# Patient Record
Sex: FEMALE | Race: WHITE | Employment: FULL TIME | URBAN - METROPOLITAN AREA
[De-identification: names, ages, dates, MRNs, and addresses within clinical notes are randomized per-mention and may not be internally consistent; named-entity substitution may affect disease eponyms.]

---

## 2021-04-09 NOTE — PROGRESS NOTES
SUBJECTIVE:   CC: Melba Galvan is an 30 year old woman who presents for preventive health visit.       Patient has been advised of split billing requirements and indicates understanding: Yes  Healthy Habits:     Getting at least 3 servings of Calcium per day:  Yes    Bi-annual eye exam:  Yes    Dental care twice a year:  NO    Sleep apnea or symptoms of sleep apnea:  None    Diet:  Low fat/cholesterol and Carbohydrate counting    Frequency of exercise:  2-3 days/week    Duration of exercise:  45-60 minutes    Taking medications regularly:  Yes    Medication side effects:  None    PHQ-2 Total Score: 0    Additional concerns today:  Yes  new patient to me & [facility]  She reports first physical since relocated to [facility] 9 month ago.  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        Pt states she has history of UTI's would like to discuss with provider treatments.  Previously treated in [facility] and [place]     Usually ovulation and preperiods burning in urine  But lately going often for urination but no burning, blood in urine     Also reports that 8 months ago-extensive  blood test, all normal - normal tsh, fasting lipid, fasting glucose - all normal. No avaliable records- [place].  Does not remember last pap smear and reports they been normal       Today's PHQ-2 Score:   PHQ-2 ( 1999 Pfizer) 4/9/2021   Q1: Little interest or pleasure in doing things 0   Q2: Feeling down, depressed or hopeless 0   PHQ-2 Score 0   Q1: Little interest or pleasure in doing things Not at all   Q2: Feeling down, depressed or hopeless Not at all   PHQ-2 Score 0       Abuse: Current or Past (Physical, Sexual or Emotional) - No  Do you feel safe in your environment?  Yes        Social History     Tobacco Use     Smoking status: Former Smoker     Packs/day: 0.50     Years: 10.00     Pack years: 5.00     Types: Hookah     Start date: 2010     Quit date: 2020     Years since quittin.6     Smokeless tobacco: Never Used   Substance Use Topics     Alcohol use: Never     If you drink alcohol do you typically have >3 drinks per day or >7 drinks per week? No    No flowsheet data found.    Reviewed orders with patient.  Reviewed health maintenance and updated orders accordingly - Yes  BP Readings from Last 3 Encounters:   21 113/73    Wt Readings from Last 3 Encounters:   21 64.4 kg (142 lb)                    Breast Cancer Screening:    Breast CA Risk Assessment (FHS-7) 2021   Do you have a family history of breast, colon, or ovarian cancer? No / Unknown       click delete button to remove this line now    Pertinent mammograms are reviewed under the imaging tab.    History of abnormal Pap smear: NO - age 30- 65 PAP every 3 years recommended     Reviewed and updated as needed this visit by clinical staff  Tobacco  Allergies  Meds  Problems  Med Hx  Surg Hx  Fam Hx  Soc Hx          Reviewed and updated as needed this visit by Provider  Tobacco  Allergies  Meds  Problems  Med Hx  Surg Hx  Fam Hx             Review of Systems  CONSTITUTIONAL: NEGATIVE for fever, chills, change in weight  INTEGUMENTARU/SKIN: NEGATIVE for worrisome rashes, moles or lesions  EYES: NEGATIVE for vision changes or irritation  ENT: NEGATIVE for ear, mouth and throat problems  RESP: NEGATIVE for significant cough or SOB  BREAST: NEGATIVE for masses, tenderness or discharge  CV: NEGATIVE for chest pain, palpitations or peripheral edema  GI: NEGATIVE for nausea, abdominal pain, heartburn, or change in bowel habits  : NEGATIVE for unusual urinary or vaginal symptoms. Periods are regular.  MUSCULOSKELETAL: NEGATIVE for significant arthralgias or myalgia  NEURO: NEGATIVE for  "weakness, dizziness or paresthesias  PSYCHIATRIC: NEGATIVE for changes in mood or affect     OBJECTIVE:   /73   Pulse 88   Temp 97.3  F (36.3  C) (Tympanic)   Resp 16   Ht 1.702 m (5' 7\")   Wt 64.4 kg (142 lb)   LMP 03/18/2021   SpO2 98%   BMI 22.24 kg/m    Physical Exam  GENERAL: healthy, alert and no distress  EYES: Eyes grossly normal to inspection, PERRL and conjunctivae and sclerae normal  HENT: ear canals and TM's normal, nose and mouth without ulcers or lesions  NECK: no adenopathy, no asymmetry, masses, or scars and thyroid normal to palpation  RESP: lungs clear to auscultation - no rales, rhonchi or wheezes  CV: regular rate and rhythm, normal S1 S2, no S3 or S4, no murmur, click or rub, no peripheral edema and peripheral pulses strong  ABDOMEN: soft, nontender, no hepatosplenomegaly, no masses and bowel sounds normal   (male): normal male genitalia without lesions or urethral discharge, no hernia  MS: no gross musculoskeletal defects noted, no edema  SKIN: no suspicious lesions or rashes  NEURO: Normal strength and tone, mentation intact and speech normal  PSYCH: mentation appears normal, affect normal/bright    Diagnostic Test Results:  Labs reviewed in Epic  No results found for this or any previous visit (from the past 24 hour(s)).    ASSESSMENT/PLAN:   Melba 30 year old new patient to [facility] and to me  was seen today for physical.    Diagnoses and all orders for this visit:    Routine general medical examination at a health care facility  Pap is sent- if NIL- ok to repeat in 3 yrs    Screening for cervical cancer  -     Pap imaged thin layer screen with HPV - recommended age 30 - 65 years (select HPV order below)  -     HPV High Risk Types DNA Cervical        Personal history of urinary tract infection  -     *UA reflex to Microscopic  Encouraged hydration  UA is pending & will inform as avaliable     History of vaginal infection  -     Wet prep  We discussed the positive results  Ok to " "wait and take diflucan- as she gets symptoms before periods and she is expecting them in next 3 day.  No need for long term treatment   Candidiasis of vulva and vagina  -    Single dose treatment sent    fluconazole (DIFLUCAN) 150 MG tablet; Take 1 tablet (150 mg) by mouth daily         Patient has been advised of split billing requirements and indicates understanding: Yes  COUNSELING:  Reviewed preventive health counseling, as reflected in patient instructions       Regular exercise       Healthy diet/nutrition       Vision screening       Hearing screening       Advance Care Planning    Estimated body mass index is 22.24 kg/m  as calculated from the following:    Height as of this encounter: 1.702 m (5' 7\").    Weight as of this encounter: 64.4 kg (142 lb).        She reports that she quit smoking about 7 months ago. Her smoking use included hookah. She started smoking about 10 years ago. She has a 5.00 pack-year smoking history. She has never used smokeless tobacco.      Counseling Resources:  ATP IV Guidelines  Pooled Cohorts Equation Calculator  Breast Cancer Risk Calculator  BRCA-Related Cancer Risk Assessment: FHS-7 Tool  FRAX Risk Assessment  ICSI Preventive Guidelines  Dietary Guidelines for Americans, 2010  [employer]'s MyPlate  ASA Prophylaxis  Lung CA Screening    Sonali Amaya MD  [facility] [place] [facility]  "

## 2021-04-12 ENCOUNTER — OFFICE VISIT (OUTPATIENT)
Dept: FAMILY MEDICINE | Facility: CLINIC | Age: 31
End: 2021-04-12
Payer: COMMERCIAL

## 2021-04-12 ENCOUNTER — MYC MEDICAL ADVICE (OUTPATIENT)
Dept: FAMILY MEDICINE | Facility: CLINIC | Age: 31
End: 2021-04-12

## 2021-04-12 VITALS
DIASTOLIC BLOOD PRESSURE: 73 MMHG | SYSTOLIC BLOOD PRESSURE: 113 MMHG | HEART RATE: 88 BPM | WEIGHT: 142 LBS | TEMPERATURE: 97.3 F | RESPIRATION RATE: 16 BRPM | OXYGEN SATURATION: 98 % | HEIGHT: 67 IN | BODY MASS INDEX: 22.29 KG/M2

## 2021-04-12 DIAGNOSIS — Z87.42 HISTORY OF VAGINAL INFECTION: ICD-10-CM

## 2021-04-12 DIAGNOSIS — Z87.440 PERSONAL HISTORY OF URINARY TRACT INFECTION: ICD-10-CM

## 2021-04-12 DIAGNOSIS — Z00.00 ROUTINE GENERAL MEDICAL EXAMINATION AT A HEALTH CARE FACILITY: Primary | ICD-10-CM

## 2021-04-12 DIAGNOSIS — Z12.4 SCREENING FOR CERVICAL CANCER: ICD-10-CM

## 2021-04-12 DIAGNOSIS — B37.31 CANDIDIASIS OF VULVA AND VAGINA: ICD-10-CM

## 2021-04-12 LAB
ALBUMIN UR-MCNC: NEGATIVE MG/DL
APPEARANCE UR: CLEAR
BILIRUB UR QL STRIP: NEGATIVE
COLOR UR AUTO: YELLOW
GLUCOSE UR STRIP-MCNC: NEGATIVE MG/DL
HGB UR QL STRIP: NEGATIVE
KETONES UR STRIP-MCNC: ABNORMAL MG/DL
LEUKOCYTE ESTERASE UR QL STRIP: NEGATIVE
NITRATE UR QL: NEGATIVE
PH UR STRIP: 6 PH (ref 5–7)
SOURCE: ABNORMAL
SP GR UR STRIP: 1.02 (ref 1–1.03)
UROBILINOGEN UR STRIP-ACNC: 0.2 EU/DL (ref 0.2–1)

## 2021-04-12 PROCEDURE — 81003 URINALYSIS AUTO W/O SCOPE: CPT | Performed by: FAMILY MEDICINE

## 2021-04-12 PROCEDURE — 87624 HPV HI-RISK TYP POOLED RSLT: CPT | Performed by: FAMILY MEDICINE

## 2021-04-12 PROCEDURE — 99385 PREV VISIT NEW AGE 18-39: CPT | Performed by: FAMILY MEDICINE

## 2021-04-12 PROCEDURE — G0145 SCR C/V CYTO,THINLAYER,RESCR: HCPCS | Performed by: FAMILY MEDICINE

## 2021-04-12 PROCEDURE — 87210 SMEAR WET MOUNT SALINE/INK: CPT | Performed by: FAMILY MEDICINE

## 2021-04-12 RX ORDER — FLUCONAZOLE 150 MG/1
150 TABLET ORAL DAILY
Qty: 1 TABLET | Refills: 0 | Status: SHIPPED | OUTPATIENT
Start: 2021-04-12 | End: 2021-04-28

## 2021-04-12 ASSESSMENT — MIFFLIN-ST. JEOR: SCORE: 1386.74

## 2021-04-13 ENCOUNTER — MYC MEDICAL ADVICE (OUTPATIENT)
Dept: LAB | Facility: CLINIC | Age: 31
End: 2021-04-13

## 2021-04-13 LAB
Lab: ABNORMAL
SPECIMEN SOURCE: ABNORMAL
WET PREP SPEC: ABNORMAL

## 2021-04-13 NOTE — RESULT ENCOUNTER NOTE
Dear lab- wet prep was reported verbally positive for BV and the results still in process.  Can you please review and release wet prep- thanks

## 2021-04-13 NOTE — TELEPHONE ENCOUNTER
A.S  Please see mychart message  You still recommend the fluconazole because there was a verbal confirmation of BV or are you waiting for lab results to finalize?    Thank you,  Geena Foote RN

## 2021-04-15 LAB
COPATH REPORT: NORMAL
PAP: NORMAL

## 2021-04-16 LAB
FINAL DIAGNOSIS: NORMAL
HPV HR 12 DNA CVX QL NAA+PROBE: NEGATIVE
HPV16 DNA SPEC QL NAA+PROBE: NEGATIVE
HPV18 DNA SPEC QL NAA+PROBE: NEGATIVE
SPECIMEN DESCRIPTION: NORMAL
SPECIMEN SOURCE CVX/VAG CYTO: NORMAL

## 2021-04-18 ENCOUNTER — MYC MEDICAL ADVICE (OUTPATIENT)
Dept: FAMILY MEDICINE | Facility: CLINIC | Age: 31
End: 2021-04-18

## 2021-04-19 ENCOUNTER — OFFICE VISIT (OUTPATIENT)
Dept: FAMILY MEDICINE | Facility: CLINIC | Age: 31
End: 2021-04-19
Payer: COMMERCIAL

## 2021-04-19 VITALS
WEIGHT: 140 LBS | BODY MASS INDEX: 21.97 KG/M2 | SYSTOLIC BLOOD PRESSURE: 102 MMHG | DIASTOLIC BLOOD PRESSURE: 70 MMHG | TEMPERATURE: 97.7 F | RESPIRATION RATE: 16 BRPM | HEIGHT: 67 IN | OXYGEN SATURATION: 99 % | HEART RATE: 86 BPM

## 2021-04-19 DIAGNOSIS — Z32.00 ENCOUNTER FOR CONFIRMATION OF PREGNANCY TEST RESULT WITH PHYSICAL EXAMINATION: Primary | ICD-10-CM

## 2021-04-19 LAB — HCG UR QL: POSITIVE

## 2021-04-19 PROCEDURE — 81025 URINE PREGNANCY TEST: CPT | Performed by: FAMILY MEDICINE

## 2021-04-19 PROCEDURE — 99214 OFFICE O/P EST MOD 30 MIN: CPT | Performed by: FAMILY MEDICINE

## 2021-04-19 ASSESSMENT — MIFFLIN-ST. JEOR: SCORE: 1377.67

## 2021-04-19 NOTE — PATIENT INSTRUCTIONS
Last menstural period   Expected date of delivery 21  4wk 4 d pregnant    Start over the counter prenatal vitamins , with folic acid- at least 400 mcg once daily  Over the counter omega 3 once daily       For morning sickness over the counter B 6 or over the counter unisom  Ok to take metoclopramide if you really need to    Call to schedule your imagin/17 or later to establish cardiac activity in fetus   [place] or [facility] [phone]

## 2021-04-19 NOTE — PROGRESS NOTES
Assessment & Plan     Encounter for confirmation of pregnancy test result with physical examination   at 4W & 4d pregnant  Urine pregnancy test is positive in clinic as well.Start prenatal vitamins   Omega 3 over the counter once daily  [facility] for first prenatal visit in next 4-6 weeks.  Based on last menstural period- of 3/18, Expected date of delivery is .    - HCG Qual, Urine - [facility],  Range, Prim  ([id number])  Prenatal care:   Instructed on use of triage nurse line and contacting the on-call provider after hours for an urgent need such as fever, vagina bleeding, bladder or vaginal infection, rupture of membranes,  or term labor.   Instructed on best evidence for: weight gain for her weight and height for pregnancy; healthy diet and foods to avoid; exercise and activity during pregnancy and maintenance of a generally healthy lifestyle.   Discussed the harms, benefits, side effects and alternative therapies for current prescribed and OTC medications.  Avoid caffeine intake, handling of toxic substances,  Encouraged  daily prenatal vitamins , child birth classes, choosing doctor for new born, and regular prenatal exams.    Discussed in detail collaborative care with midwives  Need to pick hospital and  provider.  30minutes spent on the date of the encounter doing chart review, history and exam, documentation and further activities per the note      Return in about 4 weeks (around 2021) for Prenatal care.    Sonali Amaya MD  [facility] is a 30 year old who presents for the following health issues   HPI     Patient is here for pregnancy confirmation has taken 1 test at home which was positive .   Last menstrual cycle was on 2021 .   Has metoclopramide and in past taken for nausea for food eversion/ or nausea induced by strong smells    She & her  are very excited.  She did not expect it to be so quick and is happy  "with the outcme      Review of Systems   Constitutional, HEENT, cardiovascular, pulmonary, GI, , musculoskeletal, neuro, skin, endocrine and psych systems are negative, except as otherwise noted.      Objective    /70   Pulse 86   Temp 97.7  F (36.5  C) (Skin)   Resp 16   Ht 1.702 m (5' 7\")   Wt 63.5 kg (140 lb)   LMP 03/18/2021   SpO2 99%   BMI 21.93 kg/m    Body mass index is 21.93 kg/m .  Physical Exam   GENERAL: healthy, alert and no distress  NECK: no adenopathy, no asymmetry, masses, or scars and thyroid normal to palpation  RESP: lungs clear to auscultation - no rales, rhonchi or wheezes  CV: regular rate and rhythm, normal S1 S2, no S3 or S4, no murmur, click or rub, no peripheral edema and peripheral pulses strong  ABDOMEN: soft, nontender, no hepatosplenomegaly, no masses and bowel sounds normal    Results for orders placed or performed in visit on 04/19/21 (from the past 24 hour(s))   HCG Qual, Urine - [facility]  ([id number])   Result Value Ref Range    HCG Qual Urine Positive (A) NEG^Negative               "

## 2021-04-28 ENCOUNTER — PRENATAL OFFICE VISIT (OUTPATIENT)
Dept: FAMILY MEDICINE | Facility: CLINIC | Age: 31
End: 2021-04-28
Payer: COMMERCIAL

## 2021-04-28 ENCOUNTER — NURSE TRIAGE (OUTPATIENT)
Dept: NURSING | Facility: CLINIC | Age: 31
End: 2021-04-28

## 2021-04-28 VITALS
HEART RATE: 88 BPM | RESPIRATION RATE: 16 BRPM | BODY MASS INDEX: 22.37 KG/M2 | TEMPERATURE: 98.6 F | WEIGHT: 142.56 LBS | HEIGHT: 67 IN | SYSTOLIC BLOOD PRESSURE: 106 MMHG | DIASTOLIC BLOOD PRESSURE: 70 MMHG | OXYGEN SATURATION: 96 %

## 2021-04-28 DIAGNOSIS — O26.851 SPOTTING IN FIRST TRIMESTER: Primary | ICD-10-CM

## 2021-04-28 LAB
ABO + RH BLD: NORMAL
ABO + RH BLD: NORMAL
B-HCG SERPL-ACNC: ABNORMAL IU/L (ref 0–5)
ERYTHROCYTE [DISTWIDTH] IN BLOOD BY AUTOMATED COUNT: 13 % (ref 10–15)
HCT VFR BLD AUTO: 38 % (ref 35–47)
HGB BLD-MCNC: 12.8 G/DL (ref 11.7–15.7)
MCH RBC QN AUTO: 29 PG (ref 26.5–33)
MCHC RBC AUTO-ENTMCNC: 33.7 G/DL (ref 31.5–36.5)
MCV RBC AUTO: 86 FL (ref 78–100)
PLATELET # BLD AUTO: 189 10E9/L (ref 150–450)
RBC # BLD AUTO: 4.42 10E12/L (ref 3.8–5.2)
SPECIMEN EXP DATE BLD: NORMAL
WBC # BLD AUTO: 6.6 10E9/L (ref 4–11)

## 2021-04-28 PROCEDURE — 85027 COMPLETE CBC AUTOMATED: CPT | Performed by: FAMILY MEDICINE

## 2021-04-28 PROCEDURE — 86900 BLOOD TYPING SEROLOGIC ABO: CPT | Performed by: FAMILY MEDICINE

## 2021-04-28 PROCEDURE — 99214 OFFICE O/P EST MOD 30 MIN: CPT | Performed by: FAMILY MEDICINE

## 2021-04-28 PROCEDURE — 84702 CHORIONIC GONADOTROPIN TEST: CPT | Performed by: FAMILY MEDICINE

## 2021-04-28 PROCEDURE — 36415 COLL VENOUS BLD VENIPUNCTURE: CPT | Performed by: FAMILY MEDICINE

## 2021-04-28 PROCEDURE — 86901 BLOOD TYPING SEROLOGIC RH(D): CPT | Performed by: FAMILY MEDICINE

## 2021-04-28 ASSESSMENT — MIFFLIN-ST. JEOR: SCORE: 1389.29

## 2021-04-28 NOTE — PROGRESS NOTES
"    Assessment & Plan     Spotting in first trimester  Melbaranda Galvan is 31 yo Primigravida   Based on lmp she is 5w6 today  and has been cramping with some bleed today.   Complete pelvic rest  ULTRASOUND in 1-2 days   - US OB < 14 Weeks Single; Future  - HCG Quantitative Pregnancy, Blood ([id number])  - ABO and Rh  - CBC with platelets    Referral given for OBG/GYN    FMG: [facility] [phone]   [email or web address]  UMP: [facility] [phone]      Return in about 1 week (around 5/5/2021) for concerns,unresolved.    Sonali Amaya MD  [facility]    Stephenie Reilly is a 30 year old who presents for the following health issues     HPI     Patient states she has been having vaginal bleeding \"tiny clots\"  2 hrs ago today when she woke up  She  noted some cramping  Last night. & that is getting better         Review of Systems   Constitutional, HEENT, cardiovascular, pulmonary, GI, , musculoskeletal, neuro, skin, endocrine and psych systems are negative, except as otherwise noted.      Objective    /70   Pulse 88   Temp 98.6  F (37  C) (Tympanic)   Resp 16   Ht 1.702 m (5' 7\")   Wt 64.7 kg (142 lb 9 oz)   LMP 03/18/2021   SpO2 96%   BMI 22.33 kg/m    Body mass index is 22.33 kg/m .  Physical Exam   GENERAL: healthy, alert and no distress  NECK: no adenopathy, no asymmetry, masses, or scars and thyroid normal to palpation  RESP: lungs clear to auscultation - no rales, rhonchi or wheezes  CV: regular rate and rhythm, normal S1 S2, no S3 or S4, no murmur, click or rub, no peripheral edema and peripheral pulses strong  ABDOMEN: soft, nontender, no hepatosplenomegaly, no masses and bowel sounds normal   (female): normal female external genitalia, normal urethral meatus, vaginal mucosa, normal cervix/adnexa/uterus without masses or discharge  MS: no gross musculoskeletal defects noted, no " edema    No results found for this or any previous visit (from the past 24 hour(s)).

## 2021-04-28 NOTE — TELEPHONE ENCOUNTER
Melba is having spotting vaginally this morning.  Tiny clots present.  Melba had a confirmation of pregnancy through urine with MD Amaya on April 19th.   Melba uses a small pad and is the size of 10 cent coin.  Patient is requesting to be transferred back to [facility] to see if she can be seen in clinic today.    COVID 19 Nurse Triage Plan/Patient Instructions    Please be aware that novel coronavirus (COVID-19) may be circulating in the community. If you develop symptoms such as fever, cough, or SOB or if you have concerns about the presence of another infection including coronavirus (COVID-19), please contact your health care provider or visit [email or web address].     Disposition/Instructions    Home care recommended. Follow home care protocol based instructions.    Thank you for taking steps to prevent the spread of this virus.  o Limit your contact with others.  o Wear a simple mask to cover your cough.  o Wash your hands well and often.    Resources    [facility]: About COVID-19: [email or web address]    CDC: What to Do If You're Sick: www.cdc.gov/coronavirus/2019-ncov/about/steps-when-sick.html    CDC: Ending Home Isolation: www.cdc.gov/coronavirus/2019-ncov/hcp/disposition-in-home-patients.html     CDC: Caring for Someone: www.cdc.gov/coronavirus/2019-ncov/if-you-are-sick/care-for-someone.html     [facility]: Interim Guidance for Hospital Discharge to Home: www.health.[facility]/diseases/coronavirus/hcp/hospdischarge.pdf    [facility] clinical trials (COVID-19 research studies): [email or web address].[facility]/umn-clinical-trials     Below are the COVID-19 hotlines at the [place] Department of Health ([facility]). Interpreters are available.   o For health questions: Call 210-105-3050 or 1-352.118.1271 (7 a.m. to 7 p.m.)  o For questions about schools and childcare: Call 840-582-0165 or 1-848.443.8206 (7 a.m. to 7 p.m.)                       Additional Information    Negative: Shock  suspected (e.g., cold/pale/clammy skin, too weak to stand, low BP, rapid pulse)    Negative: Difficult to awaken or acting confused (e.g., disoriented, slurred speech)    Negative: Passed out (i.e., fainted, collapsed and was not responding)    Negative: Sounds like a life-threatening emergency to the triager    Negative: SEVERE abdominal pain (e.g., excruciating)    Negative: SEVERE vaginal bleeding (i.e., soaking 2 pads / hour, large blood clots) and present for 2 or more hours    Negative: SEVERE dizziness (e.g., unable to stand, requires support to walk, feels like passing out)    Negative: MODERATE vaginal bleeding (i.e., soaking 1 pad) and present > 6 hours    Negative: MODERATE vaginal bleeding (i.e., soaking 1 pad / hour, clots) and pregnant > 12 weeks    Negative: Passed tissue (e.g., gray-white)    Negative: Shoulder pain    Negative: Constant abdominal pain lasting > 1 hour    Negative: Fever > 100.4 F (38.0 C)    Negative: Pale skin (pallor) of new onset or worsening    Negative: Patient sounds very sick or weak to the triager    Negative: MODERATE vaginal bleeding (i.e., soaking 1 pad / hour; clots)    Negative: Intermittent lower abdominal pain (e.g., cramping) lasting > 24 hours    Negative: Pain or burning with passing urine (urination)    Negative: Prior history of 'ectopic pregnancy' or previous tubal surgery (e.g., tubal ligation)    Negative: Patient wants to be seen    Negative: MILD vaginal bleeding (i.e., less than 1 pad / hour; less than patient's usual menstrual bleeding; not just spotting)    Negative: SPOTTING lasting > 48 hours or spotting happens more than once in a week    Negative: Has IUD    Negative: Unusual vaginal discharge (e.g., bad smelling, yellow, green, or foamy-white)    Negative: Not feeling pregnant any longer (e.g., breast tenderness or nausea has disappeared)    SPOTTING (single or brief episode)    Protocols used: PREGNANCY - VAGINAL BLEEDING LESS THAN 20 WEEKS  ODALIS-DORINDA-OH

## 2021-04-28 NOTE — PATIENT INSTRUCTIONS
Complete pelvic rest  Call to schedule your imaging: for ultrasound   [place] or [facility] [phone]    Very common to have first trimester spotting  Will keep you posted abut ultrasound     [facility]G: [facility] [phone]   [email or web address]  UMP: [facility] [phone]

## 2021-04-29 ENCOUNTER — TELEPHONE (OUTPATIENT)
Dept: FAMILY MEDICINE | Facility: CLINIC | Age: 31
End: 2021-04-29

## 2021-04-29 ENCOUNTER — MYC MEDICAL ADVICE (OUTPATIENT)
Dept: FAMILY MEDICINE | Facility: CLINIC | Age: 31
End: 2021-04-29

## 2021-04-29 ENCOUNTER — HOSPITAL ENCOUNTER (OUTPATIENT)
Dept: ULTRASOUND IMAGING | Facility: CLINIC | Age: 31
Discharge: HOME | End: 2021-04-29
Attending: FAMILY MEDICINE | Admitting: FAMILY MEDICINE
Payer: COMMERCIAL

## 2021-04-29 DIAGNOSIS — O26.851 SPOTTING IN FIRST TRIMESTER: ICD-10-CM

## 2021-04-29 PROCEDURE — 76801 OB US < 14 WKS SINGLE FETUS: CPT

## 2021-04-29 PROCEDURE — 76817 TRANSVAGINAL US OBSTETRIC: CPT | Mod: 26 | Performed by: RADIOLOGY

## 2021-04-29 PROCEDURE — 76801 OB US < 14 WKS SINGLE FETUS: CPT | Mod: 26 | Performed by: RADIOLOGY

## 2021-04-29 NOTE — TELEPHONE ENCOUNTER
Reason for Call:  Other appointment    Detailed comments: Patient sent over a my chart: I did my pregnancy confirmation appointment, then the doctor told me to schedule the first prenatal appointment on the 17th of May, but I want to see what appointments I need to book.  Can someone help her? Thank you      Phone Number Patient can be reached at: Home number on file [phone] (home)    Best Time: anytime    Can we leave a detailed message on this number? YES    Call taken on 4/29/2021 at 3:05 PM by Gloria Ho

## 2021-04-29 NOTE — RESULT ENCOUNTER NOTE
Ultrasound shows gestational sac without identifiable embryo.  A repeat ultrasound in 2 weeks is recommended  It could be early pregnancy-repeat ultrasound in 2 weeks- its futured.    Tomorrow- lab only appointment - for beta HCG

## 2021-04-30 ENCOUNTER — TELEPHONE (OUTPATIENT)
Dept: FAMILY MEDICINE | Facility: CLINIC | Age: 31
End: 2021-04-30

## 2021-04-30 DIAGNOSIS — O20.9 FIRST TRIMESTER BLEEDING: Primary | ICD-10-CM

## 2021-04-30 NOTE — TELEPHONE ENCOUNTER
Pt arrived to lab for hcg planned by dr. Courtney.  Orders placed for this and serum progesterone for evaluation of possible miscarriage (dr. courtney out of office).     Ultrasound yesterday excluded ectopic pregnancy.      Joel Wegener,MD

## 2021-05-04 ENCOUNTER — MYC MEDICAL ADVICE (OUTPATIENT)
Dept: FAMILY MEDICINE | Facility: CLINIC | Age: 31
End: 2021-05-04

## 2021-05-04 DIAGNOSIS — O20.9 FIRST TRIMESTER BLEEDING: Primary | ICD-10-CM

## 2021-05-04 NOTE — TELEPHONE ENCOUNTER
Dear lab- please We will make sure - she is credited for the test that were lost & not charged .      BRIDGETTEI- Dear Triage,   I spoke with patient and she was very understanding   Reports no vaginal bleeding. No cramping. Some nausea .and doing well.    Recheck of labs is an option- labs futured.  Since she is not having further vaginal bleeding- OK to monitor instead of repeat labs.  Ultrasound is encouarged to be completed next week and that will give further information.    She is establishing OB care at [facility].    She appreciated the call and had no further concerns

## 2021-05-04 NOTE — TELEPHONE ENCOUNTER
Received call from lab.  Serum HCG sample sent by  Friday but not received by lab unfortunately.     I will let dr. courtney know to follow up with Ms. Galvan.     Joel Wegener,MD

## 2021-05-14 ENCOUNTER — HOSPITAL ENCOUNTER (OUTPATIENT)
Dept: ULTRASOUND IMAGING | Facility: CLINIC | Age: 31
Discharge: HOME | End: 2021-05-14
Attending: OBSTETRICS & GYNECOLOGY | Admitting: OBSTETRICS & GYNECOLOGY
Payer: COMMERCIAL

## 2021-05-14 DIAGNOSIS — Z34.91 ENCOUNTER FOR SUPERVISION OF NORMAL PREGNANCY IN FIRST TRIMESTER, UNSPECIFIED GRAVIDITY: ICD-10-CM

## 2021-05-14 PROCEDURE — 76801 OB US < 14 WKS SINGLE FETUS: CPT

## 2021-05-27 ENCOUNTER — PRENATAL OFFICE VISIT (OUTPATIENT)
Dept: NURSING | Facility: CLINIC | Age: 31
End: 2021-05-27
Payer: COMMERCIAL

## 2021-05-27 ENCOUNTER — MYC MEDICAL ADVICE (OUTPATIENT)
Dept: OBGYN | Facility: CLINIC | Age: 31
End: 2021-05-27

## 2021-05-27 VITALS — WEIGHT: 142 LBS | HEIGHT: 67 IN | BODY MASS INDEX: 22.29 KG/M2

## 2021-05-27 DIAGNOSIS — Z34.01 ENCOUNTER FOR SUPERVISION OF NORMAL FIRST PREGNANCY IN FIRST TRIMESTER: Primary | ICD-10-CM

## 2021-05-27 DIAGNOSIS — O36.80X0 PREGNANCY WITH INCONCLUSIVE FETAL VIABILITY: ICD-10-CM

## 2021-05-27 DIAGNOSIS — Z13.79 GENETIC SCREENING: Primary | ICD-10-CM

## 2021-05-27 PROBLEM — Z34.00 SUPERVISION OF NORMAL IUP (INTRAUTERINE PREGNANCY) IN PRIMIGRAVIDA: Status: ACTIVE | Noted: 2021-05-27

## 2021-05-27 PROCEDURE — 99207 PR NO CHARGE NURSE ONLY: CPT

## 2021-05-27 ASSESSMENT — MIFFLIN-ST. JEOR: SCORE: 1386.74

## 2021-05-27 NOTE — PROGRESS NOTES
SUBJECTIVE:     HPI:    This is a 30 year old female patient,  who presents for her first obstetrical visit.    JOHNNIE: 2021, by Last Menstrual Period.  She is 10w0d weeks.  Her cycles are regular.  Her last menstrual period was normal.   Since her LMP, she has experienced  nausea and fatigue.    Additional History: first pregnancy-healthy, pap/annual UTD    Have you travelled during the pregnancy?No  Have your sexual partner(s) travelled during the pregnancy?No    HISTORY:   Planned Pregnancy: Yes  Marital Status:   Occupation: Pharmacist  Living in Household: Spouse    Past History:  Her past medical history History reviewed. No pertinent past medical history..      She has a history of  first pregnancy    Since her last LMP she denies use of alcohol, tobacco and street drugs.    Past medical, surgical, social and family history were reviewed and updated in [facility].    Current Outpatient Medications   Medication     Prenatal w/o A Vit-Fe Fum-FA (PRENATA PO)     No current facility-administered medications for this visit.        ROS:   12 point review of systems negative other than symptoms noted below or in the HPI.  Constitutional: Fatigue  Gastrointestinal: Nausea    Nurse phone visit completed. Prenatal book and folder containing standard educational hand-outs and brochures will be given at the next visit to patient. Information in folder reviewed over the phone and sent via [employer]. Questions answered. Pt unsure about NIPT. Pt advised to call the clinic if she has any questions or concerns related to her pregnancy. Prenatal labs future ordered.   Adilia Crawley RN on 2021 at 3:54 PM        Lab Results   Component Value Date    PAP NIL 2021     PHQ-9 score:    PHQ 2021   PHQ-9 Total Score 2   Q9: Thoughts of better off dead/self-harm past 2 weeks Not at all               BAL-7 SCORE 2021   Total Score 6 (mild anxiety)   Total Score 6             Patient supplied answers from  "flow sheet for:  Prenatal OB Questionnaire.                                OBJECTIVE:     EXAM:  Ht 1.702 m (5' 7\")   Wt 64.4 kg (142 lb)   LMP 03/18/2021   BMI 22.24 kg/m   Body mass index is 22.24 kg/m .      "

## 2021-06-01 ENCOUNTER — PRENATAL OFFICE VISIT (OUTPATIENT)
Dept: MIDWIFE SERVICES | Facility: CLINIC | Age: 31
End: 2021-06-01
Payer: COMMERCIAL

## 2021-06-01 VITALS
WEIGHT: 145.4 LBS | BODY MASS INDEX: 22.82 KG/M2 | DIASTOLIC BLOOD PRESSURE: 50 MMHG | SYSTOLIC BLOOD PRESSURE: 92 MMHG | HEIGHT: 67 IN

## 2021-06-01 DIAGNOSIS — Z34.01 ENCOUNTER FOR SUPERVISION OF NORMAL FIRST PREGNANCY IN FIRST TRIMESTER: ICD-10-CM

## 2021-06-01 DIAGNOSIS — Z13.79 GENETIC SCREENING: ICD-10-CM

## 2021-06-01 LAB
ALBUMIN UR-MCNC: NEGATIVE MG/DL
APPEARANCE UR: CLEAR
BILIRUB UR QL STRIP: NEGATIVE
COLOR UR AUTO: YELLOW
GLUCOSE UR STRIP-MCNC: NEGATIVE MG/DL
HGB UR QL STRIP: NEGATIVE
KETONES UR STRIP-MCNC: NEGATIVE MG/DL
LEUKOCYTE ESTERASE UR QL STRIP: NEGATIVE
NITRATE UR QL: NEGATIVE
PH UR STRIP: 7 PH (ref 5–7)
SOURCE: NORMAL
SP GR UR STRIP: 1.02 (ref 1–1.03)
UROBILINOGEN UR STRIP-ACNC: 0.2 EU/DL (ref 0.2–1)

## 2021-06-01 PROCEDURE — 86762 RUBELLA ANTIBODY: CPT | Performed by: ADVANCED PRACTICE MIDWIFE

## 2021-06-01 PROCEDURE — 99207 PR FIRST OB VISIT: CPT | Performed by: ADVANCED PRACTICE MIDWIFE

## 2021-06-01 PROCEDURE — 86780 TREPONEMA PALLIDUM: CPT | Mod: 90 | Performed by: ADVANCED PRACTICE MIDWIFE

## 2021-06-01 PROCEDURE — 87086 URINE CULTURE/COLONY COUNT: CPT | Performed by: ADVANCED PRACTICE MIDWIFE

## 2021-06-01 PROCEDURE — 36415 COLL VENOUS BLD VENIPUNCTURE: CPT | Performed by: ADVANCED PRACTICE MIDWIFE

## 2021-06-01 PROCEDURE — 99N1100 PR STATISTIC VERIFI PRENATAL TRISOMY 21,18,13: Mod: 90 | Performed by: ADVANCED PRACTICE MIDWIFE

## 2021-06-01 PROCEDURE — 81003 URINALYSIS AUTO W/O SCOPE: CPT | Performed by: ADVANCED PRACTICE MIDWIFE

## 2021-06-01 PROCEDURE — 87389 HIV-1 AG W/HIV-1&-2 AB AG IA: CPT | Performed by: ADVANCED PRACTICE MIDWIFE

## 2021-06-01 PROCEDURE — 99000 SPECIMEN HANDLING OFFICE-LAB: CPT | Performed by: ADVANCED PRACTICE MIDWIFE

## 2021-06-01 PROCEDURE — 87340 HEPATITIS B SURFACE AG IA: CPT | Performed by: ADVANCED PRACTICE MIDWIFE

## 2021-06-01 ASSESSMENT — MIFFLIN-ST. JEOR: SCORE: 1402.16

## 2021-06-01 NOTE — PATIENT INSTRUCTIONS
Thank you for coming to see the Midwives at the   [facility]!      Please take prenatal vitamin with DHA and vitamin D3 2,000-3,000 international unit(s) once daily during the entire pregnancy.      We will notify you about your labs that were drawn today once we get the results back.  If you have MyChart your lab results will be posted there.      Someone from the clinic will call you personally or send you a [employer]otera message with your results.      If you need any refills of medications please call your pharmacy and they will contact us.      If you have a medical emergency please call 911.      If you have any concerns about today's visit, wish to schedule another appointment, or have an urgent medical concern please call our office at [phone]. You can also make appointments through [employer].      After hours you may also call the clinic number above to be connected with [facility]'s after hours triage nurse.  The nurse can page the midwife on call if needed. There is always a midwife on call 24 hours a day.    Prenatal Care Recommendations:    Before 14 weeks: Dating ultrasound, genetic testing       This ultrasound helps us determine your dates accurately. Innatal (genetic screening test) can be drawn anytime after 10 weeks of gestation.    16 weeks: Optional genetic testing single AFP       This testing helps understand your baby's risk for some genetic abnormalities.    18-22 weeks:  Screening anatomy ultrasound       This testing will look for early growth abnormalities, placenta location, and may tell the baby's gender if you wish to find out.    24-27 weeks: One hour diabetes test (GCT) and complete blood count       This test helps identify diabetes of pregnancy or gestational diabetes.  We also look   at the iron in your blood and how well your blood clots.    28 - 36 weeks: Tetanus shot (Tdap)       This shot helps protect you and your baby from whooping cough.    36 weeks and  later: Group B Strep test (GBS)       This test helps predict if you need antibiotics in labor to prevent infection for your baby.    Anytime September to April:  Flu shot       This shot helps protect you and your family from the flu.  This is especially important during pregnancy.        The typical schedule after your first visit today you can expect:     Visit 2 - 12-16 weeks  Visit 3 - 20 weeks  Visit 4 - 24 weeks  Visit 5 - 28 weeks  Visit 6 - 30 weeks  Visit 7 - 32 weeks  Visit 8 - 34 weeks  Visit 9 - 36 weeks  Weekly after 36 weeks until delivery.        Any time during or after your pregnancy you may experience increased depression and/or mood changes.    We are here to support you. Please contact us if you are:    Feeling anxious    Overwhelmed or sad     Trouble sleeping    Crying uncontrollably    Trouble caring for yourself or baby.    Any thoughts of hurting yourself, your baby, or anyone else    If anything comes up between your visits or you have concerns please don't hesitate to contact us.    Secure access to your medical record:  Use [employer] (secure email communication and access to your chart) to send your primary care provider a message or make an appointment. Ask someone on your Team how to sign up for [employer]. To log on to Offerial or for more information in [employer] please visit the website at [email or web address].       Certified Nurse Midwife (CNM) Team    LUCIANA Kent, LUCIANA CROOKS, LUCIANA, [facility]-BC  Yazan Montoya DNP, APRN, LUCIANA Cuenca DNP, APRN, LUCIANA Triplettjokenna NASH, APRN, CNSEBASTIEN CROOKS, LUCIANA (in clinic Mondays only)      Again, thank you for choosing the midwives at [facility].  We are excited to be a part of your pregnancy. Please let us know how we can best partner with you to improve your and your family's health.    Over-the-counter (OTC) medications during pregnancy    Make sure to follow  package directions for dosing and information unless otherwise noted on the list.    Morning sickness/nausea:      Unisom (doxylamine) 12.5 mg (1/2 tab) and Vitamin B6 25-50 mg three times daily. Unisom may cause some drowsiness as it is typically used for sleep. The combination of these medications can be very effective but they can also be taken separately    Dramamine (Dimenhydrinate) 25-50 mg every 4-6 hours as needed    Benadryl (diphenhydramine) 25-50 mg every 4-6 hours     Ginger tablets 1000 mg per day in divided doses    Constipation:      Colace (Docusate sodium)    Metamucil    Citrucel    Milk of magnesia    Fibercon    Miralax (if all other methods have failed)    Diarrhea:    Imodium (loperamide)    Heartburn:    Pepcid (famotidine)    Prilosec (omeprazole)    Antacids-Tums, Maalox (liquid or tablets), Rolaids  Pepto Bismol and [place] are NOT RECOMMENDED for use during pregnancy because they contain aspirin    Hemorrhoids:      Tucks pads/ointment    Anusol/Anusol-HC    Preparation H    Gas Pain  Simethicone (Gas-X, Mylanta Gas, Mylicon)      Cough, cold, and congestion:      Robitussin (dextromethorphan) and Robitussin DM (dextromethorphan and guaifenesin)    Cough drops/zinc lozenges    Mucinex    Sudafed (after 16 weeks gestation)    Vicks Vapo rub  Cough medicine with alcohol like Nyquil is not recommended during pregnancy    Headache:      Acetaminophen (Tylenol) 650 mg every 4-6 hours or 1000 mg every 6 hours, do not exceed 4000 mg in 24 hours   Ibuprofen (Advil/Motrin) and Naproxen (Aleve) are not recommended during the 1st or 3rd trimester of pregnancy    Allergy:      Benadryl (diphenhydramine)    Zyrtec (cetirizine)    Claritin (loratadine)    Rash/Itching:      Benadryl lotion    Cortaid cream (Hydrocortisone cream)    Benadryl (diphenhydramine)    Vaginal yeast infection:      Monistat 3 or 7 day    Gyne-Lotrimin    Acne:      Benzoyl Peroxide    Salicylic acid     If you have  questions or concerns about any medications that are available OTC or you are unsure if something is safe call:    [facility]  [phone]

## 2021-06-01 NOTE — PROGRESS NOTES
HPI:     This is a 30 year old female patient,  who presents for her first obstetrical visit with her  [place]. 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 They are not driving yet so getting lots of exercise walking for groceries etc. Had US 2 weeks ago, going to [place] for 3 weeks on Friday. Overall feeling well, some issues with appetite, not a lot of taste but trying to eat balanced diet.    Questions about botox, laser hair removal and varicosities.     JOHNNIE: 2021, by Last Menstrual Period.  She is 10w0d weeks.  Her cycles are regular.  Her last menstrual period was normal.   Since her LMP, she has experienced  nausea and fatigue.     Additional History: first pregnancy-healthy, pap/annual UTD    Have you travelled during the pregnancy?No  Have your sexual partner(s) travelled during the pregnancy?No     HISTORY:   Planned Pregnancy: Yes  Marital Status:   Occupation: Pharmacist  Living in Household: Spouse     Past History:  Her past medical history   Past Medical History   History reviewed. No pertinent past medical history.   .       She has a history of  first pregnancy     Since her last LMP she denies use of alcohol, tobacco and street drugs.     Past medical, surgical, social and family history were reviewed and updated in [facility].         Current Outpatient Medications   Medication     Prenatal w/o A Vit-Fe Fum-FA (PRENATA PO)      No current facility-administered medications for this visit.          ROS:   12 point review of systems negative other than symptoms noted below or in the HPI.  Constitutional: Fatigue  Gastrointestinal: Nausea     Nurse phone visit completed. Prenatal book and folder containing standard educational hand-outs and brochures will be given at the next visit to patient. Information in folder reviewed over the phone and sent via [employer]. Questions answered. Pt unsure about NIPT. Pt advised to call the clinic if she has any questions or concerns related  "to her pregnancy. Prenatal labs future ordered.   Adilia Crawley RN on 2021 at 3:54 PM        OBJECTIVE:     EXAM:  BP 92/50   Ht 1.702 m (5' 7\")   Wt 66 kg (145 lb 6.4 oz)   LMP 2021   BMI 22.77 kg/m   Body mass index is 22.77 kg/m .    GENERAL: healthy, alert and no distress  NECK: no adenopathy, no asymmetry, masses, or scars and thyroid normal to palpation  RESP: lungs clear to auscultation - no rales, rhonchi or wheezes  CV: regular rate and rhythm, ABDOMEN: soft, nontender, no hepatosplenomegaly, no masses and bowel sounds normal  MS: no gross musculoskeletal defects noted, no edema  SKIN: no suspicious lesions or rashes  NEURO: Normal strength and tone, mentation intact and speech normal  PSYCH: mentation appears normal, affect normal/bright   per doppler    ASSESSMENT/PLAN:       ICD-10-CM    1. Encounter for supervision of normal first pregnancy in first trimester  Z34.01 *UA reflex to Microscopic     Urine Culture Aerobic Bacterial     Treponema Abs w Reflex to RPR and Titer     Rubella Antibody IgG Quantitative     HIV Antigen Antibody Combo     Hepatitis B surface antigen     Send outs misc test   2. Genetic screening  Z13.79 Non Invasive Prenatal Test Cell Free DNA     Preparent Select: Laboratory Miscellaneous Order       30 year old , On 2021 patient is 10w5d weeks of pregnancy with JOHNNIE of 2021, by Last Menstrual Period     consult for US for AMA patients: NA  Genetic Testing reviewed and discussed, patient desires innatal and preparent screening. Handout provided, consent reviewed and signed. Discussed testing and implications, if positive knows she will need referral and/or partner testing    *Not a candidate for baby ASA    COUNSELING    Instructed on use of triage nurse line and contacting the on call CNM after hours in an emergency.     Symptoms of N&V and fatigue usually start to resolve around 12-16 weeks/appetite hopefully will also resume around " this time as well    Reviewed CNM philosophy, call schedule for labor and delivery, and FSH for delivery    1st OB handout given outlining appointment spacing and CNM information    Reviewed exercise and nutrition    Recommend to gain 25-35 pounds with her pregnancy.    Discussed OTC medications. OB med list given    Do not recommend botox or laser hair removal while pregnant, may use compression stockings to help prevent varicosities given family history     Encouraged patient to take PNV's/DHA    Travel precautions discussed, no air travel after 36 weeks recommendations discussed    Consider daily probiotics to help prevent yeast, consider monistat 5-7 day as needed with infection, reviewed no implication of yeast for baby only BV increases risk of PTL    Will call patient with lab results when available     F/U US scheduled on Thursday    Will return to the clinic in 4 weeks for her next routine prenatal check.  Will call to be seen sooner if problems arise.      DAKSHA Herman CNM

## 2021-06-02 LAB
HBV SURFACE AG SERPL QL IA: NONREACTIVE
HIV 1+2 AB+HIV1 P24 AG SERPL QL IA: NONREACTIVE
MISCELLANEOUS TEST: NORMAL
T PALLIDUM AB SER QL: NONREACTIVE

## 2021-06-03 LAB
BACTERIA SPEC CULT: NO GROWTH
Lab: NORMAL
RUBV IGG SERPL IA-ACNC: 20 IU/ML
SPECIMEN SOURCE: NORMAL

## 2021-06-07 ENCOUNTER — TELEPHONE (OUTPATIENT)
Dept: OBGYN | Facility: CLINIC | Age: 31
End: 2021-06-07

## 2021-06-07 LAB
LAB SCANNED RESULT: NORMAL
LAB SCANNED RESULT: NORMAL

## 2021-07-16 ENCOUNTER — TELEPHONE (OUTPATIENT)
Dept: OBGYN | Facility: CLINIC | Age: 31
End: 2021-07-16

## 2021-07-16 NOTE — TELEPHONE ENCOUNTER
Gender placed in envelope and placed behind desk for pt pick-up per pt request.    Марина Fermin RN on 7/16/2021 at 10:06 AM

## 2021-07-16 NOTE — TELEPHONE ENCOUNTER
Patient would like her gender reveal sealed in an envelope for her to  when she comes in next Tuesday.  Please and thank you

## 2021-07-20 ENCOUNTER — PRENATAL OFFICE VISIT (OUTPATIENT)
Dept: OBGYN | Facility: CLINIC | Age: 31
End: 2021-07-20
Payer: COMMERCIAL

## 2021-07-20 ENCOUNTER — TELEPHONE (OUTPATIENT)
Dept: OBGYN | Facility: CLINIC | Age: 31
End: 2021-07-20

## 2021-07-20 VITALS — DIASTOLIC BLOOD PRESSURE: 58 MMHG | WEIGHT: 147 LBS | SYSTOLIC BLOOD PRESSURE: 96 MMHG | BODY MASS INDEX: 23.02 KG/M2

## 2021-07-20 DIAGNOSIS — Z36.1 ENCOUNTER FOR ANTENATAL SCREENING FOR HIGH ALPHA-FETOPROTEIN LEVEL: ICD-10-CM

## 2021-07-20 DIAGNOSIS — Z34.02 ENCOUNTER FOR SUPERVISION OF NORMAL FIRST PREGNANCY IN SECOND TRIMESTER: Primary | ICD-10-CM

## 2021-07-20 PROCEDURE — 99207 PR PRENATAL VISIT: CPT | Performed by: OBSTETRICS & GYNECOLOGY

## 2021-07-20 PROCEDURE — 36415 COLL VENOUS BLD VENIPUNCTURE: CPT | Performed by: OBSTETRICS & GYNECOLOGY

## 2021-07-20 PROCEDURE — 82105 ALPHA-FETOPROTEIN SERUM: CPT | Mod: 90 | Performed by: OBSTETRICS & GYNECOLOGY

## 2021-07-20 PROCEDURE — 99000 SPECIMEN HANDLING OFFICE-LAB: CPT | Performed by: OBSTETRICS & GYNECOLOGY

## 2021-07-20 RX ORDER — BREAST PUMP
1 EACH MISCELLANEOUS DAILY
Qty: 1 EACH | Refills: 0 | Status: SHIPPED | OUTPATIENT
Start: 2021-07-20 | End: 2022-07-11

## 2021-07-23 LAB
# FETUSES US: NORMAL
AFP MOM SERPL: 0.75
AFP SERPL-MCNC: 33 NG/ML
AGE - REPORTED: 33.3 YR
CURRENT SMOKER: NO
FAMILY MEMBER DISEASES HX: NO
GA METHOD: NORMAL
GA: NORMAL WK
IDDM PATIENT QL: NO
INTEGRATED SCN PATIENT-IMP: NORMAL
SPECIMEN DRAWN SERPL: NORMAL

## 2021-08-03 ENCOUNTER — ANCILLARY PROCEDURE (OUTPATIENT)
Dept: ULTRASOUND IMAGING | Facility: CLINIC | Age: 31
End: 2021-08-03
Attending: OBSTETRICS & GYNECOLOGY
Payer: COMMERCIAL

## 2021-08-03 ENCOUNTER — PRENATAL OFFICE VISIT (OUTPATIENT)
Dept: OBGYN | Facility: CLINIC | Age: 31
End: 2021-08-03
Attending: OBSTETRICS & GYNECOLOGY
Payer: COMMERCIAL

## 2021-08-03 VITALS — SYSTOLIC BLOOD PRESSURE: 82 MMHG | BODY MASS INDEX: 23.65 KG/M2 | DIASTOLIC BLOOD PRESSURE: 56 MMHG | WEIGHT: 151 LBS

## 2021-08-03 DIAGNOSIS — Z34.02 ENCOUNTER FOR SUPERVISION OF NORMAL FIRST PREGNANCY IN SECOND TRIMESTER: Primary | ICD-10-CM

## 2021-08-03 DIAGNOSIS — Z36.89 ENCOUNTER FOR FETAL ANATOMIC SURVEY: ICD-10-CM

## 2021-08-03 PROCEDURE — 99207 PR PRENATAL VISIT: CPT | Performed by: OBSTETRICS & GYNECOLOGY

## 2021-08-03 PROCEDURE — 76805 OB US >/= 14 WKS SNGL FETUS: CPT | Performed by: OBSTETRICS & GYNECOLOGY

## 2021-08-16 ENCOUNTER — VIRTUAL VISIT (OUTPATIENT)
Dept: FAMILY MEDICINE | Facility: CLINIC | Age: 31
End: 2021-08-16
Payer: COMMERCIAL

## 2021-08-16 DIAGNOSIS — Z3A.22 PREGNANCY WITH 22 COMPLETED WEEKS GESTATION: ICD-10-CM

## 2021-08-16 DIAGNOSIS — R39.15 URINARY URGENCY: Primary | ICD-10-CM

## 2021-08-16 PROBLEM — B37.31 CANDIDIASIS OF VULVA AND VAGINA: Status: RESOLVED | Noted: 2021-04-12 | Resolved: 2021-08-16

## 2021-08-16 LAB
ALBUMIN UR-MCNC: NEGATIVE MG/DL
APPEARANCE UR: CLEAR
BACTERIA #/AREA URNS HPF: NORMAL /HPF
BILIRUB UR QL STRIP: NEGATIVE
COLOR UR AUTO: YELLOW
GLUCOSE UR STRIP-MCNC: NEGATIVE MG/DL
HGB UR QL STRIP: NEGATIVE
KETONES UR STRIP-MCNC: NEGATIVE MG/DL
LEUKOCYTE ESTERASE UR QL STRIP: NEGATIVE
NITRATE UR QL: NEGATIVE
PH UR STRIP: 6 [PH] (ref 5–7)
RBC #/AREA URNS AUTO: NORMAL /HPF
SP GR UR STRIP: 1.02 (ref 1–1.03)
UROBILINOGEN UR STRIP-ACNC: 0.2 E.U./DL
WBC #/AREA URNS AUTO: NORMAL /HPF

## 2021-08-16 PROCEDURE — 81001 URINALYSIS AUTO W/SCOPE: CPT | Performed by: FAMILY MEDICINE

## 2021-08-16 PROCEDURE — 99214 OFFICE O/P EST MOD 30 MIN: CPT | Mod: 95 | Performed by: FAMILY MEDICINE

## 2021-08-16 NOTE — PROGRESS NOTES
Melba is a 30 year old who is being evaluated via a billable video visit.      How would you like to obtain your AVS? MyChart  If the video visit is dropped, the invitation should be resent by: Text to cell phone: [phone]  Will anyone else be joining your video visit? No      Video Start Time: 3: 50    Assessment & Plan     Urinary urgency  - UA with Microscopic reflex to Culture - lab collect; Future    Pregnancy with 22 completed weeks gestation    She is going to come into the clinic to leave a urine sample to further evaluate her symptoms and we will treat accordingly.  She will continue to follow closely with her OB/GYN provider.    Addendum: She came into the clinic later this afternoon and we checked a urinalysis which was completely normal.  She will continue to monitor symptoms for now.  I explained that she may be experiencing increased urgency and frequency due to the pregnancy.  She will continue to follow-up closely with her OB/GYN provider and will seek medical attention if she develops any signs or symptoms of an infection.                   No follow-ups on file.    Nigel Qureshi, [facility]    Subjective   Melba is a 30 year old who presents for the following health issues     HPI     Genitourinary - Female  Onset/Duration: x1 week   Description:   Painful urination (Dysuria): no           Frequency: YES and pelvic pressure?  Blood in urine (Hematuria): no  Delay in urine (Hesitency): no  Intensity: mild  Progression of Symptoms:  same  Accompanying Signs & Symptoms:  Fever/chills: no  Flank pain: no  Nausea and vomiting: no  Vaginal symptoms: none  Abdominal/Pelvic Pain: no, but can feel some pressure in her lower abdomen.  History:   History of frequent UTI s: No, but has a hx of frequent yeast infections  History of kidney stones: no  Sexually Active: YES  Possibility of pregnancy: Yes - pt currently pregnant (22 weeks and 5 days gestation), JOHNNIE  12/23/21  Precipitating or alleviating factors: none  Therapies tried and outcome:  none       She denies vaginal discharge or bleeding.    Review of Systems   Constitutional, HEENT, cardiovascular, pulmonary, gi and gu systems are negative, except as otherwise noted.      Objective           Vitals:  No vitals were obtained today due to virtual visit.    Physical Exam   GENERAL: Healthy, alert and no distress  EYES: Eyes grossly normal to inspection.  No discharge or erythema, or obvious scleral/conjunctival abnormalities.  RESP: No audible wheeze, cough, or visible cyanosis.  No visible retractions or increased work of breathing.    SKIN: Visible skin clear. No significant rash, abnormal pigmentation or lesions.  NEURO: Cranial nerves grossly intact.  Mentation and speech appropriate for age.  PSYCH: Mentation appears normal, affect normal/bright, judgement and insight intact, normal speech and appearance well-groomed.                Video-Visit Details    Type of service:  Video Visit    Video End Time:4:00 PM    Originating Location (pt. Location): Home    Distant Location (provider location):  [facility]     Platform used for Video Visit: [employer]

## 2021-08-31 ENCOUNTER — PRENATAL OFFICE VISIT (OUTPATIENT)
Dept: OBGYN | Facility: CLINIC | Age: 31
End: 2021-08-31
Payer: COMMERCIAL

## 2021-08-31 VITALS — DIASTOLIC BLOOD PRESSURE: 54 MMHG | WEIGHT: 153 LBS | BODY MASS INDEX: 23.96 KG/M2 | SYSTOLIC BLOOD PRESSURE: 94 MMHG

## 2021-08-31 DIAGNOSIS — Z34.02 ENCOUNTER FOR SUPERVISION OF NORMAL FIRST PREGNANCY IN SECOND TRIMESTER: Primary | ICD-10-CM

## 2021-08-31 PROCEDURE — 99207 PR PRENATAL VISIT: CPT | Performed by: OBSTETRICS & GYNECOLOGY

## 2021-09-13 ENCOUNTER — HOSPITAL ENCOUNTER (OUTPATIENT)
Facility: CLINIC | Age: 31
End: 2021-09-13
Admitting: OBSTETRICS & GYNECOLOGY
Payer: COMMERCIAL

## 2021-09-13 ENCOUNTER — APPOINTMENT (OUTPATIENT)
Dept: MRI IMAGING | Facility: CLINIC | Age: 31
End: 2021-09-13
Attending: EMERGENCY MEDICINE
Payer: COMMERCIAL

## 2021-09-13 ENCOUNTER — HOSPITAL ENCOUNTER (EMERGENCY)
Facility: CLINIC | Age: 31
Discharge: HOME | End: 2021-09-13
Attending: EMERGENCY MEDICINE | Admitting: EMERGENCY MEDICINE
Payer: COMMERCIAL

## 2021-09-13 ENCOUNTER — HOSPITAL ENCOUNTER (OUTPATIENT)
Facility: CLINIC | Age: 31
Discharge: HOME | End: 2021-09-13
Attending: OBSTETRICS & GYNECOLOGY | Admitting: OBSTETRICS & GYNECOLOGY
Payer: COMMERCIAL

## 2021-09-13 ENCOUNTER — NURSE TRIAGE (OUTPATIENT)
Dept: FAMILY MEDICINE | Facility: CLINIC | Age: 31
End: 2021-09-13

## 2021-09-13 VITALS
SYSTOLIC BLOOD PRESSURE: 101 MMHG | DIASTOLIC BLOOD PRESSURE: 73 MMHG | TEMPERATURE: 98.4 F | OXYGEN SATURATION: 100 % | BODY MASS INDEX: 24.12 KG/M2 | WEIGHT: 154 LBS | RESPIRATION RATE: 20 BRPM | HEART RATE: 86 BPM

## 2021-09-13 VITALS — TEMPERATURE: 98.4 F | SYSTOLIC BLOOD PRESSURE: 101 MMHG | DIASTOLIC BLOOD PRESSURE: 62 MMHG

## 2021-09-13 DIAGNOSIS — R10.31 ABDOMINAL PAIN, RIGHT LOWER QUADRANT: ICD-10-CM

## 2021-09-13 PROBLEM — Z36.89 ENCOUNTER FOR TRIAGE IN PREGNANT PATIENT: Status: ACTIVE | Noted: 2021-09-13

## 2021-09-13 LAB
ALBUMIN SERPL-MCNC: 2.7 G/DL (ref 3.4–5)
ALBUMIN UR-MCNC: NEGATIVE MG/DL
ALP SERPL-CCNC: 65 U/L (ref 40–150)
ALT SERPL W P-5'-P-CCNC: 24 U/L (ref 0–50)
ANION GAP SERPL CALCULATED.3IONS-SCNC: 4 MMOL/L (ref 3–14)
APPEARANCE UR: CLEAR
AST SERPL W P-5'-P-CCNC: 17 U/L (ref 0–45)
BASOPHILS # BLD AUTO: 0 10E3/UL (ref 0–0.2)
BASOPHILS NFR BLD AUTO: 0 %
BILIRUB SERPL-MCNC: 0.3 MG/DL (ref 0.2–1.3)
BILIRUB UR QL STRIP: NEGATIVE
BUN SERPL-MCNC: 8 MG/DL (ref 7–30)
CALCIUM SERPL-MCNC: 8.5 MG/DL (ref 8.5–10.1)
CHLORIDE BLD-SCNC: 105 MMOL/L (ref 94–109)
CO2 SERPL-SCNC: 28 MMOL/L (ref 20–32)
COLOR UR AUTO: ABNORMAL
CREAT SERPL-MCNC: 0.57 MG/DL (ref 0.52–1.04)
EOSINOPHIL # BLD AUTO: 0.1 10E3/UL (ref 0–0.7)
EOSINOPHIL NFR BLD AUTO: 2 %
ERYTHROCYTE [DISTWIDTH] IN BLOOD BY AUTOMATED COUNT: 13.3 % (ref 10–15)
FFN SPECIMEN INTEGRITY: NORMAL
FIBRONECTIN FETAL VAG QL: NEGATIVE
GFR SERPL CREATININE-BSD FRML MDRD: >90 ML/MIN/1.73M2
GLUCOSE BLD-MCNC: 82 MG/DL (ref 70–99)
GLUCOSE UR STRIP-MCNC: NEGATIVE MG/DL
HCT VFR BLD AUTO: 33.6 % (ref 35–47)
HGB BLD-MCNC: 11.3 G/DL (ref 11.7–15.7)
HGB UR QL STRIP: NEGATIVE
IMM GRANULOCYTES # BLD: 0 10E3/UL
IMM GRANULOCYTES NFR BLD: 0 %
KETONES UR STRIP-MCNC: NEGATIVE MG/DL
LEUKOCYTE ESTERASE UR QL STRIP: NEGATIVE
LYMPHOCYTES # BLD AUTO: 1.6 10E3/UL (ref 0.8–5.3)
LYMPHOCYTES NFR BLD AUTO: 18 %
MCH RBC QN AUTO: 30.3 PG (ref 26.5–33)
MCHC RBC AUTO-ENTMCNC: 33.6 G/DL (ref 31.5–36.5)
MCV RBC AUTO: 90 FL (ref 78–100)
MONOCYTES # BLD AUTO: 0.6 10E3/UL (ref 0–1.3)
MONOCYTES NFR BLD AUTO: 7 %
MUCOUS THREADS #/AREA URNS LPF: PRESENT /LPF
NEUTROPHILS # BLD AUTO: 6.3 10E3/UL (ref 1.6–8.3)
NEUTROPHILS NFR BLD AUTO: 73 %
NITRATE UR QL: NEGATIVE
NRBC # BLD AUTO: 0 10E3/UL
NRBC BLD AUTO-RTO: 0 /100
PH UR STRIP: 6.5 [PH] (ref 5–7)
PLATELET # BLD AUTO: 217 10E3/UL (ref 150–450)
POTASSIUM BLD-SCNC: 3.5 MMOL/L (ref 3.4–5.3)
PROT SERPL-MCNC: 6.5 G/DL (ref 6.8–8.8)
RBC # BLD AUTO: 3.73 10E6/UL (ref 3.8–5.2)
RBC URINE: 2 /HPF
SODIUM SERPL-SCNC: 137 MMOL/L (ref 133–144)
SP GR UR STRIP: 1.02 (ref 1–1.03)
SQUAMOUS EPITHELIAL: 1 /HPF
UROBILINOGEN UR STRIP-MCNC: NORMAL MG/DL
WBC # BLD AUTO: 8.6 10E3/UL (ref 4–11)
WBC URINE: 1 /HPF

## 2021-09-13 PROCEDURE — 85025 COMPLETE CBC W/AUTO DIFF WBC: CPT | Performed by: EMERGENCY MEDICINE

## 2021-09-13 PROCEDURE — G0463 HOSPITAL OUTPT CLINIC VISIT: HCPCS | Mod: 25

## 2021-09-13 PROCEDURE — 74181 MRI ABDOMEN W/O CONTRAST: CPT

## 2021-09-13 PROCEDURE — 36415 COLL VENOUS BLD VENIPUNCTURE: CPT | Performed by: EMERGENCY MEDICINE

## 2021-09-13 PROCEDURE — 99284 EMERGENCY DEPT VISIT MOD MDM: CPT | Mod: 25

## 2021-09-13 PROCEDURE — 81003 URINALYSIS AUTO W/O SCOPE: CPT | Performed by: OBSTETRICS & GYNECOLOGY

## 2021-09-13 PROCEDURE — 80053 COMPREHEN METABOLIC PANEL: CPT | Performed by: EMERGENCY MEDICINE

## 2021-09-13 PROCEDURE — 82731 ASSAY OF FETAL FIBRONECTIN: CPT | Performed by: OBSTETRICS & GYNECOLOGY

## 2021-09-13 RX ORDER — BETAMETHASONE SODIUM PHOSPHATE AND BETAMETHASONE ACETATE 3; 3 MG/ML; MG/ML
12 INJECTION, SUSPENSION INTRA-ARTICULAR; INTRALESIONAL; INTRAMUSCULAR; SOFT TISSUE EVERY 24 HOURS
Status: DISCONTINUED | OUTPATIENT
Start: 2021-09-13 | End: 2021-09-13 | Stop reason: CLARIF

## 2021-09-13 RX ORDER — LIDOCAINE 40 MG/G
CREAM TOPICAL
Status: DISCONTINUED | OUTPATIENT
Start: 2021-09-13 | End: 2021-09-13 | Stop reason: HOSPADM

## 2021-09-13 ASSESSMENT — ENCOUNTER SYMPTOMS
VOMITING: 0
ABDOMINAL PAIN: 1
NAUSEA: 1
FEVER: 0

## 2021-09-13 NOTE — TELEPHONE ENCOUNTER
"Yesterday stabbing pain right side, waist lower right side  Lie down, out of car, bend.     Pregnancy, 26 weeks.     Tracy,     Currently at work, will go in a \"couple hours.\"    S-(situation): Patient calling to look for guidance regarding current symptoms.     B-(background): Patient is about 26 weeks pregnant and having abdominal pain.     A-(assessment): Patient states that she had abdominal pain starting yesterday. Pain described as sharp pain in her right side below her waist line. Patient describes feels better when she sits down; pain increases when bending or standing up straight.     R-(recommendations): Due to pregnancy and abdominal pain, patient advised to go to Emergency Department due to resources.     2nd tier triage performed, in-clinic provider confirmed ED appropriate.     Advised patient to call back if in need of further guidance; patient agreed.     SUMI LópezN, RN  [facility]        Reason for Disposition    Abdominal pain and pregnant > 20 weeks    Sounds like a life-threatening emergency to the triager    MODERATE-SEVERE abdominal pain (e.g., interferes with normal activities, awakens from sleep)    Additional Information    Negative: Passed out (i.e., fainted, collapsed and was not responding)    Negative: Shock suspected (e.g., cold/pale/clammy skin, too weak to stand, low BP, rapid pulse)    Negative: Sounds like a life-threatening emergency to the triager    Negative: Chest pain    Negative: Pain is mainly in upper abdomen (if needed ask: 'is it mainly above the belly button?')    Negative: Abdominal pain and pregnant < 20 weeks    Negative: SEVERE abdominal pain (e.g., excruciating)    Negative: Vomiting red blood or black (coffee ground) material    Negative: Bloody, black, or tarry bowel movements (Exception: chronic-unchanged black-grey bowel movements and is taking iron pills or Pepto-bismol)    Negative: Vomiting red blood or black (coffee " ground) material    Negative: Having contractions or other symptoms of labor (such as vaginal pressure) and > 36 weeks pregnant (i.e., term pregnancy)    Negative: Having contractions or other symptoms of labor (such as vaginal pressure) and < 37 weeks pregnant (i.e., )    Negative: Abdominal pain and pregnant < 20 weeks    Negative: Passed out (i.e., fainted, collapsed and was not responding)    Negative: Shock suspected (e.g., cold/pale/clammy skin, too weak to stand, low BP, rapid pulse)    Negative: Difficult to awaken or acting confused (e.g., disoriented, slurred speech)    Negative: SEVERE abdominal pain (e.g., excruciating), constant, and present > 1 hour    Negative: SEVERE vaginal bleeding (e.g., continuous red blood from vagina, large blood clots)    Protocols used: ABDOMINAL PAIN - FEMALE-A-OH, PREGNANCY - ABDOMINAL PAIN GREATER THAN 20 WEEKS EGA-A-OH

## 2021-09-13 NOTE — DISCHARGE INSTRUCTIONS
Discharge Instruction for Undelivered Patients      You were seen for: Right Sided Abdominal Pain  We Consulted: Dr. Alfaro  You had (Test or Medicine): NST, UA, FFN     Diet:   Drink 8 to 12 glasses of liquids (milk, juice, water) every day.  You may eat meals and snacks.     Activity:  Call your doctor or nurse midwife if your baby is moving less than usual.     Call your provider if you notice:  Swelling in your face or increased swelling in your hands or legs.  Headaches that are not relieved by Tylenol (acetaminophen).  Changes in your vision (blurring: seeing spots or stars.)  Nausea (sick to your stomach) and vomiting (throwing up).   Weight gain of 5 pounds or more per week.  Heartburn that doesn't go away.  Signs of bladder infection: pain when you urinate (use the toilet), need to go more often and more urgently.  The bag of tobar (rupture of membranes) breaks, or you notice leaking in your underwear.  Bright red blood in your underwear.  Abdominal (lower belly) or stomach pain.  For first baby: Contractions (tightening) less than 5 minutes apart for one hour or more.  Second (plus) baby: Contractions (tightening) less than 10 minutes apart and getting stronger.  *If less than 34 weeks: Contractions (tightening) more than 6 times in one hour.  Increase or change in vaginal discharge (note the color and amount)      Follow-up:  As scheduled in the clinic

## 2021-09-13 NOTE — ED TRIAGE NOTES
Pt presents with complaints of R sided abdominal pain. Pt is 25w4d pregnant and had full evaluation at [facility] prior to coming to ED. Pt states pain began last night, went away and now has been persistent since this morning. Pt states pain started central and now moved to R side. Pt reports worsening pain with movement and nausea, no vomiting. No fevers

## 2021-09-13 NOTE — PLAN OF CARE
at 25w4d admitted to [facility] for right sided abdominal pain.  She states that it has been happening for the past couple days and is constant pain at a 5 and then when she moves or it is touched it increases to a 7.  Vitals stable, afebrile.  Placed on monitors with patient consent.  FHTs in 140s.  She has had contractions on monitors, but does not feel them and does not seem to correlate with the pain.  Contractions about every 4-8.  Patient states she feels like it might be her appendix and would like work up for this.  Dr Alfaro called and orders received for FFN and cervical exam.  If closed and negative FFN patient may discharge and go to ER for further evaluation.  Cervix is closed/thick/high.  FFN pending.

## 2021-09-14 NOTE — ED PROVIDER NOTES
History   Chief Complaint:  Abdominal Pain     The history is provided by the patient.      Melba Galvan is a 31 year old otherwise healthy female  who presents with abdominal pain. Patient reports that she has been having right lower quadrant pain with constant nausea since last night. States the pain is exacerbated when changing positions and any movement. Denies vomiting and fevers. Of note, the patient is 25 weeks pregnant and had an evaluation [place] at [facility] before coming to the ED.     Review of Systems   Constitutional: Negative for fever.   Gastrointestinal: Positive for abdominal pain and nausea. Negative for vomiting.   All other systems reviewed and are negative.    Allergies:  No Known Allergies    Medications:  The patient is currently on no regular medications.    Past Medical History:    The patient denies past medical history.     Social History:  Presents with male associate      Physical Exam     Patient Vitals for the past 24 hrs:   BP Temp Temp src Pulse Resp SpO2 Weight   21 2153 101/73 -- -- -- -- 100 % --   21 1550 96/57 98.4  F (36.9  C) Oral 86 20 99 % 69.9 kg (154 lb)       Physical Exam  General: Resting on the gurney, appears uncomfortable  Head:  The scalp, face, and head appear normal  Mouth/Throat: Mucus membranes are moist  CV:  Regular rate    Normal S1 and S2  No pathological murmur   Resp:  Breath sounds clear and equal bilaterally    Non-labored, no retractions or accessory muscle use    No coarseness    No wheezing   GI:  Abdomen is soft, no rigidity    Right sided abdominal tenderness to palpation. No guarding, no rebound. Gravid uterus.   MS:  Normal motor assessment of all extremities.    Good capillary refill noted.      Skin:   No rash or lesions noted.  Neuro:  Speech is normal and fluent. No apparent deficit.  Psych:              Awake. Alert.  Normal affect.      Appropriate interactions.    Emergency Department Course   Imaging:  MR Abdomen w/o  contrast:  1.  No evidence for appendicitis.   2.  Single intrauterine gestation in breech position, as per radiology.     Laboratory:  CBC: WBC 8.6, HGB 11.3 (L),   CMP: Glucose 82, Albumin: 2.7 (L), Protein Total: 6.5 (L), o/w WNL (Creatinine: 0.57)     Emergency Department Course:    Reviewed:  I reviewed nursing notes, vitals, past medical history and care everywhere    Assessments:  2030 I obtained history and examined the patient as noted above.   2137 I rechecked the patient and explained findings.     Disposition:  The patient was discharged to home.     Impression & Plan   Medical Decision Making:  Melba Galvan is a 31 year old female who presents with right lower quadrant abdominal pain.   She is currently pregnant. They look overall well and have a reassuring exam. A broad differential diagnosis was of course considered including worrisome and rare etiologies of abdominal pain.  MRI obtained to evaluate for appendicitis and was negative.  Idisucssed US for torsion/ovarian pathology as well as for evaluation of hepatobiliary disease.  She refuses this as she reports her only concern was appendicitis and she would not haveo therwise sought care.  The workup in the ED is at this point negative. No etiology for the patients pain is found at this point and my suspicion of an intraabdominal catastrophe or other worrisome etiology is very low. I will not therefore admit for serial exams and further workup. Patient is hemodynamically stable in ED. Return for fevers greater than 102, increasing pain, other new symptoms develop. She understands she is at higher risk for certain problems like gallbladder and appendix problems now that she is pregnant but my suspicion is low enough that a detailed workup plus hospitalization for surgical consult is not recommended.  Abdominal pain handout given. Questions were answered.     Diagnosis:    ICD-10-CM    1. Abdominal pain, right lower quadrant  R10.31         Scribe Disclosure:  I, Karen Ho, am serving as a scribe at 8:26 PM on 9/13/2021 to document services personally performed by Kell Rogers MD based on my observations and the provider's statements to me.            Kell Rogers MD  09/14/21 0021

## 2021-09-20 ENCOUNTER — PRENATAL OFFICE VISIT (OUTPATIENT)
Dept: OBGYN | Facility: CLINIC | Age: 31
End: 2021-09-20
Payer: COMMERCIAL

## 2021-09-20 VITALS — WEIGHT: 156 LBS | SYSTOLIC BLOOD PRESSURE: 98 MMHG | DIASTOLIC BLOOD PRESSURE: 54 MMHG | BODY MASS INDEX: 24.43 KG/M2

## 2021-09-20 DIAGNOSIS — O26.899 PREGNANCY WITH ABDOMINAL PAIN OF RIGHT LOWER QUADRANT, ANTEPARTUM: Primary | ICD-10-CM

## 2021-09-20 DIAGNOSIS — R10.31 PREGNANCY WITH ABDOMINAL PAIN OF RIGHT LOWER QUADRANT, ANTEPARTUM: Primary | ICD-10-CM

## 2021-09-20 PROCEDURE — 99213 OFFICE O/P EST LOW 20 MIN: CPT | Performed by: OBSTETRICS & GYNECOLOGY

## 2021-09-20 NOTE — PROGRESS NOTES
Melba Galvan is a 31 year old female who presents to clinic today for the following health issue(s):  Patient presents with:  Prenatal Care: 26w4d  Abdominal Pain      HPI:  Started having abdominal pain on RLQ, was pretty severe. Still there, comes with movements.   No urinary issues. No hx kidney stones.   Walks a lot. Has been moving in last couple days.   Went to ED 9/13/21 and [facility]. Had neg MRI. Neg FFN and nl eval in [facility]. Note and labs and imaging reveiwed.   No lof/vb/ctx.     +FHT  Abd: gravid, soft, ND, NT    Assessement/Plan:    -Abd pain, likely MSK in origin, such as round ligament pain. Discussed maternity support belt, rx given. Also discussed pregnancy spider on [employer]. Rec stretching exercises, is otherwise active.  -F/U next week as scheduled for visit with [facility] and glucola  -PTL precautions      (20 minutes was spent on the date of the encounter doing chart review, review and of pertinent test results, history and/or exam, documentation, patient counseling.)    Erinn Wilcox Masters, DO

## 2021-09-20 NOTE — PROGRESS NOTES
SUBJECTIVE:                                                   Melba Galvan is a 31 year old female who presents to clinic today for the following health issue(s):  Patient presents with:  Prenatal Care: 26w4d        HPI:  ***    Patient's last menstrual period was 2021..     Patient is sexually active, .  Using none for contraception.    reports that she quit smoking about 12 months ago. Her smoking use included hookah. She started smoking about 11 years ago. She has a 5.00 pack-year smoking history. She has never used smokeless tobacco.    STD testing offered?  Declined    Health maintenance updated:  yes    Today's PHQ-2 Score:   PHQ-2 (  [employer]) 2021   Q1: Little interest or pleasure in doing things 0   Q2: Feeling down, depressed or hopeless 0   PHQ-2 Score 0   Q1: Little interest or pleasure in doing things Not at all   Q2: Feeling down, depressed or hopeless Not at all   PHQ-2 Score 0     Today's PHQ-9 Score:   PHQ-9 SCORE 2021   PHQ-9 Total Score MyChart 2 (Minimal depression)   PHQ-9 Total Score 2     Today's BAL-7 Score:   BAL-7 SCORE 2021   Total Score 6 (mild anxiety)   Total Score 6       Problem list and histories reviewed & adjusted, as indicated.  Additional history: as documented.    Patient Active Problem List   Diagnosis     Screening for cervical cancer     Supervision of normal IUP (intrauterine pregnancy) in primigravida     Encounter for triage in pregnant patient     History reviewed. No pertinent surgical history.   Social History     Tobacco Use     Smoking status: Former Smoker     Packs/day: 0.50     Years: 10.00     Pack years: 5.00     Types: Hookah     Start date: 2010     Quit date: 2020     Years since quittin.0     Smokeless tobacco: Never Used   Substance Use Topics     Alcohol use: Never           Current Outpatient Medications   Medication Sig     Prenatal w/o A Vit-Fe Fum-FA (PRENATA PO)      Misc. Devices (BREAST PUMP) MISC 1 each  daily     No current facility-administered medications for this visit.     No Known Allergies    ROS:  12 point review of systems negative other than symptoms noted below or in the HPI.  Gastrointestinal: Abdominal Pain  No urinary frequency or dysuria, bladder or kidney problems      OBJECTIVE:     BP 98/54   Wt 70.8 kg (156 lb)   LMP 03/18/2021   BMI 24.43 kg/m    Body mass index is 24.43 kg/m .    Exam:  {[facility] EXAM CHOICES:[id number]}     In-Clinic Test Results:  No results found for this or any previous visit (from the past 24 hour(s)).    ASSESSMENT/PLAN:                                                      No diagnosis found.    There are no Patient Instructions on file for this visit.    ***    Erinn Wilcox Masters, [facility]

## 2021-09-21 DIAGNOSIS — Z34.03 ENCOUNTER FOR SUPERVISION OF NORMAL FIRST PREGNANCY IN THIRD TRIMESTER: Primary | ICD-10-CM

## 2021-09-21 DIAGNOSIS — Z23 NEED FOR TDAP VACCINATION: ICD-10-CM

## 2021-09-22 ENCOUNTER — MYC MEDICAL ADVICE (OUTPATIENT)
Dept: OBGYN | Facility: CLINIC | Age: 31
End: 2021-09-22

## 2021-09-28 ENCOUNTER — LAB (OUTPATIENT)
Dept: LAB | Facility: CLINIC | Age: 31
End: 2021-09-28
Payer: COMMERCIAL

## 2021-09-28 ENCOUNTER — PRENATAL OFFICE VISIT (OUTPATIENT)
Dept: OBGYN | Facility: CLINIC | Age: 31
End: 2021-09-28
Payer: COMMERCIAL

## 2021-09-28 VITALS — WEIGHT: 163 LBS | BODY MASS INDEX: 25.53 KG/M2 | SYSTOLIC BLOOD PRESSURE: 98 MMHG | DIASTOLIC BLOOD PRESSURE: 68 MMHG

## 2021-09-28 DIAGNOSIS — Z34.03 ENCOUNTER FOR SUPERVISION OF NORMAL FIRST PREGNANCY IN THIRD TRIMESTER: ICD-10-CM

## 2021-09-28 DIAGNOSIS — Z34.03 ENCOUNTER FOR SUPERVISION OF NORMAL FIRST PREGNANCY IN THIRD TRIMESTER: Primary | ICD-10-CM

## 2021-09-28 LAB
ERYTHROCYTE [DISTWIDTH] IN BLOOD BY AUTOMATED COUNT: 13.1 % (ref 10–15)
GLUCOSE 1H P 50 G GLC PO SERPL-MCNC: 96 MG/DL (ref 70–129)
HCT VFR BLD AUTO: 32.7 % (ref 35–47)
HGB BLD-MCNC: 10.9 G/DL (ref 11.7–15.7)
MCH RBC QN AUTO: 30.5 PG (ref 26.5–33)
MCHC RBC AUTO-ENTMCNC: 33.3 G/DL (ref 31.5–36.5)
MCV RBC AUTO: 92 FL (ref 78–100)
PLATELET # BLD AUTO: 212 10E3/UL (ref 150–450)
RBC # BLD AUTO: 3.57 10E6/UL (ref 3.8–5.2)
WBC # BLD AUTO: 8.1 10E3/UL (ref 4–11)

## 2021-09-28 PROCEDURE — 99207 PR PRENATAL VISIT: CPT | Performed by: OBSTETRICS & GYNECOLOGY

## 2021-09-28 PROCEDURE — 85027 COMPLETE CBC AUTOMATED: CPT

## 2021-09-28 PROCEDURE — 36415 COLL VENOUS BLD VENIPUNCTURE: CPT

## 2021-09-28 PROCEDURE — 82952 GTT-ADDED SAMPLES: CPT

## 2021-09-28 NOTE — PROGRESS NOTES
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

## 2021-10-13 ENCOUNTER — PRENATAL OFFICE VISIT (OUTPATIENT)
Dept: OBGYN | Facility: CLINIC | Age: 31
End: 2021-10-13
Payer: COMMERCIAL

## 2021-10-13 VITALS — SYSTOLIC BLOOD PRESSURE: 98 MMHG | DIASTOLIC BLOOD PRESSURE: 60 MMHG | BODY MASS INDEX: 26.31 KG/M2 | WEIGHT: 168 LBS

## 2021-10-13 DIAGNOSIS — Z23 NEED FOR PROPHYLACTIC VACCINATION WITH COMBINED DIPHTHERIA-TETANUS-PERTUSSIS (DTP) VACCINE: ICD-10-CM

## 2021-10-13 DIAGNOSIS — Z34.03 ENCOUNTER FOR SUPERVISION OF NORMAL FIRST PREGNANCY IN THIRD TRIMESTER: Primary | ICD-10-CM

## 2021-10-13 PROCEDURE — 90471 IMMUNIZATION ADMIN: CPT | Performed by: OBSTETRICS & GYNECOLOGY

## 2021-10-13 PROCEDURE — 90715 TDAP VACCINE 7 YRS/> IM: CPT | Performed by: OBSTETRICS & GYNECOLOGY

## 2021-10-13 PROCEDURE — 99207 PR PRENATAL VISIT: CPT | Performed by: OBSTETRICS & GYNECOLOGY

## 2021-10-13 NOTE — NURSING NOTE
Prior to immunization administration, verified patients identity using patient s name and date of birth. Please see Immunization Activity for additional information.     Screening Questionnaire for Adult Immunization    Are you sick today?   No   Do you have allergies to medications, food, a vaccine component or latex?   No   Have you ever had a serious reaction after receiving a vaccination?   No   Do you have a long-term health problem with heart, lung, kidney, or metabolic disease (e.g., diabetes), asthma, a blood disorder, no spleen, complement component deficiency, a cochlear implant, or a spinal fluid leak?  Are you on long-term aspirin therapy?   No   Do you have cancer, leukemia, HIV/AIDS, or any other immune system problem?   No   Do you have a parent, brother, or sister with an immune system problem?   No   In the past 3 months, have you taken medications that affect  your immune system, such as prednisone, other steroids, or anticancer drugs; drugs for the treatment of rheumatoid arthritis, Crohn s disease, or psoriasis; or have you had radiation treatments?   No   Have you had a seizure, or a brain or other nervous system problem?   No   During the past year, have you received a transfusion of blood or blood    products, or been given immune (gamma) globulin or antiviral drug?   No   For women: Are you pregnant or is there a chance you could become       pregnant during the next month?   Yes   Have you received any vaccinations in the past 4 weeks?   No     Immunization questionnaire answers were all negative.        Per orders of Dr. Bowden, injection of tdap given by Eva Cheek CMA. Patient instructed to remain in clinic for 15 minutes afterwards, and to report any adverse reaction to me immediately.       Screening performed by Eva Cheek MA on 10/13/2021 at 3:27 PM.

## 2021-10-17 ENCOUNTER — HEALTH MAINTENANCE LETTER (OUTPATIENT)
Age: 31
End: 2021-10-17

## 2021-10-27 ENCOUNTER — PRENATAL OFFICE VISIT (OUTPATIENT)
Dept: OBGYN | Facility: CLINIC | Age: 31
End: 2021-10-27
Payer: COMMERCIAL

## 2021-10-27 VITALS — BODY MASS INDEX: 25.87 KG/M2 | DIASTOLIC BLOOD PRESSURE: 58 MMHG | SYSTOLIC BLOOD PRESSURE: 98 MMHG | WEIGHT: 165.2 LBS

## 2021-10-27 DIAGNOSIS — Z34.02 ENCOUNTER FOR SUPERVISION OF NORMAL FIRST PREGNANCY IN SECOND TRIMESTER: Primary | ICD-10-CM

## 2021-10-27 PROCEDURE — 99207 PR PRENATAL VISIT: CPT | Performed by: OBSTETRICS & GYNECOLOGY

## 2021-10-27 NOTE — PROGRESS NOTES
Good fm, very active  Lots of gas and contstipation   Taking iron  May have to sptread out to every other day on iron

## 2021-11-09 ENCOUNTER — PRENATAL OFFICE VISIT (OUTPATIENT)
Dept: OBGYN | Facility: CLINIC | Age: 31
End: 2021-11-09
Payer: COMMERCIAL

## 2021-11-09 VITALS — SYSTOLIC BLOOD PRESSURE: 96 MMHG | BODY MASS INDEX: 26.47 KG/M2 | WEIGHT: 169 LBS | DIASTOLIC BLOOD PRESSURE: 60 MMHG

## 2021-11-09 DIAGNOSIS — Z34.02 ENCOUNTER FOR SUPERVISION OF NORMAL FIRST PREGNANCY IN SECOND TRIMESTER: Primary | ICD-10-CM

## 2021-11-09 PROCEDURE — 99207 PR PRENATAL VISIT: CPT | Performed by: OBSTETRICS & GYNECOLOGY

## 2021-11-09 NOTE — PROGRESS NOTES
Gained 4 pounds since last visit  Has acid reflux- discussed liquid antacid and omeprazole  Try to take the iron  Check hgb next visit  Good fm

## 2021-11-23 ENCOUNTER — PRENATAL OFFICE VISIT (OUTPATIENT)
Dept: OBGYN | Facility: CLINIC | Age: 31
End: 2021-11-23
Payer: COMMERCIAL

## 2021-11-23 VITALS — DIASTOLIC BLOOD PRESSURE: 62 MMHG | BODY MASS INDEX: 26.34 KG/M2 | SYSTOLIC BLOOD PRESSURE: 100 MMHG | WEIGHT: 168.2 LBS

## 2021-11-23 DIAGNOSIS — Z34.03 ENCOUNTER FOR SUPERVISION OF NORMAL FIRST PREGNANCY IN THIRD TRIMESTER: Primary | ICD-10-CM

## 2021-11-23 DIAGNOSIS — Z36.85 SCREENING, ANTENATAL, FOR STREPTOCOCCUS B: ICD-10-CM

## 2021-11-23 LAB — HGB BLD-MCNC: 11.1 G/DL (ref 11.7–15.7)

## 2021-11-23 PROCEDURE — 99207 PR PRENATAL VISIT: CPT | Performed by: OBSTETRICS & GYNECOLOGY

## 2021-11-23 PROCEDURE — 85018 HEMOGLOBIN: CPT | Performed by: OBSTETRICS & GYNECOLOGY

## 2021-11-23 PROCEDURE — 36415 COLL VENOUS BLD VENIPUNCTURE: CPT | Performed by: OBSTETRICS & GYNECOLOGY

## 2021-11-23 PROCEDURE — 87653 STREP B DNA AMP PROBE: CPT | Performed by: OBSTETRICS & GYNECOLOGY

## 2021-11-25 LAB
GP B STREP DNA SPEC QL NAA+PROBE: NEGATIVE
PATIENT PENICILLIN, AMOXICILLIN, CEPHALOSPORINS ALLERGY: NO

## 2021-12-02 ENCOUNTER — PRENATAL OFFICE VISIT (OUTPATIENT)
Dept: OBGYN | Facility: CLINIC | Age: 31
End: 2021-12-02
Payer: COMMERCIAL

## 2021-12-02 VITALS — WEIGHT: 174 LBS | BODY MASS INDEX: 27.25 KG/M2 | SYSTOLIC BLOOD PRESSURE: 108 MMHG | DIASTOLIC BLOOD PRESSURE: 64 MMHG

## 2021-12-02 DIAGNOSIS — Z34.03 ENCOUNTER FOR SUPERVISION OF NORMAL FIRST PREGNANCY IN THIRD TRIMESTER: Primary | ICD-10-CM

## 2021-12-02 PROCEDURE — 99207 PR PRENATAL VISIT: CPT | Performed by: OBSTETRICS & GYNECOLOGY

## 2021-12-02 NOTE — PROGRESS NOTES
hgb improving on iron  Normal bp  GBS negative  Cervix closed/90%/-1/post  Discussed labor signs  Return 1 wk

## 2021-12-07 ENCOUNTER — ANESTHESIA (OUTPATIENT)
Dept: OBGYN | Facility: CLINIC | Age: 31
End: 2021-12-07
Payer: COMMERCIAL

## 2021-12-07 ENCOUNTER — ANESTHESIA EVENT (OUTPATIENT)
Dept: OBGYN | Facility: CLINIC | Age: 31
End: 2021-12-07
Payer: COMMERCIAL

## 2021-12-07 ENCOUNTER — HOSPITAL ENCOUNTER (INPATIENT)
Facility: CLINIC | Age: 31
LOS: 2 days | Discharge: HOME HEALTH | End: 2021-12-09
Attending: OBSTETRICS & GYNECOLOGY | Admitting: OBSTETRICS & GYNECOLOGY
Payer: COMMERCIAL

## 2021-12-07 LAB
ABO/RH(D): NORMAL
ANTIBODY SCREEN: NEGATIVE
BASOPHILS # BLD AUTO: 0 10E3/UL (ref 0–0.2)
BASOPHILS NFR BLD AUTO: 0 %
EOSINOPHIL # BLD AUTO: 0.1 10E3/UL (ref 0–0.7)
EOSINOPHIL NFR BLD AUTO: 1 %
ERYTHROCYTE [DISTWIDTH] IN BLOOD BY AUTOMATED COUNT: 13.9 % (ref 10–15)
HCT VFR BLD AUTO: 35.7 % (ref 35–47)
HGB BLD-MCNC: 11.9 G/DL (ref 11.7–15.7)
IMM GRANULOCYTES # BLD: 0.1 10E3/UL
IMM GRANULOCYTES NFR BLD: 1 %
LYMPHOCYTES # BLD AUTO: 1.8 10E3/UL (ref 0.8–5.3)
LYMPHOCYTES NFR BLD AUTO: 18 %
MCH RBC QN AUTO: 29.5 PG (ref 26.5–33)
MCHC RBC AUTO-ENTMCNC: 33.3 G/DL (ref 31.5–36.5)
MCV RBC AUTO: 88 FL (ref 78–100)
MONOCYTES # BLD AUTO: 0.6 10E3/UL (ref 0–1.3)
MONOCYTES NFR BLD AUTO: 6 %
NEUTROPHILS # BLD AUTO: 7.5 10E3/UL (ref 1.6–8.3)
NEUTROPHILS NFR BLD AUTO: 74 %
NRBC # BLD AUTO: 0 10E3/UL
NRBC BLD AUTO-RTO: 0 /100
PLATELET # BLD AUTO: 247 10E3/UL (ref 150–450)
RBC # BLD AUTO: 4.04 10E6/UL (ref 3.8–5.2)
RUPTURE OF FETAL MEMBRANES BY ROM PLUS: POSITIVE
SARS-COV-2 RNA RESP QL NAA+PROBE: NEGATIVE
SPECIMEN EXPIRATION DATE: NORMAL
WBC # BLD AUTO: 10.1 10E3/UL (ref 4–11)

## 2021-12-07 PROCEDURE — C9803 HOPD COVID-19 SPEC COLLECT: HCPCS

## 2021-12-07 PROCEDURE — 3E0R3BZ INTRODUCTION OF ANESTHETIC AGENT INTO SPINAL CANAL, PERCUTANEOUS APPROACH: ICD-10-PCS | Performed by: ANESTHESIOLOGY

## 2021-12-07 PROCEDURE — 36415 COLL VENOUS BLD VENIPUNCTURE: CPT | Performed by: OBSTETRICS & GYNECOLOGY

## 2021-12-07 PROCEDURE — 59025 FETAL NON-STRESS TEST: CPT

## 2021-12-07 PROCEDURE — 250N000011 HC RX IP 250 OP 636: Performed by: ANESTHESIOLOGY

## 2021-12-07 PROCEDURE — 86780 TREPONEMA PALLIDUM: CPT | Performed by: OBSTETRICS & GYNECOLOGY

## 2021-12-07 PROCEDURE — 00HU33Z INSERTION OF INFUSION DEVICE INTO SPINAL CANAL, PERCUTANEOUS APPROACH: ICD-10-PCS | Performed by: ANESTHESIOLOGY

## 2021-12-07 PROCEDURE — 86900 BLOOD TYPING SEROLOGIC ABO: CPT | Performed by: OBSTETRICS & GYNECOLOGY

## 2021-12-07 PROCEDURE — 120N000001 HC R&B MED SURG/OB

## 2021-12-07 PROCEDURE — 84112 EVAL AMNIOTIC FLUID PROTEIN: CPT | Performed by: OBSTETRICS & GYNECOLOGY

## 2021-12-07 PROCEDURE — 258N000003 HC RX IP 258 OP 636: Performed by: OBSTETRICS & GYNECOLOGY

## 2021-12-07 PROCEDURE — 370N000003 HC ANESTHESIA WARD SERVICE

## 2021-12-07 PROCEDURE — 250N000009 HC RX 250: Performed by: ANESTHESIOLOGY

## 2021-12-07 PROCEDURE — 85004 AUTOMATED DIFF WBC COUNT: CPT | Performed by: OBSTETRICS & GYNECOLOGY

## 2021-12-07 PROCEDURE — G0463 HOSPITAL OUTPT CLINIC VISIT: HCPCS | Mod: 25

## 2021-12-07 PROCEDURE — 87635 SARS-COV-2 COVID-19 AMP PRB: CPT | Performed by: OBSTETRICS & GYNECOLOGY

## 2021-12-07 RX ORDER — OXYTOCIN/0.9 % SODIUM CHLORIDE 30/500 ML
340 PLASTIC BAG, INJECTION (ML) INTRAVENOUS CONTINUOUS PRN
Status: DISCONTINUED | OUTPATIENT
Start: 2021-12-07 | End: 2021-12-08

## 2021-12-07 RX ORDER — PROCHLORPERAZINE 25 MG
25 SUPPOSITORY, RECTAL RECTAL EVERY 12 HOURS PRN
Status: DISCONTINUED | OUTPATIENT
Start: 2021-12-07 | End: 2021-12-08

## 2021-12-07 RX ORDER — ACETAMINOPHEN 325 MG/1
650 TABLET ORAL EVERY 4 HOURS PRN
Status: DISCONTINUED | OUTPATIENT
Start: 2021-12-07 | End: 2021-12-08

## 2021-12-07 RX ORDER — METOCLOPRAMIDE 10 MG/1
10 TABLET ORAL EVERY 6 HOURS PRN
Status: DISCONTINUED | OUTPATIENT
Start: 2021-12-07 | End: 2021-12-07 | Stop reason: HOSPADM

## 2021-12-07 RX ORDER — NALOXONE HYDROCHLORIDE 0.4 MG/ML
0.4 INJECTION, SOLUTION INTRAMUSCULAR; INTRAVENOUS; SUBCUTANEOUS
Status: DISCONTINUED | OUTPATIENT
Start: 2021-12-07 | End: 2021-12-08

## 2021-12-07 RX ORDER — OXYTOCIN 10 [USP'U]/ML
10 INJECTION, SOLUTION INTRAMUSCULAR; INTRAVENOUS
Status: DISCONTINUED | OUTPATIENT
Start: 2021-12-07 | End: 2021-12-08

## 2021-12-07 RX ORDER — METOCLOPRAMIDE HYDROCHLORIDE 5 MG/ML
10 INJECTION INTRAMUSCULAR; INTRAVENOUS EVERY 6 HOURS PRN
Status: DISCONTINUED | OUTPATIENT
Start: 2021-12-07 | End: 2021-12-07 | Stop reason: HOSPADM

## 2021-12-07 RX ORDER — KETOROLAC TROMETHAMINE 30 MG/ML
30 INJECTION, SOLUTION INTRAMUSCULAR; INTRAVENOUS
Status: DISCONTINUED | OUTPATIENT
Start: 2021-12-07 | End: 2021-12-08

## 2021-12-07 RX ORDER — ONDANSETRON 2 MG/ML
4 INJECTION INTRAMUSCULAR; INTRAVENOUS EVERY 6 HOURS PRN
Status: DISCONTINUED | OUTPATIENT
Start: 2021-12-07 | End: 2021-12-08

## 2021-12-07 RX ORDER — NALOXONE HYDROCHLORIDE 0.4 MG/ML
0.2 INJECTION, SOLUTION INTRAMUSCULAR; INTRAVENOUS; SUBCUTANEOUS
Status: DISCONTINUED | OUTPATIENT
Start: 2021-12-07 | End: 2021-12-08

## 2021-12-07 RX ORDER — LIDOCAINE 40 MG/G
CREAM TOPICAL
Status: DISCONTINUED | OUTPATIENT
Start: 2021-12-07 | End: 2021-12-08

## 2021-12-07 RX ORDER — DIPHENHYDRAMINE HCL 25 MG
25 CAPSULE ORAL EVERY 6 HOURS PRN
Status: DISCONTINUED | OUTPATIENT
Start: 2021-12-07 | End: 2021-12-08

## 2021-12-07 RX ORDER — OXYTOCIN/0.9 % SODIUM CHLORIDE 30/500 ML
100-340 PLASTIC BAG, INJECTION (ML) INTRAVENOUS CONTINUOUS PRN
Status: DISCONTINUED | OUTPATIENT
Start: 2021-12-07 | End: 2021-12-08

## 2021-12-07 RX ORDER — ONDANSETRON 4 MG/1
4 TABLET, ORALLY DISINTEGRATING ORAL EVERY 6 HOURS PRN
Status: DISCONTINUED | OUTPATIENT
Start: 2021-12-07 | End: 2021-12-08

## 2021-12-07 RX ORDER — ONDANSETRON 2 MG/ML
4 INJECTION INTRAMUSCULAR; INTRAVENOUS EVERY 6 HOURS PRN
Status: DISCONTINUED | OUTPATIENT
Start: 2021-12-07 | End: 2021-12-07 | Stop reason: HOSPADM

## 2021-12-07 RX ORDER — FENTANYL CITRATE 50 UG/ML
INJECTION, SOLUTION INTRAMUSCULAR; INTRAVENOUS
Status: COMPLETED | OUTPATIENT
Start: 2021-12-07 | End: 2021-12-07

## 2021-12-07 RX ORDER — DIPHENHYDRAMINE HYDROCHLORIDE 50 MG/ML
25 INJECTION INTRAMUSCULAR; INTRAVENOUS EVERY 6 HOURS PRN
Status: DISCONTINUED | OUTPATIENT
Start: 2021-12-07 | End: 2021-12-08

## 2021-12-07 RX ORDER — METOCLOPRAMIDE HYDROCHLORIDE 5 MG/ML
10 INJECTION INTRAMUSCULAR; INTRAVENOUS EVERY 6 HOURS PRN
Status: DISCONTINUED | OUTPATIENT
Start: 2021-12-07 | End: 2021-12-08

## 2021-12-07 RX ORDER — SODIUM CHLORIDE, SODIUM LACTATE, POTASSIUM CHLORIDE, CALCIUM CHLORIDE 600; 310; 30; 20 MG/100ML; MG/100ML; MG/100ML; MG/100ML
INJECTION, SOLUTION INTRAVENOUS CONTINUOUS
Status: DISCONTINUED | OUTPATIENT
Start: 2021-12-07 | End: 2021-12-08

## 2021-12-07 RX ORDER — SODIUM CHLORIDE, SODIUM LACTATE, POTASSIUM CHLORIDE, CALCIUM CHLORIDE 600; 310; 30; 20 MG/100ML; MG/100ML; MG/100ML; MG/100ML
INJECTION, SOLUTION INTRAVENOUS CONTINUOUS PRN
Status: DISCONTINUED | OUTPATIENT
Start: 2021-12-07 | End: 2021-12-08

## 2021-12-07 RX ORDER — METOCLOPRAMIDE 10 MG/1
10 TABLET ORAL EVERY 6 HOURS PRN
Status: DISCONTINUED | OUTPATIENT
Start: 2021-12-07 | End: 2021-12-08

## 2021-12-07 RX ORDER — OXYTOCIN/0.9 % SODIUM CHLORIDE 30/500 ML
1-24 PLASTIC BAG, INJECTION (ML) INTRAVENOUS CONTINUOUS
Status: DISCONTINUED | OUTPATIENT
Start: 2021-12-07 | End: 2021-12-08

## 2021-12-07 RX ORDER — ONDANSETRON 4 MG/1
4 TABLET, ORALLY DISINTEGRATING ORAL EVERY 6 HOURS PRN
Status: DISCONTINUED | OUTPATIENT
Start: 2021-12-07 | End: 2021-12-07 | Stop reason: HOSPADM

## 2021-12-07 RX ORDER — TRANEXAMIC ACID 10 MG/ML
1 INJECTION, SOLUTION INTRAVENOUS EVERY 30 MIN PRN
Status: DISCONTINUED | OUTPATIENT
Start: 2021-12-07 | End: 2021-12-08

## 2021-12-07 RX ORDER — FENTANYL CITRATE 50 UG/ML
50-100 INJECTION, SOLUTION INTRAMUSCULAR; INTRAVENOUS
Status: DISCONTINUED | OUTPATIENT
Start: 2021-12-07 | End: 2021-12-08

## 2021-12-07 RX ORDER — LIDOCAINE HYDROCHLORIDE AND EPINEPHRINE 15; 5 MG/ML; UG/ML
INJECTION, SOLUTION EPIDURAL PRN
Status: DISCONTINUED | OUTPATIENT
Start: 2021-12-07 | End: 2021-12-08

## 2021-12-07 RX ORDER — ROPIVACAINE HYDROCHLORIDE 2 MG/ML
INJECTION, SOLUTION EPIDURAL; INFILTRATION; PERINEURAL
Status: COMPLETED | OUTPATIENT
Start: 2021-12-07 | End: 2021-12-07

## 2021-12-07 RX ORDER — IBUPROFEN 600 MG/1
600 TABLET, FILM COATED ORAL
Status: DISCONTINUED | OUTPATIENT
Start: 2021-12-07 | End: 2021-12-08

## 2021-12-07 RX ORDER — EPHEDRINE SULFATE 50 MG/ML
5 INJECTION, SOLUTION INTRAMUSCULAR; INTRAVENOUS; SUBCUTANEOUS
Status: DISCONTINUED | OUTPATIENT
Start: 2021-12-07 | End: 2021-12-08

## 2021-12-07 RX ORDER — MISOPROSTOL 200 UG/1
800 TABLET ORAL
Status: DISCONTINUED | OUTPATIENT
Start: 2021-12-07 | End: 2021-12-08

## 2021-12-07 RX ORDER — CARBOPROST TROMETHAMINE 250 UG/ML
250 INJECTION, SOLUTION INTRAMUSCULAR
Status: DISCONTINUED | OUTPATIENT
Start: 2021-12-07 | End: 2021-12-08

## 2021-12-07 RX ORDER — METHYLERGONOVINE MALEATE 0.2 MG/ML
200 INJECTION INTRAVENOUS
Status: DISCONTINUED | OUTPATIENT
Start: 2021-12-07 | End: 2021-12-08

## 2021-12-07 RX ORDER — ROPIVACAINE HYDROCHLORIDE 2 MG/ML
10 INJECTION, SOLUTION EPIDURAL; INFILTRATION; PERINEURAL ONCE
Status: DISCONTINUED | OUTPATIENT
Start: 2021-12-07 | End: 2021-12-08

## 2021-12-07 RX ORDER — FENTANYL CITRATE 50 UG/ML
100 INJECTION, SOLUTION INTRAMUSCULAR; INTRAVENOUS ONCE
Status: DISCONTINUED | OUTPATIENT
Start: 2021-12-07 | End: 2021-12-08

## 2021-12-07 RX ORDER — PROCHLORPERAZINE 25 MG
25 SUPPOSITORY, RECTAL RECTAL EVERY 12 HOURS PRN
Status: DISCONTINUED | OUTPATIENT
Start: 2021-12-07 | End: 2021-12-07 | Stop reason: HOSPADM

## 2021-12-07 RX ORDER — PROCHLORPERAZINE MALEATE 5 MG
10 TABLET ORAL EVERY 6 HOURS PRN
Status: DISCONTINUED | OUTPATIENT
Start: 2021-12-07 | End: 2021-12-08

## 2021-12-07 RX ORDER — PROCHLORPERAZINE MALEATE 5 MG
10 TABLET ORAL EVERY 6 HOURS PRN
Status: DISCONTINUED | OUTPATIENT
Start: 2021-12-07 | End: 2021-12-07 | Stop reason: HOSPADM

## 2021-12-07 RX ORDER — MISOPROSTOL 200 UG/1
400 TABLET ORAL
Status: DISCONTINUED | OUTPATIENT
Start: 2021-12-07 | End: 2021-12-08

## 2021-12-07 RX ADMIN — LIDOCAINE HYDROCHLORIDE AND EPINEPHRINE 3 ML: 15; 5 INJECTION, SOLUTION EPIDURAL at 21:34

## 2021-12-07 RX ADMIN — FENTANYL CITRATE 100 MCG: 50 INJECTION, SOLUTION INTRAMUSCULAR; INTRAVENOUS at 21:37

## 2021-12-07 RX ADMIN — SODIUM CHLORIDE, POTASSIUM CHLORIDE, SODIUM LACTATE AND CALCIUM CHLORIDE: 600; 310; 30; 20 INJECTION, SOLUTION INTRAVENOUS at 21:44

## 2021-12-07 RX ADMIN — Medication: at 21:40

## 2021-12-07 RX ADMIN — SODIUM CHLORIDE, POTASSIUM CHLORIDE, SODIUM LACTATE AND CALCIUM CHLORIDE 1000 ML: 600; 310; 30; 20 INJECTION, SOLUTION INTRAVENOUS at 20:44

## 2021-12-07 RX ADMIN — ROPIVACAINE HYDROCHLORIDE 8 ML: 2 INJECTION, SOLUTION EPIDURAL; INFILTRATION at 21:37

## 2021-12-07 ASSESSMENT — ACTIVITIES OF DAILY LIVING (ADL)
WEAR_GLASSES_OR_BLIND: YES
DIFFICULTY_EATING/SWALLOWING: NO
DIFFICULTY_COMMUNICATING: NO
DRESSING/BATHING_DIFFICULTY: NO
DOING_ERRANDS_INDEPENDENTLY_DIFFICULTY: NO
CONCENTRATING,_REMEMBERING_OR_MAKING_DECISIONS_DIFFICULTY: NO
TOILETING_ISSUES: NO
FALL_HISTORY_WITHIN_LAST_SIX_MONTHS: NO
WALKING_OR_CLIMBING_STAIRS_DIFFICULTY: NO

## 2021-12-08 LAB — T PALLIDUM AB SER QL: NONREACTIVE

## 2021-12-08 PROCEDURE — 120N000012 HC R&B POSTPARTUM

## 2021-12-08 PROCEDURE — 59400 OBSTETRICAL CARE: CPT | Performed by: OBSTETRICS & GYNECOLOGY

## 2021-12-08 PROCEDURE — 722N000001 HC LABOR CARE VAGINAL DELIVERY SINGLE

## 2021-12-08 PROCEDURE — 250N000013 HC RX MED GY IP 250 OP 250 PS 637: Performed by: OBSTETRICS & GYNECOLOGY

## 2021-12-08 PROCEDURE — 250N000009 HC RX 250: Performed by: OBSTETRICS & GYNECOLOGY

## 2021-12-08 RX ORDER — MISOPROSTOL 200 UG/1
400 TABLET ORAL
Status: DISCONTINUED | OUTPATIENT
Start: 2021-12-08 | End: 2021-12-09 | Stop reason: HOSPADM

## 2021-12-08 RX ORDER — BISACODYL 10 MG
10 SUPPOSITORY, RECTAL RECTAL DAILY PRN
Status: DISCONTINUED | OUTPATIENT
Start: 2021-12-08 | End: 2021-12-09 | Stop reason: HOSPADM

## 2021-12-08 RX ORDER — HYDROCORTISONE 2.5 %
CREAM (GRAM) TOPICAL 3 TIMES DAILY PRN
Status: DISCONTINUED | OUTPATIENT
Start: 2021-12-08 | End: 2021-12-09 | Stop reason: HOSPADM

## 2021-12-08 RX ORDER — IBUPROFEN 400 MG/1
800 TABLET, FILM COATED ORAL EVERY 6 HOURS PRN
Status: DISCONTINUED | OUTPATIENT
Start: 2021-12-08 | End: 2021-12-09 | Stop reason: HOSPADM

## 2021-12-08 RX ORDER — MODIFIED LANOLIN
OINTMENT (GRAM) TOPICAL
Status: DISCONTINUED | OUTPATIENT
Start: 2021-12-08 | End: 2021-12-09 | Stop reason: HOSPADM

## 2021-12-08 RX ORDER — TRANEXAMIC ACID 10 MG/ML
1 INJECTION, SOLUTION INTRAVENOUS EVERY 30 MIN PRN
Status: DISCONTINUED | OUTPATIENT
Start: 2021-12-08 | End: 2021-12-09 | Stop reason: HOSPADM

## 2021-12-08 RX ORDER — DOCUSATE SODIUM 100 MG/1
100 CAPSULE, LIQUID FILLED ORAL DAILY
Status: DISCONTINUED | OUTPATIENT
Start: 2021-12-08 | End: 2021-12-09 | Stop reason: HOSPADM

## 2021-12-08 RX ORDER — MISOPROSTOL 200 UG/1
800 TABLET ORAL
Status: DISCONTINUED | OUTPATIENT
Start: 2021-12-08 | End: 2021-12-09 | Stop reason: HOSPADM

## 2021-12-08 RX ORDER — OXYTOCIN 10 [USP'U]/ML
10 INJECTION, SOLUTION INTRAMUSCULAR; INTRAVENOUS
Status: DISCONTINUED | OUTPATIENT
Start: 2021-12-08 | End: 2021-12-09 | Stop reason: HOSPADM

## 2021-12-08 RX ORDER — CARBOPROST TROMETHAMINE 250 UG/ML
250 INJECTION, SOLUTION INTRAMUSCULAR
Status: DISCONTINUED | OUTPATIENT
Start: 2021-12-08 | End: 2021-12-09 | Stop reason: HOSPADM

## 2021-12-08 RX ORDER — METHYLERGONOVINE MALEATE 0.2 MG/ML
200 INJECTION INTRAVENOUS
Status: DISCONTINUED | OUTPATIENT
Start: 2021-12-08 | End: 2021-12-09 | Stop reason: HOSPADM

## 2021-12-08 RX ORDER — ACETAMINOPHEN 325 MG/1
650 TABLET ORAL EVERY 4 HOURS PRN
Status: DISCONTINUED | OUTPATIENT
Start: 2021-12-08 | End: 2021-12-09 | Stop reason: HOSPADM

## 2021-12-08 RX ORDER — OXYTOCIN/0.9 % SODIUM CHLORIDE 30/500 ML
340 PLASTIC BAG, INJECTION (ML) INTRAVENOUS CONTINUOUS PRN
Status: DISCONTINUED | OUTPATIENT
Start: 2021-12-08 | End: 2021-12-09 | Stop reason: HOSPADM

## 2021-12-08 RX ADMIN — IBUPROFEN 800 MG: 400 TABLET, FILM COATED ORAL at 17:36

## 2021-12-08 RX ADMIN — ACETAMINOPHEN 650 MG: 325 TABLET, FILM COATED ORAL at 17:36

## 2021-12-08 RX ADMIN — IBUPROFEN 800 MG: 400 TABLET, FILM COATED ORAL at 05:32

## 2021-12-08 RX ADMIN — DOCUSATE SODIUM 100 MG: 100 CAPSULE, LIQUID FILLED ORAL at 09:30

## 2021-12-08 RX ADMIN — Medication 340 ML/HR: at 03:56

## 2021-12-08 ASSESSMENT — ACTIVITIES OF DAILY LIVING (ADL)
ADLS_ACUITY_SCORE: 4

## 2021-12-08 NOTE — PROVIDER NOTIFICATION
12/08/21 0205   Provider Notification   Provider Name/Title Dr Benson   Method of Notification Phone   Request Evaluate - Remote   Notification Reason Status Update;Labor Status;SVE   MD phoned in for update; wants RN to check cervix and will come to bedside in 5min

## 2021-12-08 NOTE — H&P
No significant change in general health status based on examination of the patient, review of Nursing Admission Database and prenatal record.  32yo  admitted at 37+5wks with SROM.  Pregnancy uncomplicated.  Cervix 3/90/0 per RN.  Expectant management.  Epidural if desired.    EFW: 6lb 8oz     Reva Benson MD

## 2021-12-08 NOTE — PROVIDER NOTIFICATION
12/08/21 0113   Provider Notification   Provider Name/Title Dr. Benson   Method of Notification Phone   Request Evaluate - Remote   Notification Reason Status Update;Labor Status;SVE   MD phoned in for update; coming to hospital to evaluate another patient; will call to check on patient when she is done; RN updated MD on cervical check

## 2021-12-08 NOTE — PLAN OF CARE
Data: Patient admitted to room 220 at 1830. Patient is a . Prenatal record reviewed.   OB History    Para Term  AB Living   1 0 0 0 0 0   SAB IAB Ectopic Multiple Live Births   0 0 0 0 0      # Outcome Date GA Lbr Jovi/2nd Weight Sex Delivery Anes PTL Lv   1 Current            .  Medical History: History reviewed. No pertinent past medical history..  Gestational age 37w5d. Vital signs per doc flowsheet. Fetal movement present. Patient reports Spontaneous Rupture of Membrane   as reason for admission. Support persons Ahmed present.  Action: Report from Amaya ABREU RN obtained at 1815. Care of patient assumed at 1830. Verbal consent for EFM, external fetal monitors applied. Admission assessment completed. Patient and support persons educated on labor process. Patient instructed to report change in fetal movement, contractions, vaginal leaking of fluid or bleeding, abdominal pain, or any concerns related to the pregnancy to her nurse/physician. Patient oriented to room, call light in reach.   Response: Dr. Benson informed of pt arrival by Amaya ABREU RN. Plan per provider is allow pt to labor naturally and recheck cervix at 2100 and report. Patient verbalized understanding of education and verbalized agreement with plan. Patient coping with labor via support person, explanation / emotional support.

## 2021-12-08 NOTE — PROVIDER NOTIFICATION
12/07/21 2050   Provider Notification   Provider Name/Title Dr. Benson   Method of Notification Electronic Page   Request Evaluate - Remote   Notification Reason Status Update;Labor Status   Pt is requesting an epidural; ctx regular and every 2-4min; will check cervix after epidural

## 2021-12-08 NOTE — L&D DELIVERY NOTE
Delivery Date: 12/08/2021    Melba is a 31-year-old G1, P0 who presented to Labor and Delivery at 37+5 weeks' gestation with spontaneous rupture of membranes.  Melba's pregnancy was overall uncomplicated and followed by Dr. Bowden.  Prenatal laboratories were within normal limits including blood type A positive, rubella status immune, Glucola within normal limits and GBS negative.  Melba received pertussis vaccine during this pregnancy, but did not receive influenza vaccine.  Estimated fetal weight is 6 and a half pounds.    On arrival to Labor and Delivery, Melba was confirmed to be spontaneously ruptured and was 3 cm dilated, 90% effaced and 0 station.  She was betty on occasion and was admitted and expectantly managed.  Contractions became more intense and Melba received an epidural for pain control, after which she was found to be 4 cm dilated, completely effaced and 0 station.  Fetal heart tones remained reactive throughout the labor process with baseline in the 130s with moderate variability and accelerations present.  Melba made excellent progress overnight and became complete at approximately 2:00 a.m.  With maternal pressure, she did begin pushing immediately.  With excellent maternal effort, Melba delivered a female infant in the left occiput anterior position over a small midline episiotomy.   Decision was made to cut episiotomy due to a very tight band at the introitus in attempts to prevent significant anterior tearing.  Remainder of infant was delivered and placed on maternal abdomen.  Of note, there was one body cord and the infant was delivered through this.  Placenta was delivered spontaneously and intact.  Fundus was firm and bleeding well controlled.  Midline episiotomy was repaired in the usual fashion using a 3-0 Vicryl suture with excellent reapproximation.  Both mother and infant were doing very well following delivery and are stable for recovery.    DELIVERY TIMES:   FIRST STAGE OF  LABOR:  5 hours and 13 minutes.    SECOND STAGE OF LABOR:  1 hour and 31 minutes.   THIRD STAGE OF LABOR:  12 minutes.    FINAL DIAGNOSES:     1.  Spontaneous vaginal delivery of 37+6 week female infant with a weight not yet recorded and Apgars of 8 and 9.  2.  Epidural for pain control.  3.  Midline episiotomy.    Reva Benson MD        D: 2021   T: 2021   MT: KELLEN    Name:     CARON NEVAREZ  MRN:      [id number]        Account:       [id number]   :      1990           Delivery Date: 2021     Document: [id number]

## 2021-12-08 NOTE — PLAN OF CARE
Report received from IGOR Lawler; patient resting comfortably in bed at this time; RN discussed plan of care with patient and reviewed prenatal history with patient. Patient plans to go unmedicated at this time. Will plan to recheck cervix at 2100 and update MD at that time.

## 2021-12-08 NOTE — PROVIDER NOTIFICATION
12/07/21 2211   Provider Notification   Provider Name/Title Dr. Guerrero   Method of Notification Electronic Page   Request Evaluate - Remote   Notification Reason Status Update   MD phoned in, RN updated on pt status, ctx pattern, fht; orders to keep going without pitocin; may start pitocin at anytime if needed

## 2021-12-08 NOTE — PLAN OF CARE
Data: [facility]   Reason for maternal/fetal assessment per patient is SROM  Patient is a . Prenatal record reviewed.   Gestational Age 37+5. VSS. Fetal movement present. Patient denies backache, vaginal discharge, pelvic pressure, UTI symptoms, GI problems, bloody show, vaginal bleeding, edema, headache, visual disturbances, epigastric or URQ pain, abdominal pain. Support persons  present.  Pt reports feeling fluid leaking today around 1450, clear fluid.  Action: Verbal consent for EFM. Triage assessment completed. EFM applied for fetal well being with SROM. Uterine assessment infrequent moderate ctxs. Fetal assessment: Presumed adequate fetal oxygenation documented (see flow record). ROMplus collected and sent to lab.  -Dr. Benson informed of positive romplus, fetal tracing, uterine activity and cervical exam. Plan per provider is admit to labor, recheck cervix at 2130 if unchanged start pitocin. Patient verbalized agreement with plan. Patient transferred to room 220 ambulatory, oriented to room and call light. Report given to KRYSTLE Greene RN.

## 2021-12-08 NOTE — L&D DELIVERY NOTE
of viable female at 0344   Baby's weight not yet recorded     Apgars 8, 9     Placenta delivered spontaneously and intact   Body cord; delivered through    Small midline episiotomy cut due to very tight band at introitus and to prevent anterior tearing      Primary OB: Cremer

## 2021-12-08 NOTE — PLAN OF CARE
37.6wk SROM   at 0344 with small episiotomy  Postpartum pitocin started after delivery of placenta   at delivery

## 2021-12-08 NOTE — PLAN OF CARE
After obtaining verbal consent from patient, left sided nasopharyngeal swab for covid 19 test performed. Labeled with patient label, verified by patient. Double bagged and sent in tube to lab.

## 2021-12-08 NOTE — PLAN OF CARE
Data: Melba Galvan transferred to [facility] via wheelchair at 0620. Baby transferred via crib.  Action: Receiving unit notified of transfer: Yes. Patient and family notified of room change. Report given to Coty  at 0625. Belongings sent to receiving unit. Accompanied by Registered Nurse. Oriented patient to surroundings. Call light within reach. ID bands double-checked with receiving RN.  Response: Patient tolerated transfer and is stable.

## 2021-12-08 NOTE — PLAN OF CARE
Pt. admitted from L&D  via wheelchair.  Pt. arrived with baby and was accompanied by  and arrived with personal belongings. Report was taken from Radha Day RN in L&D. Fundus is firm and slightly to the right (which has been baseline)  Light amount of vaginal bleeding.  Pitocin infusing. Pt. oriented to the room and call light system.

## 2021-12-08 NOTE — ANESTHESIA PROCEDURE NOTES
Epidural catheter Procedure Note    Pre-Procedure   Staff -        Anesthesiologist:  Aliyah Barcenas MD       Performed By: anesthesiologist       Location: OB       Pre-Anesthestic Checklist: patient identified, IV checked, risks and benefits discussed, informed consent, monitors and equipment checked, pre-op evaluation and at physician/surgeon's request  Timeout:       Correct Patient: Yes        Correct Procedure: Yes        Correct Site: Yes        Correct Position: Yes   Procedure Documentation  Procedure: epidural catheter       Diagnosis: Labor Pain       Patient Position: sitting       Patient Prep/Sterile Barriers: sterile gloves, mask, patient draped, x3       Skin prep: Betadine      Local skin infiltrated with mL of 1% lidocaine.        Insertion Site: L3-4. (midline approach).       Technique: LORT saline        PINO at 5 cm.       Needle Type: orderTalky needle       Needle Gauge: 17.        Needle Length (Inches): 5        Catheter: 19 G.         Catheter threaded easily.         4 cm epidural space.         Threaded 8 cm at skin.        # of attempts: 1 and  # of redirects:     Assessment/Narrative         Paresthesias: No.      Test dose of 3 mL lidocaine 1.5% w/ 1:200,000 epinephrine at.         Test dose negative, 3 minutes after injection, for signs of intravascular, subdural, or intrathecal injection.       Insertion/Infusion Method: LORT saline       Aspiration negative for Heme or CSF via Epidural Catheter.    Medication(s) Administered   0.2% Ropivacaine (Epidural), 8 mL  Fentanyl PF (Epidural), 100 mcg  Medication Administration Time: 12/7/2021 9:37 PM     Comments:  Catheter secured with adhesive spray, tegaderm, and tape.

## 2021-12-08 NOTE — PROVIDER NOTIFICATION
12/07/21 2207   Provider Notification   Provider Name/Title Dr Benson   Method of Notification Electronic Page   Request Evaluate - Remote   Notification Reason SVE;Status Update;Pain;Labor Status   pt D.S. in room 220 comfy with epidural; cervical exam 4/100/0, carbajal cath in place

## 2021-12-08 NOTE — ANESTHESIA PREPROCEDURE EVALUATION
Anesthesia Pre-Procedure Evaluation    Patient: Melba Galvan   MRN: [id number] : 1990        Preoperative Diagnosis: * No pre-op diagnosis entered *    Procedure : * No procedures listed *          History reviewed. No pertinent past medical history.   Past Surgical History:   Procedure Laterality Date     NO HISTORY OF SURGERY        No Known Allergies   Social History     Tobacco Use     Smoking status: Former Smoker     Packs/day: 0.50     Years: 10.00     Pack years: 5.00     Types: Hookah     Start date: 2010     Quit date: 2020     Years since quittin.2     Smokeless tobacco: Never Used   Substance Use Topics     Alcohol use: Never      Wt Readings from Last 1 Encounters:   21 78.9 kg (174 lb)        Anesthesia Evaluation            ROS/MED HX  ENT/Pulmonary:    (-) asthma   Neurologic:  - neg neurologic ROS     Cardiovascular:    (-) PIH   METS/Exercise Tolerance:     Hematologic:     (+) no anticoagulation therapy, no coagulopathy,     Musculoskeletal:       GI/Hepatic:     (+) GERD,     Renal/Genitourinary:       Endo:       Psychiatric/Substance Use:       Infectious Disease:       Malignancy:       Other:            Physical Exam    Airway        Mallampati: II   TM distance: > 3 FB   Neck ROM: full     Respiratory Devices and Support         Dental  no notable dental history         Cardiovascular   cardiovascular exam normal          Pulmonary   pulmonary exam normal                OUTSIDE LABS:  CBC:   Lab Results   Component Value Date    WBC 10.1 2021    WBC 8.1 2021    HGB 11.9 2021    HGB 11.1 (L) 2021    HCT 35.7 2021    HCT 32.7 (L) 2021     2021     2021     BMP:   Lab Results   Component Value Date     2021    POTASSIUM 3.5 2021    CHLORIDE 105 2021    CO2 28 2021    BUN 8 2021    CR 0.57 2021    GLC 82 2021     COAGS: No results found for: PTT, INR,  FIBR  POC:   Lab Results   Component Value Date    HCG Positive (A) 04/19/2021     HEPATIC:   Lab Results   Component Value Date    ALBUMIN 2.7 (L) 09/13/2021    PROTTOTAL 6.5 (L) 09/13/2021    ALT 24 09/13/2021    AST 17 09/13/2021    ALKPHOS 65 09/13/2021    BILITOTAL 0.3 09/13/2021     OTHER:   Lab Results   Component Value Date    BARB 8.5 09/13/2021       Anesthesia Plan    ASA Status:  2      Anesthesia Type: Epidural.              Consents    Anesthesia Plan(s) and associated risks, benefits, and realistic alternatives discussed. Questions answered and patient/representative(s) expressed understanding.    - Discussed:     - Discussed with:  Patient         Postoperative Care            Comments:    Other Comments: Orders to manage the epidural infusion have been entered, and through coordination with the nurse, we will continute to manage and monitor the patient's labor epidural.  We will continuously be available to adjust as needed thruout the entire L&D process.             Aliyah Barcenas MD

## 2021-12-09 VITALS
HEART RATE: 98 BPM | TEMPERATURE: 98.1 F | OXYGEN SATURATION: 98 % | SYSTOLIC BLOOD PRESSURE: 101 MMHG | DIASTOLIC BLOOD PRESSURE: 66 MMHG | RESPIRATION RATE: 18 BRPM

## 2021-12-09 LAB — HGB BLD-MCNC: 9.6 G/DL (ref 11.7–15.7)

## 2021-12-09 PROCEDURE — 90686 IIV4 VACC NO PRSV 0.5 ML IM: CPT | Performed by: OBSTETRICS & GYNECOLOGY

## 2021-12-09 PROCEDURE — 250N000013 HC RX MED GY IP 250 OP 250 PS 637: Performed by: OBSTETRICS & GYNECOLOGY

## 2021-12-09 PROCEDURE — 85018 HEMOGLOBIN: CPT | Performed by: OBSTETRICS & GYNECOLOGY

## 2021-12-09 PROCEDURE — G0008 ADMIN INFLUENZA VIRUS VAC: HCPCS | Performed by: OBSTETRICS & GYNECOLOGY

## 2021-12-09 PROCEDURE — 36415 COLL VENOUS BLD VENIPUNCTURE: CPT | Performed by: OBSTETRICS & GYNECOLOGY

## 2021-12-09 PROCEDURE — 250N000011 HC RX IP 250 OP 636: Performed by: OBSTETRICS & GYNECOLOGY

## 2021-12-09 RX ORDER — ACETAMINOPHEN 325 MG/1
325 TABLET ORAL EVERY 4 HOURS PRN
Qty: 30 TABLET | Refills: 0 | Status: SHIPPED | OUTPATIENT
Start: 2021-12-09 | End: 2022-01-12

## 2021-12-09 RX ORDER — IBUPROFEN 800 MG/1
800 TABLET, FILM COATED ORAL EVERY 6 HOURS PRN
Qty: 30 TABLET | Refills: 0 | Status: SHIPPED | OUTPATIENT
Start: 2021-12-09 | End: 2022-01-12

## 2021-12-09 RX ADMIN — DOCUSATE SODIUM 100 MG: 100 CAPSULE, LIQUID FILLED ORAL at 08:33

## 2021-12-09 RX ADMIN — ACETAMINOPHEN 650 MG: 325 TABLET, FILM COATED ORAL at 06:30

## 2021-12-09 RX ADMIN — ACETAMINOPHEN 650 MG: 325 TABLET, FILM COATED ORAL at 14:16

## 2021-12-09 RX ADMIN — INFLUENZA A VIRUS A/VICTORIA/2570/2019 IVR-215 (H1N1) ANTIGEN (FORMALDEHYDE INACTIVATED), INFLUENZA A VIRUS A/TASMANIA/503/2020 IVR-221 (H3N2) ANTIGEN (FORMALDEHYDE INACTIVATED), INFLUENZA B VIRUS B/PHUKET/3073/2013 ANTIGEN (FORMALDEHYDE INACTIVATED), AND INFLUENZA B VIRUS B/WASHINGTON/02/2019 ANTIGEN (FORMALDEHYDE INACTIVATED) 0.5 ML: 15; 15; 15; 15 INJECTION, SUSPENSION INTRAMUSCULAR at 11:25

## 2021-12-09 RX ADMIN — ACETAMINOPHEN 650 MG: 325 TABLET, FILM COATED ORAL at 00:13

## 2021-12-09 RX ADMIN — IBUPROFEN 800 MG: 400 TABLET, FILM COATED ORAL at 00:14

## 2021-12-09 RX ADMIN — IBUPROFEN 800 MG: 400 TABLET, FILM COATED ORAL at 14:16

## 2021-12-09 RX ADMIN — IBUPROFEN 800 MG: 400 TABLET, FILM COATED ORAL at 06:31

## 2021-12-09 ASSESSMENT — ACTIVITIES OF DAILY LIVING (ADL)
ADLS_ACUITY_SCORE: 4

## 2021-12-09 NOTE — PROGRESS NOTES
S: Pt doing well. Infant is being . Pain is well controlled.    O: /75   Pulse 95   Temp 98  F (36.7  C) (Oral)   Resp 16   LMP 03/18/2021   SpO2 98%   Breastfeeding Unknown         ABD:  Uterine fundus is firm, non-tender and at the level of the umbilicus                Hemoglobin   Date Value Ref Range Status   12/07/2021 11.9 11.7 - 15.7 g/dL Final   04/28/2021 12.8 11.7 - 15.7 g/dL Final            Lab Results   Component Value Date    RH Pos 04/28/2021       A:  Post-partum day #1 s/p Normal spontaneous vaginal delivery    P: Continue PP Cares      Discharge later this afternoon to home      hgb 9.6   No symptoms    Does not require treatment, not clinically significant   Blood loss anemia from delivery  Admit hgb was 11.9

## 2021-12-09 NOTE — PLAN OF CARE
Vital signs stable. Postpartum assessment WDL. Pain controlled with prn meds. Patient voiding without difficulty. Breastfeeding on cue impendent with feeds. Patient and infant bonding well. Mother asking appropriate questions and interactive with cares. Will continue with current plan of care.

## 2021-12-09 NOTE — LACTATION NOTE
Lactation visit with Melba and baby girl. Melba had been holding infant skin to skin for the last 10 minutes offering to feed her. Infant now beginning to cue, helped Melba position infant in cross cradle. Melba appears very comfortable with how she holding/positioning infant. Infant latches easily and displays nutritive suckling pattern.        Reviewed  breastfeeding basics:   1) Watch for early feeding cues (licking lips, stirring or rooting, sucking movement with mouth, hands to mouth) and always breast feed on DEMAND.  2) Infant should breastfeed a minimum of 8 times in 24 hours. If it has been 3 hours since last breast feeding session, un-swaddle infant and begin skin to skin to entice infant to nurse.  3) Techniques to waking a sleepy baby to nurse: (undress infant, change diaper if necessary, gently stroking bottom of feet and back, snuggling infant skin to skin, expressing colostrum).   Suggested Dr. Caitlin Booth's web site ([facility])  for additional education and videos on breastfeeding and the benefits of early hand expression.    Reviewed [place] outpatient lactation resources. Appreciative of visit.    Renee Kong RN, IBCLC

## 2021-12-09 NOTE — PLAN OF CARE
Vital signs stable. Postpartum assessment WDL. Pain controlled with tylenol and ibuprofen. Patient voiding without difficulty. Breastfeeding on cue with assist. Patient and infant bonding well. Needs teaching and encouragement with infant cares. Will continue with current plan of care.

## 2021-12-09 NOTE — PLAN OF CARE
Patient doing well overall, having complaints of mild perineal discomfort.  Using tucks for comfort.  Taking ibuprofen and tylenol when available.  Fundus firm below umbilicus, lochia scant, no clots.  Independent with breast feeding.  Encouraged to call with questions/concerns.  Will continue to monitor.

## 2021-12-10 ENCOUNTER — MEDICAL CORRESPONDENCE (OUTPATIENT)
Dept: HEALTH INFORMATION MANAGEMENT | Facility: CLINIC | Age: 31
End: 2021-12-10
Payer: COMMERCIAL

## 2022-01-10 NOTE — PROGRESS NOTES
SUBJECTIVE:    Melba Galvan,  is here for a postpartum visit.  She had a  on 21 delivering a healthy baby girl, named Stephie weighing 7 lbs 4 oz at term.      HPI:  Vaginal delivery   Breast feeding going well  Bottom still a little sore from 2nd degree epis    Last PHQ-9 score on record=   PHQ-9 SCORE 2022   PHQ-9 Total Score MyChart -   PHQ-9 Total Score 1     BAL-7 SCORE 2021   Total Score 6 (mild anxiety) -   Total Score 6 0     Alcohol: 0    Pap:   Lab Results   Component Value Date    PAP NIL 2021      Last HGB:  Hemoglobin   Date Value Ref Range Status   2021 9.6 (L) 11.7 - 15.7 g/dL Final   2021 12.8 11.7 - 15.7 g/dL Final     Delivery complications:  No  Breast feeding:  Yes  Bladder problems:  No  Bowel problems/hemorrhoids:  No  Episiotomy/laceration/incision healed? Yes  Vaginal flow:  None  [place]:  No  Contraception: none  Emotional adjustment:  doing well and happy  Back to work:  Works from home, already back to work     12 point review of systems negative other than symptoms noted below or in the HPI.    OBJECTIVE:  Vitals: BP 98/56 (BP Location: Right arm, Patient Position: Sitting, Cuff Size: Adult Small)   Wt 69.1 kg (152 lb 6.4 oz)   LMP 2021   Breastfeeding Yes   BMI 23.87 kg/m    BMI= Body mass index is 23.87 kg/m .  General - pleasant female in no acute distress.  Breast -  deferred  Abdomen - No incision  Pelvic - EG: normal adult female, BUS: within normal limits, Vagina: well rugated, no discharge, Cervix: no lesions or CMT, Uterus: firm, normal sized and nontender, midplane in position. Adnexae: no masses or tenderness.  Rectovaginal - deferred.    ASSESSMENT:    ICD-10-CM    1. Encounter for postpartum visit  Z39.2        PLAN:  May resume normal activities without restrictions.  Pap smear was not done today.    Full counseling was provided, and all questions were answered.   Return to clinic in one year for an  annual visit.   Declines contraception    There are no Patient Instructions on file for this visit.  Nel Bowden MD

## 2022-01-12 ENCOUNTER — PRENATAL OFFICE VISIT (OUTPATIENT)
Dept: OBGYN | Facility: CLINIC | Age: 32
End: 2022-01-12
Payer: COMMERCIAL

## 2022-01-12 VITALS — SYSTOLIC BLOOD PRESSURE: 98 MMHG | BODY MASS INDEX: 23.87 KG/M2 | WEIGHT: 152.4 LBS | DIASTOLIC BLOOD PRESSURE: 56 MMHG

## 2022-01-12 PROCEDURE — 99207 PR POST PARTUM EXAM: CPT | Performed by: OBSTETRICS & GYNECOLOGY

## 2022-01-12 ASSESSMENT — PATIENT HEALTH QUESTIONNAIRE - PHQ9
SUM OF ALL RESPONSES TO PHQ QUESTIONS 1-9: 1
5. POOR APPETITE OR OVEREATING: NOT AT ALL

## 2022-01-12 ASSESSMENT — ANXIETY QUESTIONNAIRES
GAD7 TOTAL SCORE: 0
2. NOT BEING ABLE TO STOP OR CONTROL WORRYING: NOT AT ALL
IF YOU CHECKED OFF ANY PROBLEMS ON THIS QUESTIONNAIRE, HOW DIFFICULT HAVE THESE PROBLEMS MADE IT FOR YOU TO DO YOUR WORK, TAKE CARE OF THINGS AT HOME, OR GET ALONG WITH OTHER PEOPLE: NOT DIFFICULT AT ALL
7. FEELING AFRAID AS IF SOMETHING AWFUL MIGHT HAPPEN: NOT AT ALL
1. FEELING NERVOUS, ANXIOUS, OR ON EDGE: NOT AT ALL
3. WORRYING TOO MUCH ABOUT DIFFERENT THINGS: NOT AT ALL
6. BECOMING EASILY ANNOYED OR IRRITABLE: NOT AT ALL
5. BEING SO RESTLESS THAT IT IS HARD TO SIT STILL: NOT AT ALL

## 2022-01-13 ASSESSMENT — ANXIETY QUESTIONNAIRES: GAD7 TOTAL SCORE: 0

## 2022-07-11 ENCOUNTER — OFFICE VISIT (OUTPATIENT)
Dept: OBGYN | Facility: CLINIC | Age: 32
End: 2022-07-11
Payer: COMMERCIAL

## 2022-07-11 VITALS
DIASTOLIC BLOOD PRESSURE: 58 MMHG | WEIGHT: 154 LBS | BODY MASS INDEX: 24.17 KG/M2 | HEIGHT: 67 IN | SYSTOLIC BLOOD PRESSURE: 92 MMHG

## 2022-07-11 DIAGNOSIS — D62 ANEMIA DUE TO ACUTE BLOOD LOSS: ICD-10-CM

## 2022-07-11 DIAGNOSIS — Z01.419 WOMEN'S ANNUAL ROUTINE GYNECOLOGICAL EXAMINATION: Primary | ICD-10-CM

## 2022-07-11 LAB
ERYTHROCYTE [DISTWIDTH] IN BLOOD BY AUTOMATED COUNT: 13.2 % (ref 10–15)
HCT VFR BLD AUTO: 38.9 % (ref 35–47)
HGB BLD-MCNC: 12.9 G/DL (ref 11.7–15.7)
MCH RBC QN AUTO: 28.3 PG (ref 26.5–33)
MCHC RBC AUTO-ENTMCNC: 33.2 G/DL (ref 31.5–36.5)
MCV RBC AUTO: 85 FL (ref 78–100)
PLATELET # BLD AUTO: 239 10E3/UL (ref 150–450)
RBC # BLD AUTO: 4.56 10E6/UL (ref 3.8–5.2)
WBC # BLD AUTO: 6.2 10E3/UL (ref 4–11)

## 2022-07-11 PROCEDURE — 99395 PREV VISIT EST AGE 18-39: CPT | Performed by: STUDENT IN AN ORGANIZED HEALTH CARE EDUCATION/TRAINING PROGRAM

## 2022-07-11 PROCEDURE — 36415 COLL VENOUS BLD VENIPUNCTURE: CPT | Performed by: STUDENT IN AN ORGANIZED HEALTH CARE EDUCATION/TRAINING PROGRAM

## 2022-07-11 PROCEDURE — 85027 COMPLETE CBC AUTOMATED: CPT | Performed by: STUDENT IN AN ORGANIZED HEALTH CARE EDUCATION/TRAINING PROGRAM

## 2022-07-11 NOTE — PROGRESS NOTES
"Caron is a 31 year old  female who presents for annual exam.     Besides routine health maintenance, {NO OTHER:314021}.    HPI:    ***      GYNECOLOGIC HISTORY:    No LMP recorded.  Regular menses? { :470509}  Menses every *** days.  Length of menses: {NUMBERS 1-16:10} days  Her current contraception method is: none.  She  reports that she quit smoking about 22 months ago. Her smoking use included hookah. She started smoking about 11 years ago. She has a 5.00 pack-year smoking history. She has never used smokeless tobacco.  Patient is sexually active.  STD testing offered?  Declined     Last PHQ-9 score on record =   PHQ-9 SCORE 2022   PHQ-9 Total Score MyChart -   PHQ-9 Total Score 1     Last GAD7 score on record =   BAL-7 SCORE 2022   Total Score -   Total Score 0     Alcohol Score = ***    HEALTH MAINTENANCE:  Cholesterol: (No results found for: CHOL   Last Mammo: N/A, Result: not applicable, Next Mammo: screen age 38  Pap:   Lab Results   Component Value Date    PAP NIL, NEG-HPV 2021   Colonoscopy:  ***, Result: {plc normal:900817::\"Normal\"}, Next Colonoscopy: *** years.  Dexa:  ***    Health maintenance updated:  {yes no:548685}    HISTORY:  OB History    Para Term  AB Living   1 1 1 0 0 1   SAB IAB Ectopic Multiple Live Births   0 0 0 0 1      # Outcome Date GA Lbr Jovi/2nd Weight Sex Delivery Anes PTL Lv   1 Term 21 37w6d 05:13 / 01:31 3.289 kg (7 lb 4 oz) F Vag-Spont EPI N VERONICA      Name: MÓNICA NEVAREZ-CARON      Apgar1: 8  Apgar5: 9       Patient Active Problem List   Diagnosis     Screening for cervical cancer     Supervision of normal IUP (intrauterine pregnancy) in primigravida     Encounter for triage in pregnant patient     Indication for care in labor or delivery     Past Surgical History:   Procedure Laterality Date     NO HISTORY OF SURGERY        Social History     Tobacco Use     Smoking status: Former Smoker     Packs/day: 0.50     Years: 10.00     Pack " "years: 5.00     Types: Hookah     Start date: 2010     Quit date: 2020     Years since quittin.8     Smokeless tobacco: Never Used   Substance Use Topics     Alcohol use: Never           Current Outpatient Medications   Medication Sig     Misc. Devices (BREAST PUMP) MISC 1 each daily     Prenatal w/o A Vit-Fe Fum-FA (PRENATA PO) Take 1 tablet by mouth daily      No current facility-administered medications for this visit.     No Known Allergies    Past medical, surgical, social and family histories were reviewed and updated in EPIC.    ROS:   {[facility] ROSGYN:819118::\"12 point review of systems negative other than symptoms noted below or in the HPI.\"}  {ROS - :318638::\"No urinary frequency or dysuria, bladder or kidney problems\"}    EXAM:  There were no vitals taken for this visit.   BMI: There is no height or weight on file to calculate BMI.    PHYSICAL EXAM:  Constitutional:   Appearance: Well nourished, well developed, alert, in no acute distress  Neck:  Lymph Nodes:  No lymphadenopathy present    Thyroid:  Gland size normal, nontender, no nodules or masses present  on palpation  Chest:  Respiratory Effort:  Breathing unlabored  Cardiovascular:    Heart: Auscultation:  Regular rate, normal rhythm, no murmurs present  Breasts: {[facility] Breast exam:615262}  Gastrointestinal:   Abdominal Examination:  Abdomen nontender to palpation, tone normal without rigidity or guarding, no masses present, umbilicus without lesions   Liver and Spleen:  No hepatomegaly present, liver nontender to palpation    Hernias:  No hernias present  Lymphatic: Lymph Nodes:  No other lymphadenopathy present  Skin:  General Inspection:  No rashes present, no lesions present, no areas of  discoloration  Neurologic:    Mental Status:  Oriented X3.  Normal strength and tone, sensory exam                grossly normal, mentation intact and speech normal.    Psychiatric:   Mentation appears normal and affect normal/bright.         {[facility] Pelvic " "Exam:955096}    COUNSELING:   {FEMALE COUNSELING MESSAGES:768040::\"Reviewed preventive health counseling, as reflected in patient instructions\"}    BMI: There is no height or weight on file to calculate BMI.  {Obesity Action Plan -- Delete if BMA < 25:321870}    ASSESSMENT:  31 year old female with satisfactory annual exam.  No diagnosis found.    PLAN:  ***    Ann-Marie Andrea MD  "

## 2022-07-11 NOTE — PROGRESS NOTES
"[facility]  OB/GYN Clinic Note    CC: Other    HPI: Melba Galvan is a 31 year old  who comes in for annual gyn exam.   Has not had blood work since pregnancy, wanting to check on hgb. Was anemic postpartum.   Having bilateral anterior knee pain, particularly when squating. Unable to go up and down stairs. Chiropractor did exam on knees. Has been seeing PT since giving birth. Doing exercises at home, still not improving.    Currently breast feeding. No periods. Periods before pregnancy were normal.   Contraception- condoms. Denies need for alternative form of contraception at this time.   Paps- normal  STI testing- declines.   Family history of cancer-negative.     History reviewed. No pertinent past medical history.    Past Surgical History:   Procedure Laterality Date     NO HISTORY OF SURGERY         Current Outpatient Medications:      Prenatal w/o A Vit-Fe Fum-FA (PRENATA PO), Take 1 tablet by mouth daily , Disp: , Rfl:      No Known Allergies    Family History   Problem Relation Age of Onset     No Known Problems Mother      No Known Problems Father      Social Determinants of Health     Tobacco Use: Medium Risk     Smoking Tobacco Use: Former Smoker     Smokeless Tobacco Use: Never Used   Alcohol Use: Not on file   Financial Resource Strain: Not on file   Food Insecurity: Not on file   Transportation Needs: Not on file   Physical Activity: Not on file   Stress: Not on file   Social Connections: Not on file   Intimate Partner Violence: Not on file   Depression: Not at risk     PHQ-2 Score: 0   Housing Stability: Not on file     O:  Vitals:    22 1607   BP: 92/58   Weight: 69.9 kg (154 lb)   Height: 1.702 m (5' 7\")       Gen: well appearing in NAD  Breast: no concerns, deferred  : No concerns, deferred      A/P:    ICD-10-CM    1. Women's annual routine gynecological examination  Z01.419    2. Anemia due to acute blood loss  D62 CBC with platelets       Melba Galvan is a " 31 year old  who presents to establish care.  No gyn concerns. Due for pap in . Mammograms age 38.   CBC today  Recommend establishing care with PCP for additional eval of knee pain.   Paperwork completed for postpartum insurance benefit from 22 visit.     Ann-Marie Andrea MD, MHS  22

## 2022-07-12 ENCOUNTER — OFFICE VISIT (OUTPATIENT)
Dept: ORTHOPEDICS | Facility: CLINIC | Age: 32
End: 2022-07-12
Payer: COMMERCIAL

## 2022-07-12 VITALS
DIASTOLIC BLOOD PRESSURE: 62 MMHG | BODY MASS INDEX: 23.98 KG/M2 | WEIGHT: 152.8 LBS | HEIGHT: 67 IN | SYSTOLIC BLOOD PRESSURE: 102 MMHG

## 2022-07-12 DIAGNOSIS — M22.2X1 PATELLOFEMORAL PAIN SYNDROME OF BOTH KNEES: Primary | ICD-10-CM

## 2022-07-12 DIAGNOSIS — M22.2X2 PATELLOFEMORAL PAIN SYNDROME OF BOTH KNEES: Primary | ICD-10-CM

## 2022-07-12 PROCEDURE — 99203 OFFICE O/P NEW LOW 30 MIN: CPT | Performed by: FAMILY MEDICINE

## 2022-07-12 NOTE — PATIENT INSTRUCTIONS
1. Patellofemoral pain syndrome of both knees      -Patient has bilateral knee pain due to inflammation of the patellofemoral joint  -Patient will start formal physical therapy and home exercise program  -Patient will start Voltaren gel to be applied to both knees 2-3 times a day as needed for pain.  Patient will also ice the knees as needed.  Patient may take an occasional oral Tylenol medication for breakthrough pain.  Patient should avoid oral anti-inflammatory medications since she is currently breast-feeding.  -Patient may return to the gym and perform low impact exercises.  Patient may continue with upper body workouts as tolerated  -Patient may purchase over-the-counter patellar stabilizing knee braces  -Patient will follow up if pain does not improve  -Call direct clinic number [[phone]] at any time with questions or concerns.    Albert Yeo MD [facility]  [facility] [place]

## 2022-07-12 NOTE — LETTER
7/12/2022         RE: Melba Galvan  [address] [place] [address]        Dear Colleague,    Thank you for referring your patient, Melba Galvan, to the [facility] [place]. Please see a copy of my visit note below.    ASSESSMENT & PLAN  Patient Instructions     1. Patellofemoral pain syndrome of both knees      -Patient has bilateral knee pain due to inflammation of the patellofemoral joint  -Patient will start formal physical therapy and home exercise program  -Patient will start Voltaren gel to be applied to both knees 2-3 times a day as needed for pain.  Patient will also ice the knees as needed.  Patient may take an occasional oral Tylenol medication for breakthrough pain.  Patient should avoid oral anti-inflammatory medications since she is currently breast-feeding.  -Patient may return to the gym and perform low impact exercises.  Patient may continue with upper body workouts as tolerated  -Patient may purchase over-the-counter patellar stabilizing knee braces  -Patient will follow up if pain does not improve  -Call direct clinic number [[phone]] at any time with questions or concerns.    Albert Yeo MD [facility]  [facility]          -----    SUBJECTIVE  Melba Galvan is a/an 31 year old female who is seen as a self referral for evaluation of bilateral knee pain. The patient is seen by themselves.  Patient states she had her daughter 7 months ago, notes when her daughter was first born for the first 4 months her daughter would only sleep when patient held her in a deep squat. Notes since then, she has anterior knee pain    Onset: 3 month(s) ago. Reports insidious onset without acute precipitating event.  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  Social History     Socioeconomic History     Marital status:    Tobacco Use     Smoking status: Former Smoker     Packs/day: 0.50     Years: 10.00     Pack years: 5.00     Types: Hookah     Start date: 2010     Quit date: 2020     Years since quittin.8     Smokeless tobacco: Never Used   Vaping Use     Vaping Use: Never used   Substance and Sexual Activity     Alcohol use: Never     Drug use: Never     Sexual activity: Yes     Partners: Male     Birth control/protection: Condom   Other Topics Concern     Parent/sibling w/ CABG, MI or angioplasty before 65F 55M? 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 Came here for pharmacist exam- fell in love with nature and staying         Patient's past medical, surgical, social, and family histories were reviewed today and no changes are noted.    REVIEW OF SYSTEMS:  10 point ROS is negative other than symptoms noted above in HPI, Past Medical History or as stated below  Constitutional: NEGATIVE for fever, chills, change in weight  Skin: NEGATIVE for worrisome rashes, moles or lesions  GI/: NEGATIVE for bowel or bladder changes  Neuro: NEGATIVE for weakness, dizziness or paresthesias    OBJECTIVE:  /62   Ht 1.702 m (5' 7\")   Wt 69.3 kg (152 lb 12.8 oz)   BMI 23.93 kg/m     General: healthy, alert and in no distress  HEENT: no scleral icterus or conjunctival erythema  Skin: no suspicious lesions or rash. No jaundice.  CV: no pedal edema  Resp: " normal respiratory effort without conversational dyspnea   Psych: normal mood and affect  Gait: normal steady gait with appropriate coordination and balance  Neuro: Normal light sensory exam of lower extremity  MSK:  BILATERAL KNEE  Inspection:    normal alignment  Palpation:    Tender about the lateral patellar facet, lateral joint line and medial joint line. Remainder of bony and ligamentous landmarks are nontender.    No effusion is present    Patellofemoral crepitus is Absent  Range of Motion:     00 extension to 1350 flexion  Strength:    Quadriceps grossly intact    Extensor mechanism intact  Special Tests:    Positive: Patellar grind    Negative: MCL/valgus stress (0 & 30 deg), LCL/varus stress (0 & 30 deg), Lachman's, anterior drawer, posterior drawer, Chai's    Independent visualization of the below image:  No results found for this or any previous visit (from the past 24 hour(s)).        Albert Yeo MD [facility]        Again, thank you for allowing me to participate in the care of your patient.        Sincerely,        Albert Yeo, MD

## 2022-07-12 NOTE — PROGRESS NOTES
ASSESSMENT & PLAN  Patient Instructions     1. Patellofemoral pain syndrome of both knees      -Patient has bilateral knee pain due to inflammation of the patellofemoral joint  -Patient will start formal physical therapy and home exercise program  -Patient will start Voltaren gel to be applied to both knees 2-3 times a day as needed for pain.  Patient will also ice the knees as needed.  Patient may take an occasional oral Tylenol medication for breakthrough pain.  Patient should avoid oral anti-inflammatory medications since she is currently breast-feeding.  -Patient may return to the gym and perform low impact exercises.  Patient may continue with upper body workouts as tolerated  -Patient may purchase over-the-counter patellar stabilizing knee braces  -Patient will follow up if pain does not improve  -Call direct clinic number [[phone]] at any time with questions or concerns.    Albert Yeo MD [facility] and [facility] Medicine  [facility]          -----    SUBJECTIVE  Melba Galvan is a/an 31 year old female who is seen as a self referral for evaluation of bilateral knee pain. The patient is seen by themselves.  Patient states she had her daughter 7 months ago, notes when her daughter was first born for the first 4 months her daughter would only sleep when patient held her in a deep squat. Notes since then, she has anterior knee pain    Onset: 3 month(s) ago. Reports insidious onset without acute precipitating event.  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  Social History     Socioeconomic History     Marital status:    Tobacco Use     Smoking status: Former Smoker     Packs/day: 0.50     Years: 10.00     Pack years: 5.00     Types: Hookah     Start date: 2010     Quit date: 2020     Years since quittin.8     Smokeless tobacco: Never Used   Vaping Use     Vaping Use: Never used   Substance and Sexual Activity     Alcohol use: Never     Drug use: Never     Sexual activity: Yes     Partners: Male     Birth control/protection: Condom   Other Topics Concern     Parent/sibling w/ CABG, MI or angioplasty before 65F 55M? 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 Came here for pharmacist exam- fell in love with nature and staying         Patient's past medical, surgical, social, and family histories were reviewed today and no changes are noted.    REVIEW OF SYSTEMS:  10 point ROS is negative other than symptoms noted above in HPI, Past Medical History or as stated below  Constitutional: NEGATIVE for fever, chills, change in weight  Skin: NEGATIVE for worrisome rashes, moles or lesions  GI/: NEGATIVE for bowel or bladder changes  Neuro: NEGATIVE for weakness, dizziness or paresthesias    OBJECTIVE:  /62   Ht 1.702 m (5' 7\")   Wt 69.3 kg (152 lb 12.8 oz)   BMI 23.93 kg/m     General: healthy, alert and in no distress  HEENT: no scleral icterus or conjunctival erythema  Skin: no suspicious lesions or rash. No jaundice.  CV: no pedal edema  Resp: normal respiratory effort without conversational dyspnea   Psych: normal mood and affect  Gait: normal steady gait with appropriate coordination and balance  Neuro: Normal light sensory exam of lower extremity  MSK:  BILATERAL KNEE  Inspection:    normal " alignment  Palpation:    Tender about the lateral patellar facet, lateral joint line and medial joint line. Remainder of bony and ligamentous landmarks are nontender.    No effusion is present    Patellofemoral crepitus is Absent  Range of Motion:     00 extension to 1350 flexion  Strength:    Quadriceps grossly intact    Extensor mechanism intact  Special Tests:    Positive: Patellar grind    Negative: MCL/valgus stress (0 & 30 deg), LCL/varus stress (0 & 30 deg), Lachman's, anterior drawer, posterior drawer, Chai's    Independent visualization of the below image:  No results found for this or any previous visit (from the past 24 hour(s)).        Albert Yeo MD [facility]

## 2022-07-14 ENCOUNTER — THERAPY VISIT (OUTPATIENT)
Dept: PHYSICAL THERAPY | Facility: CLINIC | Age: 32
End: 2022-07-14
Attending: FAMILY MEDICINE
Payer: COMMERCIAL

## 2022-07-14 DIAGNOSIS — M22.2X2 PATELLOFEMORAL PAIN SYNDROME OF BOTH KNEES: ICD-10-CM

## 2022-07-14 DIAGNOSIS — M22.2X1 PATELLOFEMORAL PAIN SYNDROME OF BOTH KNEES: ICD-10-CM

## 2022-07-14 PROCEDURE — 97110 THERAPEUTIC EXERCISES: CPT | Mod: GP | Performed by: PHYSICAL THERAPIST

## 2022-07-14 PROCEDURE — 97161 PT EVAL LOW COMPLEX 20 MIN: CPT | Mod: GP | Performed by: PHYSICAL THERAPIST

## 2022-07-14 ASSESSMENT — ACTIVITIES OF DAILY LIVING (ADL)
STIFFNESS: I DO NOT HAVE THE SYMPTOM
KNEEL ON THE FRONT OF YOUR KNEE: ACTIVITY IS VERY DIFFICULT
RAW_SCORE: 37.69
GO UP STAIRS: ACTIVITY IS VERY DIFFICULT
GO DOWN STAIRS: ACTIVITY IS VERY DIFFICULT
SWELLING: I DO NOT HAVE THE SYMPTOM
HOW_WOULD_YOU_RATE_THE_OVERALL_FUNCTION_OF_YOUR_KNEE_DURING_YOUR_USUAL_DAILY_ACTIVITIES?: ABNORMAL
STAND: ACTIVITY IS MINIMALLY DIFFICULT
WALK: ACTIVITY IS MINIMALLY DIFFICULT
GIVING WAY, BUCKLING OR SHIFTING OF KNEE: NOT ANSWERED
WEAKNESS: THE SYMPTOM AFFECTS MY ACTIVITY MODERATELY
SIT WITH YOUR KNEE BENT: ACTIVITY IS FAIRLY DIFFICULT
LIMPING: I DO NOT HAVE THE SYMPTOM
SQUAT: ACTIVITY IS VERY DIFFICULT
KNEE_ACTIVITY_OF_DAILY_LIVING_SUM: 35
AS_A_RESULT_OF_YOUR_KNEE_INJURY,_HOW_WOULD_YOU_RATE_YOUR_CURRENT_LEVEL_OF_DAILY_ACTIVITY?: ABNORMAL
RISE FROM A CHAIR: ACTIVITY IS FAIRLY DIFFICULT
PAIN: THE SYMPTOM AFFECTS MY ACTIVITY MODERATELY
KNEE_ACTIVITY_OF_DAILY_LIVING_SCORE: 53.84
HOW_WOULD_YOU_RATE_THE_CURRENT_FUNCTION_OF_YOUR_KNEE_DURING_YOUR_USUAL_DAILY_ACTIVITIES_ON_A_SCALE_FROM_0_TO_100_WITH_100_BEING_YOUR_LEVEL_OF_KNEE_FUNCTION_PRIOR_TO_YOUR_INJURY_AND_0_BEING_THE_INABILITY_TO_PERFORM_ANY_OF_YOUR_USUAL_DAILY_ACTIVITIES?: 60

## 2022-07-14 NOTE — PROGRESS NOTES
Physical Therapy Initial Evaluation  Subjective:  The history is provided by the patient. No  was used.   Therapist Generated HPI Evaluation  Problem details: Pt referred to therapy with diagnosis of patellofemoral syndrome bilateral knees. Pain started after using a standing squat position to rock  to sleep. Did this for 4 months and pain as a result of this position. Infant now 7 months old so not having to do this anymore, but still sore.  Bilateral knee pain, both right and left.  Constant achiness bilateral knees that worsens with bending, squatting, stairs. Better with walking. Pain with kneeling on knees. .         Type of problem:  Bilateral knees.    This is a chronic condition.  Condition occurred with:  Insidious onset.  Where condition occurred: at home.  Patient reports pain:  Sub patellar.  Pain is described as aching, burning and sharp and is constant.  Pain is the same all the time.  Since onset symptoms are gradually worsening.  Associated symptoms:  Loss of strength and loss of motion/stiffness. Symptoms are exacerbated by bending/squatting, kneeling, certain positions, descending stairs and ascending stairs  and relieved by rest.      Restrictions due to condition include:  Working in normal job without restrictions.  Barriers include:  None as reported by patient.    Patient Health History  Melba Galvan being seen for knee pain.     Problem began: 2022 (md referral date; pain present x 4-5 months).   Problem occurred: rocking  to sleep for 4 months   Pain is reported as 7/10 on pain scale.  General health as reported by patient is good.     Red flags:  None as reported by patient.  Medical allergies: none.   Surgeries include:  None.    Current medications:  None.    Current occupation is .   Primary job tasks include:  Computer work and prolonged sitting.                                    Objective:  System                                            Hip Evaluation    Hip Strength:    Flexion:   Left: 5/5   Pain:  Right: 5-/5   Pain:                    Extension:  Left: 5/5  Pain:Right: 5-/5    Pain:    Abduction:  Left: 5-/5     Pain:Right: 4+/5    Pain:  Adduction:  Left: 5/5    Pain:Right: 5/5   Pain:  Internal Rotation:  Left: 5/5    Pain:Right: 5/5   Pain:  External Rotation:  Left: 5-/5   Pain:  Right: 5-/5   Pain:                     Knee Evaluation:  ROM:  AROM: normal  PROM: normal    AROM    Hyperextension:  Left:  0    Right: 0  Extension:  Left: 0    Right:  0  Flexion: Left: 135    Right: 135        Strength:     Extension:  Left: 5/5    Pain:+/-      Right: 5/5    Pain:+/-  Flexion:  Left: 5/5    Pain:-      Right: 5/5    Pain:-    Quad Set Left:  Fair    Pain: +/-   Quad Set Right:  Fair    Pain: +/-  Ligament Testing:  Normal                Special Tests:   Left knee positive for the following special tests:  Patellar Compression and Patellar Tracking-Abduction Medial  Right knee positive for the following tests:  Patellar Compression and Patellar Tracking-Abduction Medial  Palpation:    Left knee tenderness present at:  Patellar Tendon and Patellar Inferior  Right knee tenderness present at:  Patellar Tendon and Patellar Inferior      Functional Testing:          Quad:    Single Leg Squat:  Left:      Right:        Bilateral Leg Squat:   Mild loss of control and femoral IR      Proprioception:   Stork Balance Test:  Left:  30 sec  Right:  30 sec  % of Uninvolved:           General     ROS    Assessment/Plan:    Patient is a 31 year old female with both sides knee complaints.    Patient has the following significant findings with corresponding treatment plan.                Diagnosis 1:  Patellofemoral pain syndrome bilateral knees  Pain -  hot/cold therapy, manual therapy and home program  Decreased ROM/flexibility - manual therapy, therapeutic exercise and home program  Decreased joint mobility - manual therapy, therapeutic exercise  and home program  Decreased strength - therapeutic exercise, therapeutic activities and home program  Impaired balance - neuro re-education, therapeutic activities and home program    Therapy Evaluation Codes:   1) Clinical presentation characteristics are:   Stable/Uncomplicated.  2) Decision-Making    Low complexity using standardized patient assessment instrument and/or measureable assessment of functional outcome.  Cumulative Therapy Evaluation is: Low complexity.    Previous and current functional limitations:  (See Goal Flow Sheet for this information)    Short term and Long term goals: (See Goal Flow Sheet for this information)     Communication ability:  Patient appears to be able to clearly communicate and understand verbal and written communication and follow directions correctly.  Treatment Explanation - The following has been discussed with the patient:   RX ordered/plan of care  Anticipated outcomes  Possible risks and side effects  This patient would benefit from PT intervention to resume normal activities.   Rehab potential is good.    Frequency:  2 X a month, once daily  Duration:  for 12 weeks  Discharge Plan:  Achieve all LTG.  Independent in home treatment program.  Reach maximal therapeutic benefit.    Please refer to the daily flowsheet for treatment today, total treatment time and time spent performing 1:1 timed codes.

## 2022-07-14 NOTE — PROGRESS NOTES
[facility]    OUTPATIENT Physical Therapy ORTHOPEDIC EVALUATION  PLAN OF TREATMENT FOR OUTPATIENT REHABILITATION  (COMPLETE FOR INITIAL CLAIMS ONLY)  Patient's Last Name, First Name, M.I.  YOB: 1990  Melba Galvan    Provider s Name:  SEBASTIEN [facility]   Medical Record No.  [id number]   Start of Care Date:  07/14/22   Onset Date:   07/12/22 (md referral date)   Type:     _X__PT   ___OT Medical Diagnosis:    Encounter Diagnosis   Name Primary?    Patellofemoral pain syndrome of both knees         Treatment Diagnosis:  patellofemoral pain syndrome bilateral knees        Goals:     07/14/22 0500   Body Part   Goals listed below are for bilateral knee   Goal #1   Goal #1 squatting/kneeling   Previous Functional Level No restrictions   Current Functional Level Can kneel with pain of level;Can do a partial squat;Can squat with pain of level    Performance Level Pain 7/10   STG Target Performance Kneel on a soft surface;Reduce pain with kneeling to;Reduce pain with squatting to ;Do a partial squat   Performance Level 3/10   Rationale for proper body mechanics while performing housework;for proper body mechanics while performing yardwork and home maintenance;for proper body mechanics when lifting, personal hygiene, dressing   Due date 08/25/22   LTG Target Performance Do a full squat;Kneel on a soft surface;Reduce pain with kneeling to;Reduce pain with squatting to    Performance Level pain 1/10   Rationale for proper body mechanics while performing housework;for proper body mechanics while performing yardwork and home maintenance;for proper body mechanics when lifting, personal hygiene, dressing   Due date 10/06/22       Therapy Frequency:  2x/month  Predicted Duration of Therapy Intervention:  12 weeks    Kristin Baer, PT                 I CERTIFY THE NEED FOR THESE SERVICES  FURNISHED UNDER        THIS PLAN OF TREATMENT AND WHILE UNDER MY CARE     (Physician attestation of this document indicates review and certification of the therapy plan).                     Certification Date From:  07/14/22   Certification Date To:  10/06/22    Referring Provider:  Albert Yeo    Initial Assessment        See Epic Evaluation SOC Date: 07/14/22

## 2022-07-27 NOTE — TELEPHONE ENCOUNTER
Innatal results: Negative  Fetal Fraction: 5 %  Preparent Select Carrier Screen - Standard Panel: Negative    TEST RESULT INTERPRETATION   Chromosome 21 No aneuploidy detected  Results consistent with two copies of chromosome 21   Chromosome 18 No aneuploidy detected  Results consistent with two copies of chromosome 18   Chromosome 13 No aneuploidy detected  Results consistent with two copies of chromosome 13   Sex Chromosome No aneuploidy detected  Results consistent with two sex chromosomes:  female     Results received from [facility] testing in [facility] triage.    Testing done:  Innatal Prenatal Screen and preparent select carrier screen    Action:  LMTCB    Gender:  Will ask patient if they wish to know the gender.    Carmen Belle RN      
LMTCB    Carmen Belle RN on 6/8/2021 at 10:03 AM    
Left  message for patient to return call to clinic.    Ameena Paz RN    
Patient's  returned call and would like more details on the results. Gender of baby given.  
Results of Innatal and Preparent discussed with patient. Questions answered. Thanked me for a call back. No further questions.    Ameena Paz RN    
PAST MEDICAL HISTORY:  HTN (hypertension)

## 2022-08-19 ENCOUNTER — THERAPY VISIT (OUTPATIENT)
Dept: PHYSICAL THERAPY | Facility: CLINIC | Age: 32
End: 2022-08-19
Payer: COMMERCIAL

## 2022-08-19 DIAGNOSIS — M22.2X1 PATELLOFEMORAL PAIN SYNDROME OF BOTH KNEES: Primary | ICD-10-CM

## 2022-08-19 DIAGNOSIS — M22.2X2 PATELLOFEMORAL PAIN SYNDROME OF BOTH KNEES: Primary | ICD-10-CM

## 2022-08-19 PROCEDURE — 97530 THERAPEUTIC ACTIVITIES: CPT | Mod: GP | Performed by: PHYSICAL THERAPIST

## 2022-08-19 PROCEDURE — 97110 THERAPEUTIC EXERCISES: CPT | Mod: 59 | Performed by: PHYSICAL THERAPIST

## 2022-08-29 NOTE — PROGRESS NOTES
"ASSESSMENT & PLAN  Patient Instructions     1. Patellofemoral pain syndrome of both knees      -Patient is following up for bilateral knee pain due to patellofemoral syndrome, left significantly worse than the right.  -Patient reports worsening pain with physical therapy and daily activities  -States that she has had minimal improvement with usage of Voltaren gel, however she is using it maybe once or twice a day.  Patient will try to use the Voltaren gel 3 times a day to be more effective.  -Patient will ice the knee as needed  -Patient will purchase a patellar stabilizing brace on [employer].  Patient was given 3 options to try that she can return the brace as I do not feel comfortable  -Patient is currently breast-feeding and so will continue to avoid oral anti-inflammatory medication  -Patient states that she has a long flights scheduled in approximately 4 weeks which she is concerned about.  -Patient will continue with physical therapy exercises  -Patient will follow up in approximately 2 weeks for potential left knee intra-articular cortisone injection.  -Call direct clinic number [[phone]] at any time with questions or concerns.    Albert Yeo MD [facility] Orthopedics and Sports Medicine  [facility]          -----    SUBJECTIVE:  Melba Galvan is a 32 year old female who is seen in follow-up for bilateral knee pain, left worse than right.They were last seen 7/12/2022.    Since their last visit reports worsening pain in the left knee, right knee feels the same since last office visit. She states left knee made a \"turn for the worse\" since last office visit, denies specific injury. They indicate that their current pain level is 6/10 at rest in the left knee, 8/10 with activity in the right knee. Left knee painful with any movement, feels like left knee is inflamed. Right knee pain 5/10. They have tried physical therapy (2 sessions), Voltaren Gel with little relief.       The patient is " "seen by themselves.    Patient's past medical, surgical, social, and family histories were reviewed today and no changes are noted.    REVIEW OF SYSTEMS:  Constitutional: NEGATIVE for fever, chills, change in weight  Skin: NEGATIVE for worrisome rashes, moles or lesions  GI/: NEGATIVE for bowel or bladder changes  Neuro: NEGATIVE for weakness, dizziness or paresthesias    OBJECTIVE:  BP (!) 89/60   Ht 1.702 m (5' 7\")   Wt 68.3 kg (150 lb 9.6 oz)   BMI 23.59 kg/m     General: healthy, alert and in no distress  HEENT: no scleral icterus or conjunctival erythema  Skin: no suspicious lesions or rash. No jaundice.  CV: regular rhythm by palpation, no pedal edema  Resp: normal respiratory effort without conversational dyspnea   Psych: normal mood and affect  Gait: normal steady gait with appropriate coordination and balance  Neuro: normal light touch sensory exam of the extremities.    MSK:  BILATERAL KNEE  Inspection:    normal alignment  Palpation:    Tender about the lateral patellar facet, lateral joint line and medial joint line. Remainder of bony and ligamentous landmarks are nontender.    No effusion is present    Patellofemoral crepitus is Absent  Range of Motion:     00 extension to 1350 flexion  Strength:    Quadriceps grossly intact    Extensor mechanism intact  Special Tests:    Positive: Patellar grind    Negative: MCL/valgus stress (0 & 30 deg), LCL/varus stress (0 & 30 deg), Lachman's, anterior drawer, posterior drawer, Chai's    Independent visualization of the below image:    Patient's conditions were thoroughly discussed during today's visit with total time spent face-to-face with the patient and documentation being 22 minutes.    Albert Yeo MD, [facility] Orthopedic Care        "

## 2022-08-30 ENCOUNTER — OFFICE VISIT (OUTPATIENT)
Dept: ORTHOPEDICS | Facility: CLINIC | Age: 32
End: 2022-08-30
Payer: COMMERCIAL

## 2022-08-30 ENCOUNTER — MYC MEDICAL ADVICE (OUTPATIENT)
Dept: ORTHOPEDICS | Facility: CLINIC | Age: 32
End: 2022-08-30

## 2022-08-30 VITALS
DIASTOLIC BLOOD PRESSURE: 60 MMHG | WEIGHT: 150.6 LBS | HEIGHT: 67 IN | BODY MASS INDEX: 23.64 KG/M2 | SYSTOLIC BLOOD PRESSURE: 89 MMHG

## 2022-08-30 DIAGNOSIS — M22.2X1 PATELLOFEMORAL PAIN SYNDROME OF BOTH KNEES: ICD-10-CM

## 2022-08-30 DIAGNOSIS — M22.2X1 PATELLOFEMORAL PAIN SYNDROME OF BOTH KNEES: Primary | ICD-10-CM

## 2022-08-30 DIAGNOSIS — M22.2X2 PATELLOFEMORAL PAIN SYNDROME OF BOTH KNEES: ICD-10-CM

## 2022-08-30 DIAGNOSIS — M22.2X2 PATELLOFEMORAL PAIN SYNDROME OF BOTH KNEES: Primary | ICD-10-CM

## 2022-08-30 PROCEDURE — 99213 OFFICE O/P EST LOW 20 MIN: CPT | Performed by: FAMILY MEDICINE

## 2022-08-30 NOTE — LETTER
"    8/30/2022         RE: Melba Galvan  [address] [place] [address]        Dear Colleague,    Thank you for referring your patient, Melba Galvan, to the [facility] [place]. Please see a copy of my visit note below.    ASSESSMENT & PLAN  Patient Instructions     1. Patellofemoral pain syndrome of both knees      -Patient is following up for bilateral knee pain due to patellofemoral syndrome, left significantly worse than the right.  -Patient reports worsening pain with physical therapy and daily activities  -States that she has had minimal improvement with usage of Voltaren gel, however she is using it maybe once or twice a day.  Patient will try to use the Voltaren gel 3 times a day to be more effective.  -Patient will ice the knee as needed  -Patient will purchase a patellar stabilizing brace on [employer].  Patient was given 3 options to try that she can return the brace as I do not feel comfortable  -Patient is currently breast-feeding and so will continue to avoid oral anti-inflammatory medication  -Patient states that she has a long flights scheduled in approximately 4 weeks which she is concerned about.  -Patient will continue with physical therapy exercises  -Patient will follow up in approximately 2 weeks for potential left knee intra-articular cortisone injection.  -Call direct clinic number [[phone]] at any time with questions or concerns.    Albert Yeo MD [facility] Orthopedics and Sports Medicine  [facility]          -----    SUBJECTIVE:  Melba Galvan is a 32 year old female who is seen in follow-up for bilateral knee pain, left worse than right.They were last seen 7/12/2022.    Since their last visit reports worsening pain in the left knee, right knee feels the same since last office visit. She states left knee made a \"turn for the worse\" since last office visit, denies specific injury. They indicate that their current pain level is 6/10 " "at rest in the left knee, 8/10 with activity in the right knee. Left knee painful with any movement, feels like left knee is inflamed. Right knee pain 5/10. They have tried physical therapy (2 sessions), Voltaren Gel with little relief.       The patient is seen by themselves.    Patient's past medical, surgical, social, and family histories were reviewed today and no changes are noted.    REVIEW OF SYSTEMS:  Constitutional: NEGATIVE for fever, chills, change in weight  Skin: NEGATIVE for worrisome rashes, moles or lesions  GI/: NEGATIVE for bowel or bladder changes  Neuro: NEGATIVE for weakness, dizziness or paresthesias    OBJECTIVE:  BP (!) 89/60   Ht 1.702 m (5' 7\")   Wt 68.3 kg (150 lb 9.6 oz)   BMI 23.59 kg/m     General: healthy, alert and in no distress  HEENT: no scleral icterus or conjunctival erythema  Skin: no suspicious lesions or rash. No jaundice.  CV: regular rhythm by palpation, no pedal edema  Resp: normal respiratory effort without conversational dyspnea   Psych: normal mood and affect  Gait: normal steady gait with appropriate coordination and balance  Neuro: normal light touch sensory exam of the extremities.    MSK:  BILATERAL KNEE  Inspection:    normal alignment  Palpation:    Tender about the lateral patellar facet, lateral joint line and medial joint line. Remainder of bony and ligamentous landmarks are nontender.    No effusion is present    Patellofemoral crepitus is Absent  Range of Motion:     00 extension to 1350 flexion  Strength:    Quadriceps grossly intact    Extensor mechanism intact  Special Tests:    Positive: Patellar grind    Negative: MCL/valgus stress (0 & 30 deg), LCL/varus stress (0 & 30 deg), Lachman's, anterior drawer, posterior drawer, Chai's    Independent visualization of the below image:    Patient's conditions were thoroughly discussed during today's visit with total time spent face-to-face with the patient and documentation being 22 minutes.    Albert Yeo " MD, [facility] Orthopedic [facility]            Again, thank you for allowing me to participate in the care of your patient.        Sincerely,        Albert Yeo, MD

## 2022-08-30 NOTE — TELEPHONE ENCOUNTER
Patient seen today for bilateral patellofemoral pain, requesting Voltaren Gel refill    Rx pending    Vika Angelo ATC

## 2022-08-30 NOTE — PATIENT INSTRUCTIONS
1. Patellofemoral pain syndrome of both knees      -Patient is following up for bilateral knee pain due to patellofemoral syndrome, left significantly worse than the right.  -Patient reports worsening pain with physical therapy and daily activities  -States that she has had minimal improvement with usage of Voltaren gel, however she is using it maybe once or twice a day.  Patient will try to use the Voltaren gel 3 times a day to be more effective.  -Patient will ice the knee as needed  -Patient will purchase a patellar stabilizing brace on [employer].  Patient was given 3 options to try that she can return the brace as I do not feel comfortable  -Patient is currently breast-feeding and so will continue to avoid oral anti-inflammatory medication  -Patient states that she has a long flights scheduled in approximately 4 weeks which she is concerned about.  -Patient will continue with physical therapy exercises  -Patient will follow up in approximately 2 weeks for potential left knee intra-articular cortisone injection.  -Call direct clinic number [[phone]] at any time with questions or concerns.    Al[facility] Yeo MD [facility]  [facility] [place]

## 2022-09-09 NOTE — PROGRESS NOTES
"ASSESSMENT & PLAN  Patient Instructions     1. Patellofemoral pain syndrome of both knees      -Patient is following up for chronic bilateral knee pain due to patellofemoral syndrome  -Patient has noticed worsening pain with physical therapy, home exercises and topical Voltaren gel  -Patient tolerated bilateral knee intra-articular cortisone injection today without complications.  Patient was given postprocedure instructions  -Patient will restart physical therapy and home exercises once pain improves and slowly progress activities as her pain allows  -Patient will follow up if pain does not improve  -Call direct clinic number [[phone]] at any time with questions or concerns.    Albert Yeo MD [facility] Medicine            -----    SUBJECTIVE:  Melba Galvan is a 32 year old female who is seen in follow-up for bilateral knee pain.They were last seen 8/30/2022     Since their last visit reports worsening pain in both knees. She states both knees are equally painful. They indicate that their current pain level is 2/10 at rest, 6/10 with activity such as sitting to standing. They have tried Voltaren Gel, physical therapy (3 visits) which is not helping with pain but feels she is getting stronger.    The patient is seen by themselves.    Patient's past medical, surgical, social, and family histories were reviewed today and no changes are noted.    REVIEW OF SYSTEMS:  Constitutional: NEGATIVE for fever, chills, change in weight  Skin: NEGATIVE for worrisome rashes, moles or lesions  GI/: NEGATIVE for bowel or bladder changes  Neuro: NEGATIVE for weakness, dizziness or paresthesias    OBJECTIVE:  BP 92/70   Ht 1.702 m (5' 7\")   Wt 69.1 kg (152 lb 6.4 oz)   BMI 23.87 kg/m     General: healthy, alert and in no distress  HEENT: no scleral icterus or conjunctival erythema  Skin: no suspicious lesions or rash. No jaundice.  CV: regular rhythm by palpation, no " pedal edema  Resp: normal respiratory effort without conversational dyspnea   Psych: normal mood and affect  Gait: normal steady gait with appropriate coordination and balance  Neuro: normal light touch sensory exam of the extremities.    MSK:  BILATERAL KNEE  Inspection:    normal alignment  Palpation:    Tender about the lateral patellar facet, lateral joint line and medial joint line. Remainder of bony and ligamentous landmarks are nontender.    No effusion is present    Patellofemoral crepitus is Absent  Range of Motion:     00 extension to 1350 flexion  Strength:    Quadriceps grossly intact    Extensor mechanism intact  Special Tests:    Positive: Patellar grind    Negative: MCL/valgus stress (0 & 30 deg), LCL/varus stress (0 & 30 deg), Lachman's, anterior drawer, posterior drawer, Chai's    Independent visualization of the below image:  Large Joint Injection/Arthocentesis: bilateral knee    Date/Time: 9/13/2022 2:43 PM  Performed by: Yeo, Albert, MD  Authorized by: Yeo, Albert, MD     Indications:  Pain and osteoarthritis  Needle Size:  25 G  Guidance: ultrasound    Approach:  Lateral  Location:  Knee  Laterality:  Bilateral      Medications (Right):  40 mg methylPREDNISolone 40 MG/ML  Medications (Left):  40 mg methylPREDNISolone 40 MG/ML  Outcome:  Tolerated well, no immediate complications  Procedure discussed: discussed risks, benefits, and alternatives    Consent Given by:  Patient  Prep: patient was prepped and draped in usual sterile fashion          Patient's conditions were thoroughly discussed during today's visit with total time spent face-to-face with the patient and documentation being 24 minutes.    Albert Yeo MD, [facility]

## 2022-09-13 ENCOUNTER — OFFICE VISIT (OUTPATIENT)
Dept: ORTHOPEDICS | Facility: CLINIC | Age: 32
End: 2022-09-13

## 2022-09-13 ENCOUNTER — THERAPY VISIT (OUTPATIENT)
Dept: PHYSICAL THERAPY | Facility: CLINIC | Age: 32
End: 2022-09-13
Payer: COMMERCIAL

## 2022-09-13 VITALS
HEIGHT: 67 IN | SYSTOLIC BLOOD PRESSURE: 92 MMHG | DIASTOLIC BLOOD PRESSURE: 70 MMHG | BODY MASS INDEX: 23.92 KG/M2 | WEIGHT: 152.4 LBS

## 2022-09-13 DIAGNOSIS — M22.2X2 PATELLOFEMORAL PAIN SYNDROME OF BOTH KNEES: Primary | ICD-10-CM

## 2022-09-13 DIAGNOSIS — M22.2X1 PATELLOFEMORAL PAIN SYNDROME OF BOTH KNEES: Primary | ICD-10-CM

## 2022-09-13 PROCEDURE — 99213 OFFICE O/P EST LOW 20 MIN: CPT | Mod: 25 | Performed by: FAMILY MEDICINE

## 2022-09-13 PROCEDURE — 97110 THERAPEUTIC EXERCISES: CPT | Mod: 59 | Performed by: PHYSICAL THERAPIST

## 2022-09-13 PROCEDURE — 97530 THERAPEUTIC ACTIVITIES: CPT | Mod: GP | Performed by: PHYSICAL THERAPIST

## 2022-09-13 PROCEDURE — 20611 DRAIN/INJ JOINT/BURSA W/US: CPT | Mod: 50 | Performed by: FAMILY MEDICINE

## 2022-09-13 RX ORDER — METHYLPREDNISOLONE ACETATE 40 MG/ML
40 INJECTION, SUSPENSION INTRA-ARTICULAR; INTRALESIONAL; INTRAMUSCULAR; SOFT TISSUE
Status: DISCONTINUED | OUTPATIENT
Start: 2022-09-13 | End: 2023-05-09

## 2022-09-13 RX ADMIN — METHYLPREDNISOLONE ACETATE 40 MG: 40 INJECTION, SUSPENSION INTRA-ARTICULAR; INTRALESIONAL; INTRAMUSCULAR; SOFT TISSUE at 14:43

## 2022-09-13 NOTE — PATIENT INSTRUCTIONS
1. Patellofemoral pain syndrome of both knees      -Patient is following up for chronic bilateral knee pain due to patellofemoral syndrome  -Patient has noticed worsening pain with physical therapy, home exercises and topical Voltaren gel  -Patient tolerated bilateral knee intra-articular cortisone injection today without complications.  Patient was given postprocedure instructions  -Patient will restart physical therapy and home exercises once pain improves and slowly progress activities as her pain allows  -Patient will follow up if pain does not improve  -Call direct clinic number [[phone]] at any time with questions or concerns.    Albert Yeo MD CAQSM  [facility]  [facility] [place]

## 2022-09-13 NOTE — LETTER
9/13/2022         RE: Melba Galvan  [address] [place] [address]        Dear Colleague,    Thank you for referring your patient, Melba Galvan, to the [facility] [place]. Please see a copy of my visit note below.    ASSESSMENT & PLAN  Patient Instructions     1. Patellofemoral pain syndrome of both knees      -Patient is following up for chronic bilateral knee pain due to patellofemoral syndrome  -Patient has noticed worsening pain with physical therapy, home exercises and topical Voltaren gel  -Patient tolerated bilateral knee intra-articular cortisone injection today without complications.  Patient was given postprocedure instructions  -Patient will restart physical therapy and home exercises once pain improves and slowly progress activities as her pain allows  -Patient will follow up if pain does not improve  -Call direct clinic number [[phone]] at any time with questions or concerns.    Albert Yeo MD [facility] Orthopedics and Sports Medicine  [facility]          -----    SUBJECTIVE:  Melba Galvan is a 32 year old female who is seen in follow-up for bilateral knee pain.They were last seen 8/30/2022     Since their last visit reports worsening pain in both knees. She states both knees are equally painful. They indicate that their current pain level is 2/10 at rest, 6/10 with activity such as sitting to standing. They have tried Voltaren Gel, physical therapy (3 visits) which is not helping with pain but feels she is getting stronger.    The patient is seen by themselves.    Patient's past medical, surgical, social, and family histories were reviewed today and no changes are noted.    REVIEW OF SYSTEMS:  Constitutional: NEGATIVE for fever, chills, change in weight  Skin: NEGATIVE for worrisome rashes, moles or lesions  GI/: NEGATIVE for bowel or bladder changes  Neuro: NEGATIVE for weakness, dizziness or paresthesias    OBJECTIVE:  BP 92/70   " Ht 1.702 m (5' 7\")   Wt 69.1 kg (152 lb 6.4 oz)   BMI 23.87 kg/m     General: healthy, alert and in no distress  HEENT: no scleral icterus or conjunctival erythema  Skin: no suspicious lesions or rash. No jaundice.  CV: regular rhythm by palpation, no pedal edema  Resp: normal respiratory effort without conversational dyspnea   Psych: normal mood and affect  Gait: normal steady gait with appropriate coordination and balance  Neuro: normal light touch sensory exam of the extremities.    MSK:  BILATERAL KNEE  Inspection:    normal alignment  Palpation:    Tender about the lateral patellar facet, lateral joint line and medial joint line. Remainder of bony and ligamentous landmarks are nontender.    No effusion is present    Patellofemoral crepitus is Absent  Range of Motion:     00 extension to 1350 flexion  Strength:    Quadriceps grossly intact    Extensor mechanism intact  Special Tests:    Positive: Patellar grind    Negative: MCL/valgus stress (0 & 30 deg), LCL/varus stress (0 & 30 deg), Lachman's, anterior drawer, posterior drawer, Chai's    Independent visualization of the below image:  Large Joint Injection/Arthocentesis: bilateral knee    Date/Time: 9/13/2022 2:43 PM  Performed by: Yeo, Albert, MD  Authorized by: Yeo, Albert, MD     Indications:  Pain and osteoarthritis  Needle Size:  25 G  Guidance: ultrasound    Approach:  Lateral  Location:  Knee  Laterality:  Bilateral      Medications (Right):  40 mg methylPREDNISolone 40 MG/ML  Medications (Left):  40 mg methylPREDNISolone 40 MG/ML  Outcome:  Tolerated well, no immediate complications  Procedure discussed: discussed risks, benefits, and alternatives    Consent Given by:  Patient  Prep: patient was prepped and draped in usual sterile fashion          Patient's conditions were thoroughly discussed during today's visit with total time spent face-to-face with the patient and documentation being 24 minutes.    Albert Yeo MD, [facility]  [employer] [facility] " [facility]opedic [facility]            Again, thank you for allowing me to participate in the care of your patient.        Sincerely,        Albert Yeo, MD

## 2022-11-16 NOTE — PROGRESS NOTES
ASSESSMENT & PLAN  Patient Instructions     1. Chronic pain of both knees    2. Patellofemoral pain syndrome of both knees    3. De Quervain's disease (radial styloid tenosynovitis)      -Patient is following up for chronic bilateral knee pain due to patellofemoral syndrome which has flared up when she returned from her trip to [place].  Patient is also complaining of new onset left wrist pain due to a tendinitis from repetitive lifting of her growing child  -Patient reports recently stopping breast-feeding and so we will now start an oral anti-inflammatory medication.  Patient will start meloxicam 15 mg daily for the next 3 to 4 weeks and then on an as-needed basis as pain improves.  Patient may continue with topical Voltaren as needed.  Patient may also take Tylenol for breakthrough pain as well  -Patient will continue with home exercise program.  I do recommend supervision with a therapist to help with her progression of strengthening and stabilization exercises.  Patient will be referred to the physical therapy department here in [place] to work with either Maria Ines or Taiwo.  -Patient may purchase an over-the-counter cock-up wrist brace to support the left wrist as needed.  -Patient will follow up in 6 to 8 weeks if pain does not improve.  To consider potential MRI of the knees at that time.  Patient may also consider a cortisone injection of the left wrist if wrist pain is persistent.  -Call direct clinic number [[phone]] at any time with questions or concerns.    Albert Yeo MD [facility] Orthopedics and Sports Medicine  [facility]          -----    SUBJECTIVE:  Melba Galvan is a 32 year old female who is seen in follow-up for bilateral knee pain.They were last seen 9/13/2022     Since their last visit reports 30% improvement. They indicate that their current pain level is 5/10. They have tried corticosteroid injection (most recent date: 9/13/2022) that provided  2 month(s) of  "relief.  She thinks the stairs in her home caused her pain to return ~2 months after corticosteriod injections, as she was in [place] and had no issues until she returned home to her stairs.  Notes since last office visit she stopped breastfeeding  Tried Voltaren Gel, physical therapy (3 visits) with home exercise program, which is not helping with pain but feels she is getting stronger     The patient is seen by themselves.    Patient's past medical, surgical, social, and family histories were reviewed today and no changes are noted.    REVIEW OF SYSTEMS:  Constitutional: NEGATIVE for fever, chills, change in weight  Skin: NEGATIVE for worrisome rashes, moles or lesions  GI/: NEGATIVE for bowel or bladder changes  Neuro: NEGATIVE for weakness, dizziness or paresthesias    OBJECTIVE:  /70   Ht 1.702 m (5' 7\")   Wt 68.9 kg (152 lb)   BMI 23.81 kg/m     General: healthy, alert and in no distress  HEENT: no scleral icterus or conjunctival erythema  Skin: no suspicious lesions or rash. No jaundice.  CV: regular rhythm by palpation, no pedal edema  Resp: normal respiratory effort without conversational dyspnea   Psych: normal mood and affect  Gait: normal steady gait with appropriate coordination and balance  Neuro: normal light touch sensory exam of the extremities.    MSK:  BILATERAL KNEE  Inspection:    normal alignment  Palpation:    Tender about the lateral patellar facet, lateral joint line and medial joint line. Remainder of bony and ligamentous landmarks are nontender.    No effusion is present    Patellofemoral crepitus is Absent  Range of Motion:     00 extension to 1350 flexion  Strength:    Quadriceps grossly intact    Extensor mechanism intact  Special Tests:    Positive: Patellar grind    Negative: MCL/valgus stress (0 & 30 deg), LCL/varus stress (0 & 30 deg), Lachman's, anterior drawer, posterior drawer, Chai's    Independent visualization of the below image:    Recent Results (from the past " 24 hour(s))   XR Knee Bilateral 3 Views    Narrative    No acute fracture, dislocation or osseous abnormalities.         Albert Yeo MD, [facility]  [place] [facility]

## 2022-11-19 ENCOUNTER — ANCILLARY PROCEDURE (OUTPATIENT)
Dept: GENERAL RADIOLOGY | Facility: CLINIC | Age: 32
End: 2022-11-19
Attending: FAMILY MEDICINE
Payer: COMMERCIAL

## 2022-11-19 ENCOUNTER — OFFICE VISIT (OUTPATIENT)
Dept: ORTHOPEDICS | Facility: CLINIC | Age: 32
End: 2022-11-19
Payer: COMMERCIAL

## 2022-11-19 VITALS
SYSTOLIC BLOOD PRESSURE: 102 MMHG | HEIGHT: 67 IN | DIASTOLIC BLOOD PRESSURE: 70 MMHG | WEIGHT: 152 LBS | BODY MASS INDEX: 23.86 KG/M2

## 2022-11-19 DIAGNOSIS — M25.562 CHRONIC PAIN OF BOTH KNEES: Primary | ICD-10-CM

## 2022-11-19 DIAGNOSIS — M65.4 DE QUERVAIN'S DISEASE (RADIAL STYLOID TENOSYNOVITIS): ICD-10-CM

## 2022-11-19 DIAGNOSIS — M22.2X1 PATELLOFEMORAL PAIN SYNDROME OF BOTH KNEES: ICD-10-CM

## 2022-11-19 DIAGNOSIS — G89.29 CHRONIC PAIN OF BOTH KNEES: Primary | ICD-10-CM

## 2022-11-19 DIAGNOSIS — M25.561 CHRONIC PAIN OF BOTH KNEES: Primary | ICD-10-CM

## 2022-11-19 DIAGNOSIS — M22.2X2 PATELLOFEMORAL PAIN SYNDROME OF BOTH KNEES: ICD-10-CM

## 2022-11-19 DIAGNOSIS — M25.562 BILATERAL KNEE PAIN: ICD-10-CM

## 2022-11-19 DIAGNOSIS — M25.561 BILATERAL KNEE PAIN: ICD-10-CM

## 2022-11-19 PROCEDURE — 99214 OFFICE O/P EST MOD 30 MIN: CPT | Performed by: FAMILY MEDICINE

## 2022-11-19 PROCEDURE — 73562 X-RAY EXAM OF KNEE 3: CPT | Mod: LT | Performed by: FAMILY MEDICINE

## 2022-11-19 RX ORDER — MELOXICAM 15 MG/1
15 TABLET ORAL DAILY
Qty: 30 TABLET | Refills: 1 | Status: SHIPPED | OUTPATIENT
Start: 2022-11-19 | End: 2023-03-01

## 2022-11-19 NOTE — LETTER
11/19/2022         RE: Melba Galvan  [address] [place] [address]        Dear Colleague,    Thank you for referring your patient, Melba Galvan, to the [facility] [place]. Please see a copy of my visit note below.    ASSESSMENT & PLAN  Patient Instructions     1. Chronic pain of both knees    2. Patellofemoral pain syndrome of both knees    3. De Quervain's disease (radial styloid tenosynovitis)      -Patient is following up for chronic bilateral knee pain due to patellofemoral syndrome which has flared up when she returned from her trip to [place].  Patient is also complaining of new onset left wrist pain due to a tendinitis from repetitive lifting of her growing child  -Patient reports recently stopping breast-feeding and so we will now start an oral anti-inflammatory medication.  Patient will start meloxicam 15 mg daily for the next 3 to 4 weeks and then on an as-needed basis as pain improves.  Patient may continue with topical Voltaren as needed.  Patient may also take Tylenol for breakthrough pain as well  -Patient will continue with home exercise program.  I do recommend supervision with a therapist to help with her progression of strengthening and stabilization exercises.  Patient will be referred to the physical therapy department here in [place] to work with either Maria Ines or Taiwo.  -Patient may purchase an over-the-counter cock-up wrist brace to support the left wrist as needed.  -Patient will follow up in 6 to 8 weeks if pain does not improve.  To consider potential MRI of the knees at that time.  Patient may also consider a cortisone injection of the left wrist if wrist pain is persistent.  -Call direct clinic number [[phone]] at any time with questions or concerns.    Albert Yeo MD [facility] Orthopedics and Sports Medicine  [facility]          -----    SUBJECTIVE:  Melba Galvan is a 32 year old female who is seen in follow-up for  "bilateral knee pain.They were last seen 9/13/2022     Since their last visit reports 30% improvement. They indicate that their current pain level is 5/10. They have tried corticosteroid injection (most recent date: 9/13/2022) that provided  2 month(s) of relief.  She thinks the stairs in her home caused her pain to return ~2 months after corticosteriod injections, as she was in [place] and had no issues until she returned home to her stairs.  Notes since last office visit she stopped breastfeeding  Tried Voltaren Gel, physical therapy (3 visits) with home exercise program, which is not helping with pain but feels she is getting stronger     The patient is seen by themselves.    Patient's past medical, surgical, social, and family histories were reviewed today and no changes are noted.    REVIEW OF SYSTEMS:  Constitutional: NEGATIVE for fever, chills, change in weight  Skin: NEGATIVE for worrisome rashes, moles or lesions  GI/: NEGATIVE for bowel or bladder changes  Neuro: NEGATIVE for weakness, dizziness or paresthesias    OBJECTIVE:  /70   Ht 1.702 m (5' 7\")   Wt 68.9 kg (152 lb)   BMI 23.81 kg/m     General: healthy, alert and in no distress  HEENT: no scleral icterus or conjunctival erythema  Skin: no suspicious lesions or rash. No jaundice.  CV: regular rhythm by palpation, no pedal edema  Resp: normal respiratory effort without conversational dyspnea   Psych: normal mood and affect  Gait: normal steady gait with appropriate coordination and balance  Neuro: normal light touch sensory exam of the extremities.    MSK:  BILATERAL KNEE  Inspection:    normal alignment  Palpation:    Tender about the lateral patellar facet, lateral joint line and medial joint line. Remainder of bony and ligamentous landmarks are nontender.    No effusion is present    Patellofemoral crepitus is Absent  Range of Motion:     00 extension to 1350 flexion  Strength:    Quadriceps grossly intact    Extensor mechanism " intact  Special Tests:    Positive: Patellar grind    Negative: MCL/valgus stress (0 & 30 deg), LCL/varus stress (0 & 30 deg), Lachman's, anterior drawer, posterior drawer, Chai's    Independent visualization of the below image:        Albert Yeo MD, [facility]            Again, thank you for allowing me to participate in the care of your patient.        Sincerely,        Albert Yeo, MD

## 2022-11-19 NOTE — PATIENT INSTRUCTIONS
1. Chronic pain of both knees    2. Patellofemoral pain syndrome of both knees    3. De Quervain's disease (radial styloid tenosynovitis)      -Patient is following up for chronic bilateral knee pain due to patellofemoral syndrome which has flared up when she returned from her trip to [place].  Patient is also complaining of new onset left wrist pain due to a tendinitis from repetitive lifting of her growing child  -Patient reports recently stopping breast-feeding and so we will now start an oral anti-inflammatory medication.  Patient will start meloxicam 15 mg daily for the next 3 to 4 weeks and then on an as-needed basis as pain improves.  Patient may continue with topical Voltaren as needed.  Patient may also take Tylenol for breakthrough pain as well  -Patient will continue with home exercise program.  I do recommend supervision with a therapist to help with her progression of strengthening and stabilization exercises.  Patient will be referred to the physical therapy department here in [place] to work with either Maria Ines or Taiwo.  -Patient may purchase an over-the-counter cock-up wrist brace to support the left wrist as needed.  -Patient will follow up in 6 to 8 weeks if pain does not improve.  To consider potential MRI of the knees at that time.  Patient may also consider a cortisone injection of the left wrist if wrist pain is persistent.  -Call direct clinic number [[phone]] at any time with questions or concerns.    Albert Yeo MD [facility] Orthopedics and Sports Medicine  [facility] [place]

## 2022-11-21 ENCOUNTER — THERAPY VISIT (OUTPATIENT)
Dept: PHYSICAL THERAPY | Facility: CLINIC | Age: 32
End: 2022-11-21
Attending: FAMILY MEDICINE
Payer: COMMERCIAL

## 2022-11-21 DIAGNOSIS — M22.2X2 PATELLOFEMORAL PAIN SYNDROME OF BOTH KNEES: ICD-10-CM

## 2022-11-21 DIAGNOSIS — M22.2X1 PATELLOFEMORAL PAIN SYNDROME OF BOTH KNEES: ICD-10-CM

## 2022-11-21 DIAGNOSIS — M65.4 DE QUERVAIN'S DISEASE (RADIAL STYLOID TENOSYNOVITIS): ICD-10-CM

## 2022-11-21 PROCEDURE — 97110 THERAPEUTIC EXERCISES: CPT | Mod: GP | Performed by: PHYSICAL THERAPIST

## 2022-11-21 ASSESSMENT — ACTIVITIES OF DAILY LIVING (ADL)
GO DOWN STAIRS: ACTIVITY IS VERY DIFFICULT
KNEE_ACTIVITY_OF_DAILY_LIVING_SUM: 40
KNEE_ACTIVITY_OF_DAILY_LIVING_SCORE: 57.14
PAIN: THE SYMPTOM AFFECTS MY ACTIVITY SLIGHTLY
GIVING WAY, BUCKLING OR SHIFTING OF KNEE: I HAVE THE SYMPTOM BUT IT DOES NOT AFFECT MY ACTIVITY
RAW_SCORE: 40
AS_A_RESULT_OF_YOUR_KNEE_INJURY,_HOW_WOULD_YOU_RATE_YOUR_CURRENT_LEVEL_OF_DAILY_ACTIVITY?: NEARLY NORMAL
SWELLING: I HAVE THE SYMPTOM BUT IT DOES NOT AFFECT MY ACTIVITY
RISE FROM A CHAIR: ACTIVITY IS SOMEWHAT DIFFICULT
HOW_WOULD_YOU_RATE_THE_OVERALL_FUNCTION_OF_YOUR_KNEE_DURING_YOUR_USUAL_DAILY_ACTIVITIES?: NEARLY NORMAL
HOW_WOULD_YOU_RATE_THE_CURRENT_FUNCTION_OF_YOUR_KNEE_DURING_YOUR_USUAL_DAILY_ACTIVITIES_ON_A_SCALE_FROM_0_TO_100_WITH_100_BEING_YOUR_LEVEL_OF_KNEE_FUNCTION_PRIOR_TO_YOUR_INJURY_AND_0_BEING_THE_INABILITY_TO_PERFORM_ANY_OF_YOUR_USUAL_DAILY_ACTIVITIES?: 51
WALK: ACTIVITY IS MINIMALLY DIFFICULT
SIT WITH YOUR KNEE BENT: ACTIVITY IS SOMEWHAT DIFFICULT
KNEEL ON THE FRONT OF YOUR KNEE: ACTIVITY IS FAIRLY DIFFICULT
STAND: ACTIVITY IS SOMEWHAT DIFFICULT
STIFFNESS: I HAVE THE SYMPTOM BUT IT DOES NOT AFFECT MY ACTIVITY
WEAKNESS: THE SYMPTOM AFFECTS MY ACTIVITY MODERATELY
SQUAT: ACTIVITY IS VERY DIFFICULT
GO UP STAIRS: ACTIVITY IS VERY DIFFICULT
LIMPING: I DO NOT HAVE THE SYMPTOM

## 2022-11-21 NOTE — PROGRESS NOTES
Subjective:  HPI  Physical Exam       Knee Activity of Daily Living Score: 57.14            Objective:  System    Physical Exam    General     ROS    Assessment/Plan:    PROGRESS  REPORT    Progress reporting period is from  11/21/2022.       SUBJECTIVE   Subjective: Pt notes minimal improvements with knee pain. Not icing. Pain most with squatting and stairs (has 11mo old at home, hard to  from floor). 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     Current Pain level: 4/10.      Initial Pain level: 7/10.   Changes in function:  None  Adverse reaction to treatment or activity: None    OBJECTIVE  Changes noted in objective findings:  None  Objective: Updated KOS today. AROM: B WFL (no limits). Good quad activation. SLR flexion R 4/5 L 4-/5; abd R 4-/5 L 3+/5. Tolerated L[place] taping well today (less pain with stairs).     ASSESSMENT/PLAN  Updated problem list and treatment plan: Diagnosis 1:  B knee pain; patellofemoral pain  Pain -  hot/cold therapy, self management, education and home program  Decreased strength - therapeutic exercise and therapeutic activities  Impaired balance - neuro re-education and therapeutic activities  Decreased proprioception - neuro re-education and therapeutic activities  Inflammation - cold therapy and self management/home program  Impaired muscle performance - neuro re-education  Decreased function - therapeutic activities  Instability -  Therapeutic Activity  Therapeutic Exercise  STG/LTGs have been met or progress has been made towards goals:  None  Assessment of Progress: The patient's condition has potential to improve.  Self Management Plans:  Patient has been instructed in a home treatment program.  I have re-evaluated this patient and find that the nature, scope, duration and intensity of the therapy is appropriate for the medical condition of the patient.  Melba continues to require the following intervention to meet STG and LTG's:  PT    Recommendations:  This patient  would benefit from continued therapy.     Frequency:  2 X a month, once daily  Duration:  for 3 months        Please refer to the daily flowsheet for treatment today, total treatment time and time spent performing 1:1 timed codes.

## 2022-11-21 NOTE — PROGRESS NOTES
[facility]    OUTPATIENT Physical Therapy ORTHOPEDIC EVALUATION  PLAN OF TREATMENT FOR OUTPATIENT REHABILITATION  (COMPLETE FOR INITIAL CLAIMS ONLY)  Patient's Last Name, First Name, M.I.  YOB: 1990  Melba Galvan    Provider s Name:  [facility]   Medical Record No.  [id number]   Start of Care Date:  07/14/22   Onset Date:   07/12/22 (md referral date)   Treatment Diagnosis:  patellofemoral pain syndrome bilateral knees Medical Diagnosis:     De Quervain's disease (radial styloid tenosynovitis)  Patellofemoral pain syndrome of both knees       Goals:     11/21/22 0500   Body Part   Goals listed below are for bilateral knee   Goal #1   Goal #1 squatting/kneeling   Previous Functional Level No restrictions   Current Functional Level Can kneel with pain of level;Can do a partial squat;Can squat with pain of level    Performance Level Pain 7/10   STG Target Performance Kneel on a soft surface;Reduce pain with kneeling to;Reduce pain with squatting to ;Do a partial squat   Performance Level 3/10   Rationale for proper body mechanics while performing housework;for proper body mechanics while performing yardwork and home maintenance;for proper body mechanics when lifting, personal hygiene, dressing   Due date 01/02/23   If goal not met, Why? extended due to poor follow up   LTG Target Performance Do a full squat;Kneel on a soft surface;Reduce pain with kneeling to;Reduce pain with squatting to    Performance Level pain 1/10   Rationale for proper body mechanics while performing housework;for proper body mechanics while performing yardwork and home maintenance;for proper body mechanics when lifting, personal hygiene, dressing   Due date 02/13/23   If goal not met, Why? extended due to poor follow up       Therapy Frequency:  2x/month  Predicted Duration of Therapy  Intervention:  12 weeks    Helen Alcala, PT                 I CERTIFY THE NEED FOR THESE SERVICES FURNISHED UNDER        THIS PLAN OF TREATMENT AND WHILE UNDER MY CARE     (Physician attestation of this document indicates review and certification of the therapy plan).                     Certification Date From:  10/06/22   Certification Date To:  01/17/23    Referring Provider:  Albert Yeo    Initial Assessment        See Epic Evaluation SOC Date: 07/14/22

## 2022-12-31 NOTE — PLAN OF CARE
D: VSS, assessments WDL.  I: Pt received complete discharge paperwork and home medications as filled by discharge pharmacy -tylenol and ibuprofen. Pt was given times of last dose for all discharge medications in writing on discharge medication sheets. Discharge teaching included home medication, pain management activity restrictions, postpartum cares and symptoms of infection.   A: Discharge outcomes on care plan met. Mother states understanding and comfort with self and baby cares.  P: Pt discharged to home. Pt was discharged with baby, and bands checked at time of discharge. Pt was accompanied by , nurse and baby, and left with personal belongings. Home care sent. Pt to follow up with OB per MD order. Pt had no further questions at this time and no unmet needs were identified.     82

## 2023-01-06 ENCOUNTER — THERAPY VISIT (OUTPATIENT)
Dept: PHYSICAL THERAPY | Facility: CLINIC | Age: 33
End: 2023-01-06
Payer: COMMERCIAL

## 2023-01-06 DIAGNOSIS — M22.2X2 PATELLOFEMORAL PAIN SYNDROME OF BOTH KNEES: Primary | ICD-10-CM

## 2023-01-06 DIAGNOSIS — M22.2X1 PATELLOFEMORAL PAIN SYNDROME OF BOTH KNEES: Primary | ICD-10-CM

## 2023-01-06 PROCEDURE — 97112 NEUROMUSCULAR REEDUCATION: CPT | Mod: GP | Performed by: PHYSICAL THERAPIST

## 2023-01-06 PROCEDURE — 97110 THERAPEUTIC EXERCISES: CPT | Mod: GP | Performed by: PHYSICAL THERAPIST

## 2023-01-21 NOTE — PATIENT INSTRUCTIONS
[facility]    Over the counter folic acid start now      Preventive Health Recommendations  Female Ages 26 - 39  Yearly exam:   See your health care provider every year in order to    Review health changes.     Discuss preventive care.      Review your medicines if you your doctor has prescribed any.    Until age 30: Get a Pap test every three years (more often if you have had an abnormal result).    After age 30: Talk to your doctor about whether you should have a Pap test every 3 years or have a Pap test with HPV screening every 5 years.   You do not need a Pap test if your uterus was removed (hysterectomy) and you have not had cancer.  You should be tested each year for STDs (sexually transmitted diseases), if you're at risk.   Talk to your provider about how often to have your cholesterol checked.  If you are at risk for diabetes, you should have a diabetes test (fasting glucose).  Shots: Get a flu shot each year. Get a tetanus shot every 10 years.   Nutrition:     Eat at least 5 servings of fruits and vegetables each day.    Eat whole-grain bread, whole-wheat pasta and brown rice instead of white grains and rice.    Get adequate Calcium and Vitamin D.     Lifestyle    Exercise at least 150 minutes a week (30 minutes a day, 5 days of the week). This will help you control your weight and prevent disease.    Limit alcohol to one drink per day.    No smoking.     Wear sunscreen to prevent skin cancer.    See your dentist every six months for an exam and cleaning.     none

## 2023-01-26 DIAGNOSIS — M22.2X1 PATELLOFEMORAL PAIN SYNDROME OF BOTH KNEES: ICD-10-CM

## 2023-01-26 DIAGNOSIS — M22.2X2 PATELLOFEMORAL PAIN SYNDROME OF BOTH KNEES: ICD-10-CM

## 2023-01-31 RX ORDER — MELOXICAM 15 MG/1
TABLET ORAL
Qty: 30 TABLET | Refills: 1 | OUTPATIENT
Start: 2023-01-31

## 2023-01-31 NOTE — TELEPHONE ENCOUNTER
Phone call to patient. She did not request a refill. She states she has been planning to make a follow up appointment for continued knee and wrist pain.     Appointment scheduled for 2/10/23 at 2:40 with 2:25 pm arrival. She verbalized understanding.     Refill request denied.     GRIFFIN Trejo RN

## 2023-02-09 NOTE — PROGRESS NOTES
ASSESSMENT & PLAN  Patient Instructions     1. Patellofemoral pain syndrome of left knee    2. Patellofemoral pain syndrome of right knee    3. De Quervain's disease (radial styloid tenosynovitis)      -Patient is following up for chronic bilateral knee pain due to inflammation of the patellofemoral joints bilaterally.  Patient also has persistent left wrist pain due to significant tendinitis  -Patient is requesting formal hand therapy.  Hand therapy order was placed today  -Patient reports ongoing daily bilateral knee pain.  Patient states that she watched a movie yesterday and sat crosslegged for 2 hours and had significant exacerbation in her pain requiring her to go to bed early.  Patient reports partial improvement in her pain with physical therapy and home exercises but the symptoms will worsen or return with even short breaks from her exercises  -Patient will get MRIs of bilateral knees for further evaluation of intra-articular injuries versus inflammation.  - Your MRI has been ordered. You may call [phone] to schedule over the phone. Once you get notified on [employer] of your results, send me a [employer] message to discuss results and next of treatment options.  Please call my office 2 days after your MRI is complete to discuss results and next of treatment options.  -Patient is also reporting ongoing neck pain for which she is seeing a chiropractor.  Patient would like evaluation at the next visit.  Patient was advised to get a copy of her cervical x-rays for us to review at her next visit.  We will start formal physical therapy with her current therapist and potentially transfer her to a spine specialist if indicated.  -Call direct clinic number [[phone]] at any time with questions or concerns.    Albert Yeo MD [facility]  [facility] [place]          -----    SUBJECTIVE:  Melba Galvan is a 32 year old female who is seen in follow-up for bilateral knee  pain and left wrist pain.They were last seen 11/19/2022 at which time patient was to continue home exercises and start formal physical therapy. Patient states that she is getting better but has random times of increase pain. patient states her left wrist has continued to get worse.     Since their last visit reports 40% improvement. They indicate that their current pain level is 3/10. They have tried ice, physical therapy (5 visits) and casting/splinting/bracing.      The patient is seen by themselves.    Patient's past medical, surgical, social, and family histories were reviewed today and no changes are noted.    REVIEW OF SYSTEMS:  Constitutional: NEGATIVE for fever, chills, change in weight  Skin: NEGATIVE for worrisome rashes, moles or lesions  GI/: NEGATIVE for bowel or bladder changes  Neuro: NEGATIVE for weakness, dizziness or paresthesias    OBJECTIVE:  /70    General: healthy, alert and in no distress  HEENT: no scleral icterus or conjunctival erythema  Skin: no suspicious lesions or rash. No jaundice.  CV: regular rhythm by palpation, no pedal edema  Resp: normal respiratory effort without conversational dyspnea   Psych: normal mood and affect  Gait: normal steady gait with appropriate coordination and balance  Neuro: normal light touch sensory exam of the extremities.    MSK:  BILATERAL KNEE  Inspection:    normal alignment  Palpation:    Tender about the lateral patellar facet, lateral joint line and medial joint line. Remainder of bony and ligamentous landmarks are nontender.    No effusion is present    Patellofemoral crepitus is Absent  Range of Motion:     00 extension to 1350 flexion  Strength:    Quadriceps grossly intact    Extensor mechanism intact  Special Tests:    Positive: Patellar grind    Negative: MCL/valgus stress (0 & 30 deg), LCL/varus stress (0 & 30 deg), Lachman's, anterior drawer, posterior drawer, Chai's    LEFT WRIST  Inspection:    No swelling, bruising,  discoloration, or obvious deformity or asymmetry  Palpation:    Tender about the 1st dorsal compartment. Remainder of bony and ligamentous line marks are nontender.    Crepitus is Absent    Metacarpals: normal    Thumb: normal    Fingers: normal  Range of Motion:    Full (active and passive) flexion, extension, pronation/supination, and ulnar/radial deviation.  Strength:    No deficits in flexion, extension, ulnar/radial deviation, or  strength.  Special Tests:    Positive: Finkelstein's      Independent visualization of the below image:        Albert Yeo MD, [facility]

## 2023-02-10 ENCOUNTER — OFFICE VISIT (OUTPATIENT)
Dept: ORTHOPEDICS | Facility: CLINIC | Age: 33
End: 2023-02-10
Payer: COMMERCIAL

## 2023-02-10 VITALS — DIASTOLIC BLOOD PRESSURE: 70 MMHG | SYSTOLIC BLOOD PRESSURE: 100 MMHG

## 2023-02-10 DIAGNOSIS — M22.2X1 PATELLOFEMORAL PAIN SYNDROME OF RIGHT KNEE: ICD-10-CM

## 2023-02-10 DIAGNOSIS — M22.2X2 PATELLOFEMORAL PAIN SYNDROME OF LEFT KNEE: Primary | ICD-10-CM

## 2023-02-10 DIAGNOSIS — M65.4 DE QUERVAIN'S DISEASE (RADIAL STYLOID TENOSYNOVITIS): ICD-10-CM

## 2023-02-10 PROCEDURE — 99214 OFFICE O/P EST MOD 30 MIN: CPT | Performed by: FAMILY MEDICINE

## 2023-02-10 NOTE — PATIENT INSTRUCTIONS
1. Patellofemoral pain syndrome of left knee    2. Patellofemoral pain syndrome of right knee    3. De Quervain's disease (radial styloid tenosynovitis)      -Patient is following up for chronic bilateral knee pain due to inflammation of the patellofemoral joints bilaterally.  Patient also has persistent left wrist pain due to significant tendinitis  -Patient is requesting formal hand therapy.  Hand therapy order was placed today  -Patient reports ongoing daily bilateral knee pain.  Patient states that she watched a movie yesterday and sat crosslegged for 2 hours and had significant exacerbation in her pain requiring her to go to bed early.  Patient reports partial improvement in her pain with physical therapy and home exercises but the symptoms will worsen or return with even short breaks from her exercises  -Patient will get MRIs of bilateral knees for further evaluation of intra-articular injuries versus inflammation.  - Your MRI has been ordered. You may call [phone] to schedule over the phone. Once you get notified on [employer] of your results, send me a [employer] message to discuss results and next of treatment options.  Please call my office 2 days after your MRI is complete to discuss results and next of treatment options.  -Patient is also reporting ongoing neck pain for which she is seeing a chiropractor.  Patient would like evaluation at the next visit.  Patient was advised to get a copy of her cervical x-rays for us to review at her next visit.  We will start formal physical therapy with her current therapist and potentially transfer her to a spine specialist if indicated.  -Call direct clinic number [[phone]] at any time with questions or concerns.    Albert Yeo MD [facility]  [facility] [place]

## 2023-02-10 NOTE — LETTER
2/10/2023         RE: Melba Galvan  [address] [place] [address]        Dear Colleague,    Thank you for referring your patient, Melba Galvan, to the [facility] [place]. Please see a copy of my visit note below.    ASSESSMENT & PLAN  Patient Instructions     1. Patellofemoral pain syndrome of left knee    2. Patellofemoral pain syndrome of right knee    3. De Quervain's disease (radial styloid tenosynovitis)      -Patient is following up for chronic bilateral knee pain due to inflammation of the patellofemoral joints bilaterally.  Patient also has persistent left wrist pain due to significant tendinitis  -Patient is requesting formal hand therapy.  Hand therapy order was placed today  -Patient reports ongoing daily bilateral knee pain.  Patient states that she watched a movie yesterday and sat crosslegged for 2 hours and had significant exacerbation in her pain requiring her to go to bed early.  Patient reports partial improvement in her pain with physical therapy and home exercises but the symptoms will worsen or return with even short breaks from her exercises  -Patient will get MRIs of bilateral knees for further evaluation of intra-articular injuries versus inflammation.  - Your MRI has been ordered. You may call [phone] to schedule over the phone. Once you get notified on [employer] of your results, send me a [employer] message to discuss results and next of treatment options.  Please call my office 2 days after your MRI is complete to discuss results and next of treatment options.  -Patient is also reporting ongoing neck pain for which she is seeing a chiropractor.  Patient would like evaluation at the next visit.  Patient was advised to get a copy of her cervical x-rays for us to review at her next visit.  We will start formal physical therapy with her current therapist and potentially transfer her to a spine specialist if indicated.  -Call direct clinic number  [[phone]] at any time with questions or concerns.    Albert Yeo MD [facility]  [facility]          -----    SUBJECTIVE:  Melba Galvan is a 32 year old female who is seen in follow-up for bilateral knee pain and left wrist pain.They were last seen 11/19/2022 at which time patient was to continue home exercises and start formal physical therapy. Patient states that she is getting better but has random times of increase pain. patient states her left wrist has continued to get worse.     Since their last visit reports 40% improvement. They indicate that their current pain level is 3/10. They have tried ice, physical therapy (5 visits) and casting/splinting/bracing.      The patient is seen by themselves.    Patient's past medical, surgical, social, and family histories were reviewed today and no changes are noted.    REVIEW OF SYSTEMS:  Constitutional: NEGATIVE for fever, chills, change in weight  Skin: NEGATIVE for worrisome rashes, moles or lesions  GI/: NEGATIVE for bowel or bladder changes  Neuro: NEGATIVE for weakness, dizziness or paresthesias    OBJECTIVE:  /70    General: healthy, alert and in no distress  HEENT: no scleral icterus or conjunctival erythema  Skin: no suspicious lesions or rash. No jaundice.  CV: regular rhythm by palpation, no pedal edema  Resp: normal respiratory effort without conversational dyspnea   Psych: normal mood and affect  Gait: normal steady gait with appropriate coordination and balance  Neuro: normal light touch sensory exam of the extremities.    MSK:  BILATERAL KNEE  Inspection:    normal alignment  Palpation:    Tender about the lateral patellar facet, lateral joint line and medial joint line. Remainder of bony and ligamentous landmarks are nontender.    No effusion is present    Patellofemoral crepitus is Absent  Range of Motion:     00 extension to 1350 flexion  Strength:    Quadriceps grossly  intact    Extensor mechanism intact  Special Tests:    Positive: Patellar grind    Negative: MCL/valgus stress (0 & 30 deg), LCL/varus stress (0 & 30 deg), Lachman's, anterior drawer, posterior drawer, Chai's    LEFT WRIST  Inspection:    No swelling, bruising, discoloration, or obvious deformity or asymmetry  Palpation:    Tender about the 1st dorsal compartment. Remainder of bony and ligamentous line marks are nontender.    Crepitus is Absent    Metacarpals: normal    Thumb: normal    Fingers: normal  Range of Motion:    Full (active and passive) flexion, extension, pronation/supination, and ulnar/radial deviation.  Strength:    No deficits in flexion, extension, ulnar/radial deviation, or  strength.  Special Tests:    Positive: Finkelstein's      Independent visualization of the below image:        Albert Yeo MD, [facility]            Again, thank you for allowing me to participate in the care of your patient.        Sincerely,        Albert Yeo, MD

## 2023-02-16 ENCOUNTER — HOSPITAL ENCOUNTER (OUTPATIENT)
Dept: MRI IMAGING | Facility: CLINIC | Age: 33
Discharge: HOME | End: 2023-02-16
Attending: FAMILY MEDICINE
Payer: COMMERCIAL

## 2023-02-16 DIAGNOSIS — M22.2X1 PATELLOFEMORAL PAIN SYNDROME OF RIGHT KNEE: ICD-10-CM

## 2023-02-16 DIAGNOSIS — M22.2X2 PATELLOFEMORAL PAIN SYNDROME OF LEFT KNEE: ICD-10-CM

## 2023-02-16 PROCEDURE — 73721 MRI JNT OF LWR EXTRE W/O DYE: CPT | Mod: 26 | Performed by: RADIOLOGY

## 2023-02-16 PROCEDURE — 73721 MRI JNT OF LWR EXTRE W/O DYE: CPT | Mod: 50

## 2023-02-17 ENCOUNTER — MYC MEDICAL ADVICE (OUTPATIENT)
Dept: ORTHOPEDICS | Facility: CLINIC | Age: 33
End: 2023-02-17
Payer: COMMERCIAL

## 2023-02-17 NOTE — TELEPHONE ENCOUNTER
Reason for Call:  Request for results:    Name of test or procedure: MRI bilateral knee    Date of test of procedure: 2/16/2023    Results: MR right knee without contrast 2/17/2023 7:50 AM     Techniques: Multiplanar multisequence imaging of the right knee was  obtained without administration of intra-articular or intravenous  contrast using routine protocol.     History: Chronic knee pain with activity and at rest when knees are in  flexed position.  Eval for inflammation vs injury.  Extensive therapy  and home exercises have improved pain but still has daily pain.;  Patellofemoral pain syndrome of right knee     Comparison: Radiographs 11/19/2022     Findings:     MENISCI:  Medial meniscus: Horizontal tearing of the junction of the posterior  horn and posterior root ligament of the medial meniscus.  Lateral meniscus: Intact.     LIGAMENTS  Cruciate ligaments: Intact.  Medial supporting structures: Intact.  Lateral supporting structures: Intact. Soft tissue edema deep to the  distal iliotibial band.     EXTENSOR MECHANISM  Intact. Edematous suprapatellar fat pad.     FLUID  No joint effusion. No substantial Baker's cyst.     OSSEOUS and ARTICULAR STRUCTURES  Bones: No fracture, contusion, or osseous lesion is seen.     Patellofemoral compartment: No hyaline cartilage disease. Mild  heterogeneity of cartilage of the lateral facet and median ridge.  Increased tibial tuberosity to trochlear groove interval measuring 2.1  cm.     Medial compartment: No hyaline cartilage disease.     Lateral compartment: No hyaline cartilage disease.     ANCILLARY FINDINGS  None.                                                                      Impression:     1. Horizontal tearing of the junction of the posterior horn and  posterior root ligament of the medial meniscus.     2. Edema deep to the distal iliotibial band as can be seen in distal  iliotibial band friction syndrome.     3. Mild patellar chondromalacia.     Narrative &  Impression   MR left knee without contrast 2/17/2023 7:39 AM     Techniques: Multiplanar multisequence imaging of the mild knee was  obtained without administration of intra-articular or intravenous  contrast using routine protocol.     History: Chronic knee pain with activity and at rest when knees are in  flexed position.  Eval for inflammation vs injury.  Extensive therapy  and home exercises have improved pain but still has daily pain;  Patellofemoral pain syndrome of left knee     Comparison: Radiographs 11/19/2022     Findings:     MENISCI:  Medial meniscus: Horizontal tearing of the posterior root ligament.  Lateral meniscus: Intact.     LIGAMENTS  Cruciate ligaments: Intact.  Medial supporting structures: Intact.  Lateral supporting structures: Intact.     EXTENSOR MECHANISM  Intact. Mild edema in the suprapatellar fat pad.     FLUID  No joint effusion. Small Baker's cyst.     OSSEOUS and ARTICULAR STRUCTURES  Bones: No fracture, contusion, or osseous lesion is seen.     Patellofemoral compartment: No hyaline cartilage disease.     Medial compartment: No hyaline cartilage disease.     Lateral compartment: No hyaline cartilage disease.     ANCILLARY FINDINGS  None.                                                                      Impression:     1. Horizontal tearing of the posterior root ligament of the medial  meniscus.     2. Intact lateral meniscus, cruciate ligaments, and medial and lateral  supporting structures.     LIBERTY ELLIS MD (Joe)                      Plan for results from last OV: Please call my office 2 days after your MRI is complete to discuss results and next of treatment options.

## 2023-02-22 NOTE — PROGRESS NOTES
ASSESSMENT & PLAN  Patient Instructions     1. Patellofemoral pain syndrome of left knee    2. Patellofemoral pain syndrome of right knee    3. Peripheral tear of medial meniscus of right knee, unspecified whether old or current tear, subsequent encounter    4. Peripheral tear of medial meniscus of left knee, unspecified whether old or current tear, subsequent encounter    5. De Quervain's disease (radial styloid tenosynovitis)      -Patient is following up for chronic bilateral knee pain mainly due to patellofemoral inflammation and pain.  Patient also has bilateral medial meniscus tears which are currently not symptomatic.  -Patient also has chronic left wrist pain due to tendinitis  -We had an in-depth discussion regarding patient's bilateral knee MRI results which showed bilateral medial meniscus tears.  Patient has mild degenerative changes of the right patellofemoral joint.  All questions were answered  -Patient will continue with physical therapy and home exercise program to strengthen and stabilize her knees.  Patient will continue with modifications of activities as her pain allows  -Patient will also continue with occupational therapy for the left wrist  -We discussed potential PRP treatment in the future if her patellofemoral pain improves but she develops mechanical symptoms or meniscal pains.  We also potentially discussed geniculate nerve block and ablations to decrease pain if it begins to hinder her strengthening exercises  -Call direct clinic number [[phone]] at any time with questions or concerns.    Albert Yeo MD [facility]  [facility]          -----    SUBJECTIVE:  Melba Galvan is a 32 year old female who is seen in follow-up for bilateral knee pain.They were last seen 2/10/2023.  Patient is here to further review her bilateral knee MRIs    Since their last visit reports worsening pain in her bilateral knees. They indicate that their  "current pain level is 4/10, worsens with activity. They have tried physical therapy (5 visits) and previous imaging (MRI bilateral knees), corticosteroid injection (most recent date: 9/13/2022) that provided  2 month(s) of relief..    Notes continued pain in her left wrist as well, she had 1 occupational therapy visit for her wrist and states they found her left hand was much weaker.     The patient is seen by themselves.    Patient's past medical, surgical, social, and family histories were reviewed today and no changes are noted.    REVIEW OF SYSTEMS:  Constitutional: NEGATIVE for fever, chills, change in weight  Skin: NEGATIVE for worrisome rashes, moles or lesions  GI/: NEGATIVE for bowel or bladder changes  Neuro: NEGATIVE for weakness, dizziness or paresthesias    OBJECTIVE:  BP 98/66   Ht 1.702 m (5' 7\")   Wt 68.9 kg (152 lb)   BMI 23.81 kg/m     General: healthy, alert and in no distress  HEENT: no scleral icterus or conjunctival erythema  Skin: no suspicious lesions or rash. No jaundice.  CV: regular rhythm by palpation, no pedal edema  Resp: normal respiratory effort without conversational dyspnea   Psych: normal mood and affect  Gait: normal steady gait with appropriate coordination and balance  Neuro: normal light touch sensory exam of the extremities.    MSK:  BILATERAL KNEE  Inspection:    normal alignment  Palpation:    Tender about the medial and lateral patellar facet (with knee flexed) and right medial joint line. Remainder of bony and ligamentous landmarks are nontender.    No effusion is present    Patellofemoral crepitus is Absent  Range of Motion:     00 extension to 1350 flexion  Strength:    Quadriceps grossly intact    Extensor mechanism intact  Special Tests:    Positive: Patellar grind    Negative: MCL/valgus stress (0 & 30 deg), LCL/varus stress (0 & 30 deg), Lachman's, anterior drawer, posterior drawer, Chai's    LEFT WRIST  Inspection:    No swelling, bruising, discoloration, " or obvious deformity or asymmetry  Palpation:    Tender about the 1st dorsal compartment. Remainder of bony and ligamentous line marks are nontender.    Crepitus is Absent    Metacarpals: normal    Thumb: normal    Fingers: normal  Range of Motion:    Full (active and passive) flexion, extension, pronation/supination, and ulnar/radial deviation.  Strength:    No deficits in flexion, extension, ulnar/radial deviation, or  strength.  Special Tests:    Positive: Finkelstein's    Independent visualization of the below image:    Narrative & Impression   MR left knee without contrast 2/17/2023 7:39 AM     Techniques: Multiplanar multisequence imaging of the mild knee was  obtained without administration of intra-articular or intravenous  contrast using routine protocol.     History: Chronic knee pain with activity and at rest when knees are in  flexed position.  Eval for inflammation vs injury.  Extensive therapy  and home exercises have improved pain but still has daily pain;  Patellofemoral pain syndrome of left knee     Comparison: Radiographs 11/19/2022     Findings:     MENISCI:  Medial meniscus: Horizontal tearing of the posterior root ligament.  Lateral meniscus: Intact.     LIGAMENTS  Cruciate ligaments: Intact.  Medial supporting structures: Intact.  Lateral supporting structures: Intact.     EXTENSOR MECHANISM  Intact. Mild edema in the suprapatellar fat pad.     FLUID  No joint effusion. Small Baker's cyst.     OSSEOUS and ARTICULAR STRUCTURES  Bones: No fracture, contusion, or osseous lesion is seen.     Patellofemoral compartment: No hyaline cartilage disease.     Medial compartment: No hyaline cartilage disease.     Lateral compartment: No hyaline cartilage disease.     ANCILLARY FINDINGS  None.                                                                      Impression:     1. Horizontal tearing of the posterior root ligament of the medial  meniscus.     2. Intact lateral meniscus, cruciate  ligaments, and medial and lateral  supporting structures.     LIBERTY ELLIS MD (Joe)           MR right knee without contrast 2/17/2023 7:50 AM     Techniques: Multiplanar multisequence imaging of the right knee was  obtained without administration of intra-articular or intravenous  contrast using routine protocol.     History: Chronic knee pain with activity and at rest when knees are in  flexed position.  Eval for inflammation vs injury.  Extensive therapy  and home exercises have improved pain but still has daily pain.;  Patellofemoral pain syndrome of right knee     Comparison: Radiographs 11/19/2022     Findings:     MENISCI:  Medial meniscus: Horizontal tearing of the junction of the posterior  horn and posterior root ligament of the medial meniscus.  Lateral meniscus: Intact.     LIGAMENTS  Cruciate ligaments: Intact.  Medial supporting structures: Intact.  Lateral supporting structures: Intact. Soft tissue edema deep to the  distal iliotibial band.     EXTENSOR MECHANISM  Intact. Edematous suprapatellar fat pad.     FLUID  No joint effusion. No substantial Baker's cyst.     OSSEOUS and ARTICULAR STRUCTURES  Bones: No fracture, contusion, or osseous lesion is seen.     Patellofemoral compartment: No hyaline cartilage disease. Mild  heterogeneity of cartilage of the lateral facet and median ridge.  Increased tibial tuberosity to trochlear groove interval measuring 2.1  cm.     Medial compartment: No hyaline cartilage disease.     Lateral compartment: No hyaline cartilage disease.     ANCILLARY FINDINGS  None.                                                                      Impression:     1. Horizontal tearing of the junction of the posterior horn and  posterior root ligament of the medial meniscus.     2. Edema deep to the distal iliotibial band as can be seen in distal  iliotibial band friction syndrome.     3. Mild patellar chondromalacia.     LIBERTY ELLIS MD (Joe)     Patient's  conditions were thoroughly discussed during today's visit with total time spent face-to-face with the patient and documentation being 35 minutes.    Albert Yeo MD, [facility]

## 2023-02-24 ENCOUNTER — THERAPY VISIT (OUTPATIENT)
Dept: PHYSICAL THERAPY | Facility: CLINIC | Age: 33
End: 2023-02-24
Payer: COMMERCIAL

## 2023-02-24 ENCOUNTER — THERAPY VISIT (OUTPATIENT)
Dept: OCCUPATIONAL THERAPY | Facility: CLINIC | Age: 33
End: 2023-02-24
Attending: FAMILY MEDICINE
Payer: COMMERCIAL

## 2023-02-24 DIAGNOSIS — M22.2X1 PATELLOFEMORAL PAIN SYNDROME OF BOTH KNEES: Primary | ICD-10-CM

## 2023-02-24 DIAGNOSIS — M22.2X2 PATELLOFEMORAL PAIN SYNDROME OF BOTH KNEES: Primary | ICD-10-CM

## 2023-02-24 DIAGNOSIS — M65.4 DE QUERVAIN'S DISEASE (RADIAL STYLOID TENOSYNOVITIS): ICD-10-CM

## 2023-02-24 PROCEDURE — 97535 SELF CARE MNGMENT TRAINING: CPT | Mod: GO

## 2023-02-24 PROCEDURE — 97165 OT EVAL LOW COMPLEX 30 MIN: CPT | Mod: GO

## 2023-02-24 PROCEDURE — 97760 ORTHOTIC MGMT&TRAING 1ST ENC: CPT | Mod: GO

## 2023-02-24 PROCEDURE — 97110 THERAPEUTIC EXERCISES: CPT | Mod: GP | Performed by: PHYSICAL THERAPIST

## 2023-02-24 PROCEDURE — 97110 THERAPEUTIC EXERCISES: CPT | Mod: GO

## 2023-02-24 NOTE — PROGRESS NOTES
[facility]    OUTPATIENT Physical Therapy ORTHOPEDIC EVALUATION  PLAN OF TREATMENT FOR OUTPATIENT REHABILITATION  (COMPLETE FOR INITIAL CLAIMS ONLY)  Patient's Last Name, First Name, M.I.  YOB: 1990  Melba Galvan    Provider s Name:  [facility]   Medical Record No.  [id number]   Start of Care Date:  07/14/22   Onset Date:   07/12/22 (md referral date)   Treatment Diagnosis:  patellofemoral pain syndrome bilateral knees Medical Diagnosis:  Patellofemoral pain syndrome of both knees       Goals:     02/24/23 0500   Body Part   Goals listed below are for bilateral knee   Goal #1   Goal #1 squatting/kneeling   Previous Functional Level No restrictions   Current Functional Level Can kneel with pain of level;Can do a partial squat;Can squat with pain of level    Performance Level Pain 5/10   STG Target Performance Kneel on a soft surface;Reduce pain with kneeling to;Reduce pain with squatting to ;Do a partial squat   Performance Level 3/10   Rationale for proper body mechanics while performing housework;for proper body mechanics while performing yardwork and home maintenance;for proper body mechanics when lifting, personal hygiene, dressing   Due date 03/24/23   If goal not met, Why? extended due to poor follow up   LTG Target Performance Do a full squat;Kneel on a soft surface;Reduce pain with kneeling to;Reduce pain with squatting to    Performance Level pain 1/10   Rationale for proper body mechanics while performing housework;for proper body mechanics while performing yardwork and home maintenance;for proper body mechanics when lifting, personal hygiene, dressing   Due date 04/04/23   If goal not met, Why? extended due to poor follow up         Therapy Frequency:  2x/month  Predicted Duration of Therapy Intervention:  12 weeks    Helen Alcala, PT                 I  CERTIFY THE NEED FOR THESE SERVICES FURNISHED UNDER        THIS PLAN OF TREATMENT AND WHILE UNDER MY CARE     (Physician attestation of this document indicates review and certification of the therapy plan).                     Certification Date From:  01/17/23   Certification Date To:  04/04/23    Referring Provider:  Albert Yeo    Initial Assessment        See Epic Evaluation SOC Date: 07/14/22

## 2023-02-24 NOTE — PROGRESS NOTES
[facility]    OUTPATIENT Occupational Therapy ORTHOPEDIC EVALUATION  PLAN OF TREATMENT FOR OUTPATIENT REHABILITATION  (COMPLETE FOR INITIAL CLAIMS ONLY)  Patient's Last Name, First Name, M.I.  YOB: 1990  Melba Galvan    Provider s Name:  [facility]   Medical Record No.  [id number]   Start of Care Date:  02/24/23   Onset Date:   ~4 months ago   Treatment Diagnosis:  L DeQuervains, L wrist pain Medical Diagnosis:  De Quervain's disease (radial styloid tenosynovitis)       Goals:     02/24/23 0500   Goal #1   Goal #1 household chores   Previous Performance Level Independent   Current Functional Task Lift   Current Performance Level high pain   STG Target Perfomance Baby/Child   STG Target Perform Level mild or less pain w/ attempt   Due Date 03/24/23   LTG Target Task/Performance Pain free household chores   Due Date 04/21/23         Therapy Frequency:  1x/wk  Predicted Duration of Therapy Intervention:  8 weeks    Effie Brice OTR                 Dr. Albert Yeo  I CERTIFY THE NEED FOR THESE SERVICES FURNISHED UNDER        THIS PLAN OF TREATMENT AND WHILE UNDER MY CARE     (Physician attestation of this document indicates review and certification of the therapy plan).                     Certification Date From:  02/24/23   Certification Date To:  04/21/23    Referring Provider:  Albert Yeo    Initial Assessment        See Epic Evaluation SOC Date: 02/24/23

## 2023-02-24 NOTE — PROGRESS NOTES
[facility] Initial Evaluation     Current Date: 2/24/2023    Diagnosis: L DeQuervain's tenosynovitis, L wrist pain   DOI: ~4 months ago  Referring Provider: Dr. Albert Yeo    Subjective:  No past medical history on file.   No Known Allergies  Pt reports onset of pain about 4 mos. Ago. Pain is the worst when caring for her 14 month old.   Of note, pt is seeing PT for patellofemoral pain.    Occupational Profile Information:  Right hand dominant  Prior functional level:  independent-shared household chores  Patient reports symptoms of pain and weakness/loss of strength  Previous treatment: Pt has tried various wrist braces  Barriers include:none  Mobility: No difficulty  Transportation: drives  Currently working in normal job without restrictions requiring use of a laptop.    Functional Outcome Measure:   Upper Extremity Functional Index Score:  SCORE:   Column Totals: 44/80:  (A lower score indicates greater disability.)      Objective:  Pain Level (Scale 0-10):   2/24/2023   At Rest Some discomfort on ulnar side   With Use 5-6     Pain Description:  Date 2/24/2023   Location wrist   Pain Quality Sharp   Frequency intermittent     Pain is worst  daytime   Exacerbated by  extremes of wrist motion w/ resistance   Relieved by rest   Progression Gradually worsening      Sensation   WNL throughout all nerve distributions; per patient report    Edema   - none  + mild    ++ moderate    +++ severe    2/24/2023   1st DC +   Radial Styloid -      ROM  Pain Report: - none  + mild    ++ moderate    +++ severe   Thumb 2/24/2023 2/24/2023   AROM (PROM) R L   MP /65 /60   IP /75 /65   RABD full full   PABD full full   Opposition 10 9     Wrist 2/24/2023 2/24/2023   AROM (PROM) R L   Extension 75 75+   Flexion 70 48   RD 20 16   UD 40 40     Strength   (Measured in pounds)  Pain Report: - none  + mild    ++ moderate    +++ severe    2/24/2023 2/24/2023   Trials R L   1  2  3 50 9   Average       Lat Pinch 2/24/2023  2/24/2023   Trials R L   1  2  3 16 5   Average       3 Pt Pinch 2/24/2023 2/24/2023   Trials R L   1  2  3 10 4   Average       Special Tests   Pain Report:  - none    + mild    ++ moderate    +++ severe    2/24/2023   Finkelsteins -   WHAT test +   Resistance to ECRL/ECRB +     Pain Report:  - none    + mild    ++ moderate    +++ severe    2/24/2023   Radial Styloid +   1st DC +   FCR -   Thumb CMC -   PIN Site -   Extensor Wad ++     Assessment:  Patient presents with symptoms consistent with diagnosis of left wrist and hand DeQuervain's tenosynovitis, with conservative intervention. Pt additionally notes pain in the second dorsal compartment, notable for possible intersection syndrome.     Patient's limitations or Problem List includes:  Pain, Decreased ROM/motion, Decreased  and Decreased pinch of the left hand which interferes with the patient's ability to perform Self Care Tasks (dressing, bathing), Work Tasks, Recreational Activities and Household Chores as compared to previous level of function.    Rehab Potential:  Excellent - Return to full activity, no limitations    Patient will benefit from skilled Occupational Therapy to increase ROM,  strength and pinch strength and decrease pain to return to previous activity level and resume normal daily tasks and to reach their rehab potential.    Barriers to Learning:  No barrier    Communication Issues:  Patient appears to be able to clearly communicate and understand verbal and written communication and follow directions correctly.    Chart Review: Chart Review, Brief history including review of medical and/or therapy records relating to the presenting problem and Simple history review with patient    Identified Performance Deficits: bathing/showering, dressing, care of others, home establishment and management, meal preparation and cleanup, shopping, work and leisure activities    Assessment of Occupational Performance:  5 or more Performance  Deficits    Clinical Decision Making (Complexity): Low complexity    Treatment Explanation:  The following has been discussed with the patient:  RX ordered/plan of care  Anticipated outcomes  Possible risks and side effects    Plan:  Frequency:  1 X week, once daily  Duration:  for 8 weeks    Treatment Plan:    Modalities:    US   Therapeutic Exercise:    AROM, Isometrics and Eccentrics  Neuromuscular re-ed:   Kinesiotaping  Manual Techniques:   Joint mobilization, Friction massage and Myofascial release  Orthotic Fabrication:    Static  Self Care:    Self Care Tasks, Ergonomic Considerations and Work Tasks    Discharge Plan:  Achieve all LTG.  Independent in home treatment program.  Reach maximal therapeutic benefit.    Discharge Plan:    Achieve all LTG.  Independent in home treatment program.  Reach maximal therapeutic benefit.    Home Exercise Program:  Use of warmth for pain mgmt.   Use of thumb spica brace during activity.   Gentle wrist and thumb AROM.   FM to 1st and 2nd dorsal compartment.    Next Visit:  Trial US or k-taping for pain mgmt.   Continue manual therapy.   Progress HEP as tolerated  Continue activity mod education

## 2023-02-24 NOTE — PROGRESS NOTES
Subjective:  HPI  Physical Exam                    Objective:  System    Physical Exam    General     ROS    Assessment/Plan:    PROGRESS  REPORT    Progress reporting period is from 2/24/2023.       SUBJECTIVE  Subjective: Pt notes had MRI on both knees, indicated horizontal tear in the meniscus on both sides. Has been mentally hard for her and has noticed more pain in the knees due to the MRI diagnosis. Pt notes sitting prolonged periods is still painful (30 min or more) .     Current Pain level: 5/10.      Initial Pain level: 7/10.   Changes in function:  Yes, improving pain overall, but set back last week  Adverse reaction to treatment or activity: None    OBJECTIVE  Changes noted in objective findings:  Yes, overall when performing exercises, less pain and improved function  Objective: PROM: bilatearl knees: 5-0-140 (no pain). No pain to joint lines bilaterally. Quad activation R=good; L=fair. MMT SLR flexion= R: 4+/5; L:3+/5;  Abd= R: 4/5; L:3+/5. Educated pt on returning to home program and progressing to gym strengthening (machines). Educated pt on valgus deformity at knees may play a part in chronic degeneration in the meniscus.     ASSESSMENT/PLAN  Updated problem list and treatment plan: Diagnosis 1:  B knee pain  Pain -  hot/cold therapy, self management, education and home program  Decreased strength - therapeutic exercise and therapeutic activities  Impaired balance - neuro re-education and therapeutic activities  Decreased proprioception - neuro re-education and therapeutic activities  Inflammation - cold therapy and self management/home program  Impaired muscle performance - neuro re-education  Decreased function - therapeutic activities  Instability -  Therapeutic Activity  Therapeutic Exercise  STG/LTGs have been met or progress has been made towards goals:  Yes (See Goal flow sheet completed today.)  Assessment of Progress: The patient's condition has potential to improve.  Self Management Plans:   Patient has been instructed in a home treatment program.  I have re-evaluated this patient and find that the nature, scope, duration and intensity of the therapy is appropriate for the medical condition of the patient.  Melba continues to require the following intervention to meet STG and LTG's:  PT    Recommendations:  This patient would benefit from continued therapy.     Frequency:  2 X a month, once daily  Duration:  for 3 months        Please refer to the daily flowsheet for treatment today, total treatment time and time spent performing 1:1 timed codes.

## 2023-02-28 ENCOUNTER — OFFICE VISIT (OUTPATIENT)
Dept: ORTHOPEDICS | Facility: CLINIC | Age: 33
End: 2023-02-28
Payer: COMMERCIAL

## 2023-02-28 VITALS
HEIGHT: 67 IN | SYSTOLIC BLOOD PRESSURE: 98 MMHG | WEIGHT: 152 LBS | DIASTOLIC BLOOD PRESSURE: 66 MMHG | BODY MASS INDEX: 23.86 KG/M2

## 2023-02-28 DIAGNOSIS — M65.4 DE QUERVAIN'S DISEASE (RADIAL STYLOID TENOSYNOVITIS): ICD-10-CM

## 2023-02-28 DIAGNOSIS — S83.221D PERIPHERAL TEAR OF MEDIAL MENISCUS OF RIGHT KNEE, UNSPECIFIED WHETHER OLD OR CURRENT TEAR, SUBSEQUENT ENCOUNTER: ICD-10-CM

## 2023-02-28 DIAGNOSIS — S83.222D PERIPHERAL TEAR OF MEDIAL MENISCUS OF LEFT KNEE, UNSPECIFIED WHETHER OLD OR CURRENT TEAR, SUBSEQUENT ENCOUNTER: ICD-10-CM

## 2023-02-28 DIAGNOSIS — M22.2X2 PATELLOFEMORAL PAIN SYNDROME OF LEFT KNEE: Primary | ICD-10-CM

## 2023-02-28 DIAGNOSIS — M22.2X1 PATELLOFEMORAL PAIN SYNDROME OF RIGHT KNEE: ICD-10-CM

## 2023-02-28 DIAGNOSIS — M54.2 CERVICALGIA: ICD-10-CM

## 2023-02-28 PROCEDURE — 99214 OFFICE O/P EST MOD 30 MIN: CPT | Performed by: FAMILY MEDICINE

## 2023-02-28 NOTE — LETTER
2/28/2023         RE: Melba ABREU Mandy  [address] [place] [address]        Dear Colleague,    Thank you for referring your patient, Melba Galvan, to the [facility] [place]. Please see a copy of my visit note below.    ASSESSMENT & PLAN  Patient Instructions     1. Patellofemoral pain syndrome of left knee    2. Patellofemoral pain syndrome of right knee    3. Peripheral tear of medial meniscus of right knee, unspecified whether old or current tear, subsequent encounter    4. Peripheral tear of medial meniscus of left knee, unspecified whether old or current tear, subsequent encounter    5. De Quervain's disease (radial styloid tenosynovitis)      -Patient is following up for chronic bilateral knee pain mainly due to patellofemoral inflammation and pain.  Patient also has bilateral medial meniscus tears which are currently not symptomatic.  -Patient also has chronic left wrist pain due to tendinitis  -We had an in-depth discussion regarding patient's bilateral knee MRI results which showed bilateral medial meniscus tears.  Patient has mild degenerative changes of the right patellofemoral joint.  All questions were answered  -Patient will continue with physical therapy and home exercise program to strengthen and stabilize her knees.  Patient will continue with modifications of activities as her pain allows  -Patient will also continue with occupational therapy for the left wrist  -We discussed potential PRP treatment in the future if her patellofemoral pain improves but she develops mechanical symptoms or meniscal pains.  We also potentially discussed geniculate nerve block and ablations to decrease pain if it begins to hinder her strengthening exercises  -Call direct clinic number [[phone]] at any time with questions or concerns.    Albert Yeo MD [facility]  [facility] [place]          -----    SUBJECTIVE:  Melba ABREU Sukhjinderri is a  "32 year old female who is seen in follow-up for bilateral knee pain.They were last seen 2/10/2023.  Patient is here to further review her bilateral knee MRIs    Since their last visit reports worsening pain in her bilateral knees. They indicate that their current pain level is 4/10, worsens with activity. They have tried physical therapy (5 visits) and previous imaging (MRI bilateral knees), corticosteroid injection (most recent date: 9/13/2022) that provided  2 month(s) of relief..    Notes continued pain in her left wrist as well, she had 1 occupational therapy visit for her wrist and states they found her left hand was much weaker.     The patient is seen by themselves.    Patient's past medical, surgical, social, and family histories were reviewed today and no changes are noted.    REVIEW OF SYSTEMS:  Constitutional: NEGATIVE for fever, chills, change in weight  Skin: NEGATIVE for worrisome rashes, moles or lesions  GI/: NEGATIVE for bowel or bladder changes  Neuro: NEGATIVE for weakness, dizziness or paresthesias    OBJECTIVE:  BP 98/66   Ht 1.702 m (5' 7\")   Wt 68.9 kg (152 lb)   BMI 23.81 kg/m     General: healthy, alert and in no distress  HEENT: no scleral icterus or conjunctival erythema  Skin: no suspicious lesions or rash. No jaundice.  CV: regular rhythm by palpation, no pedal edema  Resp: normal respiratory effort without conversational dyspnea   Psych: normal mood and affect  Gait: normal steady gait with appropriate coordination and balance  Neuro: normal light touch sensory exam of the extremities.    MSK:  BILATERAL KNEE  Inspection:    normal alignment  Palpation:    Tender about the medial and lateral patellar facet (with knee flexed) and right medial joint line. Remainder of bony and ligamentous landmarks are nontender.    No effusion is present    Patellofemoral crepitus is Absent  Range of Motion:     00 extension to 1350 flexion  Strength:    Quadriceps grossly intact    Extensor " mechanism intact  Special Tests:    Positive: Patellar grind    Negative: MCL/valgus stress (0 & 30 deg), LCL/varus stress (0 & 30 deg), Lachman's, anterior drawer, posterior drawer, Chai's    LEFT WRIST  Inspection:    No swelling, bruising, discoloration, or obvious deformity or asymmetry  Palpation:    Tender about the 1st dorsal compartment. Remainder of bony and ligamentous line marks are nontender.    Crepitus is Absent    Metacarpals: normal    Thumb: normal    Fingers: normal  Range of Motion:    Full (active and passive) flexion, extension, pronation/supination, and ulnar/radial deviation.  Strength:    No deficits in flexion, extension, ulnar/radial deviation, or  strength.  Special Tests:    Positive: Finkelstein's    Independent visualization of the below image:    Narrative & Impression   MR left knee without contrast 2/17/2023 7:39 AM     Techniques: Multiplanar multisequence imaging of the mild knee was  obtained without administration of intra-articular or intravenous  contrast using routine protocol.     History: Chronic knee pain with activity and at rest when knees are in  flexed position.  Eval for inflammation vs injury.  Extensive therapy  and home exercises have improved pain but still has daily pain;  Patellofemoral pain syndrome of left knee     Comparison: Radiographs 11/19/2022     Findings:     MENISCI:  Medial meniscus: Horizontal tearing of the posterior root ligament.  Lateral meniscus: Intact.     LIGAMENTS  Cruciate ligaments: Intact.  Medial supporting structures: Intact.  Lateral supporting structures: Intact.     EXTENSOR MECHANISM  Intact. Mild edema in the suprapatellar fat pad.     FLUID  No joint effusion. Small Baker's cyst.     OSSEOUS and ARTICULAR STRUCTURES  Bones: No fracture, contusion, or osseous lesion is seen.     Patellofemoral compartment: No hyaline cartilage disease.     Medial compartment: No hyaline cartilage disease.     Lateral compartment: No hyaline  cartilage disease.     ANCILLARY FINDINGS  None.                                                                      Impression:     1. Horizontal tearing of the posterior root ligament of the medial  meniscus.     2. Intact lateral meniscus, cruciate ligaments, and medial and lateral  supporting structures.     LIBERTY ELLIS MD (Joe)           MR right knee without contrast 2/17/2023 7:50 AM     Techniques: Multiplanar multisequence imaging of the right knee was  obtained without administration of intra-articular or intravenous  contrast using routine protocol.     History: Chronic knee pain with activity and at rest when knees are in  flexed position.  Eval for inflammation vs injury.  Extensive therapy  and home exercises have improved pain but still has daily pain.;  Patellofemoral pain syndrome of right knee     Comparison: Radiographs 11/19/2022     Findings:     MENISCI:  Medial meniscus: Horizontal tearing of the junction of the posterior  horn and posterior root ligament of the medial meniscus.  Lateral meniscus: Intact.     LIGAMENTS  Cruciate ligaments: Intact.  Medial supporting structures: Intact.  Lateral supporting structures: Intact. Soft tissue edema deep to the  distal iliotibial band.     EXTENSOR MECHANISM  Intact. Edematous suprapatellar fat pad.     FLUID  No joint effusion. No substantial Baker's cyst.     OSSEOUS and ARTICULAR STRUCTURES  Bones: No fracture, contusion, or osseous lesion is seen.     Patellofemoral compartment: No hyaline cartilage disease. Mild  heterogeneity of cartilage of the lateral facet and median ridge.  Increased tibial tuberosity to trochlear groove interval measuring 2.1  cm.     Medial compartment: No hyaline cartilage disease.     Lateral compartment: No hyaline cartilage disease.     ANCILLARY FINDINGS  None.                                                                      Impression:     1. Horizontal tearing of the junction of the posterior horn  and  posterior root ligament of the medial meniscus.     2. Edema deep to the distal iliotibial band as can be seen in distal  iliotibial band friction syndrome.     3. Mild patellar chondromalacia.     LIBERTY ELLIS MD (Joe)     Patient's conditions were thoroughly discussed during today's visit with total time spent face-to-face with the patient and documentation being 35 minutes.    Albert Yeo MD, [facility]            Again, thank you for allowing me to participate in the care of your patient.        Sincerely,        Albert Yeo, MD

## 2023-03-01 ENCOUNTER — OFFICE VISIT (OUTPATIENT)
Dept: FAMILY MEDICINE | Facility: CLINIC | Age: 33
End: 2023-03-01
Payer: COMMERCIAL

## 2023-03-01 VITALS
BODY MASS INDEX: 23.42 KG/M2 | HEART RATE: 79 BPM | WEIGHT: 149.2 LBS | SYSTOLIC BLOOD PRESSURE: 104 MMHG | DIASTOLIC BLOOD PRESSURE: 62 MMHG | TEMPERATURE: 97.1 F | RESPIRATION RATE: 16 BRPM | OXYGEN SATURATION: 96 % | HEIGHT: 67 IN

## 2023-03-01 DIAGNOSIS — R35.0 URINARY FREQUENCY: ICD-10-CM

## 2023-03-01 DIAGNOSIS — R53.83 OTHER FATIGUE: ICD-10-CM

## 2023-03-01 DIAGNOSIS — Z11.59 NEED FOR HEPATITIS C SCREENING TEST: ICD-10-CM

## 2023-03-01 DIAGNOSIS — Z13.220 SCREENING FOR HYPERLIPIDEMIA: ICD-10-CM

## 2023-03-01 DIAGNOSIS — Z00.00 ROUTINE GENERAL MEDICAL EXAMINATION AT A HEALTH CARE FACILITY: Primary | ICD-10-CM

## 2023-03-01 PROBLEM — Z34.00 SUPERVISION OF NORMAL IUP (INTRAUTERINE PREGNANCY) IN PRIMIGRAVIDA: Status: RESOLVED | Noted: 2021-05-27 | Resolved: 2023-03-01

## 2023-03-01 PROBLEM — Z36.89 ENCOUNTER FOR TRIAGE IN PREGNANT PATIENT: Status: RESOLVED | Noted: 2021-09-13 | Resolved: 2023-03-01

## 2023-03-01 PROBLEM — Z12.4 SCREENING FOR CERVICAL CANCER: Status: RESOLVED | Noted: 2021-04-12 | Resolved: 2023-03-01

## 2023-03-01 LAB
ALBUMIN SERPL BCG-MCNC: 4.2 G/DL (ref 3.5–5.2)
ALBUMIN UR-MCNC: NEGATIVE MG/DL
ALP SERPL-CCNC: 53 U/L (ref 35–104)
ALT SERPL W P-5'-P-CCNC: 8 U/L (ref 10–35)
AMORPH CRY #/AREA URNS HPF: ABNORMAL /HPF
ANION GAP SERPL CALCULATED.3IONS-SCNC: 12 MMOL/L (ref 7–15)
APPEARANCE UR: CLEAR
AST SERPL W P-5'-P-CCNC: 17 U/L (ref 10–35)
BACTERIA #/AREA URNS HPF: ABNORMAL /HPF
BILIRUB SERPL-MCNC: 0.4 MG/DL
BILIRUB UR QL STRIP: NEGATIVE
BUN SERPL-MCNC: 13.8 MG/DL (ref 6–20)
CALCIUM SERPL-MCNC: 9.5 MG/DL (ref 8.6–10)
CHLORIDE SERPL-SCNC: 104 MMOL/L (ref 98–107)
CHOLEST SERPL-MCNC: 207 MG/DL
COLOR UR AUTO: YELLOW
CREAT SERPL-MCNC: 0.69 MG/DL (ref 0.51–0.95)
DEPRECATED HCO3 PLAS-SCNC: 24 MMOL/L (ref 22–29)
ERYTHROCYTE [DISTWIDTH] IN BLOOD BY AUTOMATED COUNT: 13.1 % (ref 10–15)
FERRITIN SERPL-MCNC: 29 NG/ML (ref 6–175)
GFR SERPL CREATININE-BSD FRML MDRD: >90 ML/MIN/1.73M2
GLUCOSE SERPL-MCNC: 100 MG/DL (ref 70–99)
GLUCOSE UR STRIP-MCNC: NEGATIVE MG/DL
HCT VFR BLD AUTO: 40.5 % (ref 35–47)
HCV AB SERPL QL IA: NONREACTIVE
HDLC SERPL-MCNC: 63 MG/DL
HGB BLD-MCNC: 13.4 G/DL (ref 11.7–15.7)
HGB UR QL STRIP: NEGATIVE
KETONES UR STRIP-MCNC: NEGATIVE MG/DL
LDLC SERPL CALC-MCNC: 130 MG/DL
LEUKOCYTE ESTERASE UR QL STRIP: ABNORMAL
MCH RBC QN AUTO: 28.1 PG (ref 26.5–33)
MCHC RBC AUTO-ENTMCNC: 33.1 G/DL (ref 31.5–36.5)
MCV RBC AUTO: 85 FL (ref 78–100)
NITRATE UR QL: NEGATIVE
NONHDLC SERPL-MCNC: 144 MG/DL
PH UR STRIP: 7 [PH] (ref 5–7)
PLATELET # BLD AUTO: 257 10E3/UL (ref 150–450)
POTASSIUM SERPL-SCNC: 4.1 MMOL/L (ref 3.4–5.3)
PROT SERPL-MCNC: 6.7 G/DL (ref 6.4–8.3)
RBC # BLD AUTO: 4.77 10E6/UL (ref 3.8–5.2)
RBC #/AREA URNS AUTO: ABNORMAL /HPF
SODIUM SERPL-SCNC: 140 MMOL/L (ref 136–145)
SP GR UR STRIP: 1.02 (ref 1–1.03)
SQUAMOUS #/AREA URNS AUTO: ABNORMAL /LPF
TRIGL SERPL-MCNC: 71 MG/DL
TSH SERPL DL<=0.005 MIU/L-ACNC: 0.76 UIU/ML (ref 0.3–4.2)
UROBILINOGEN UR STRIP-ACNC: 0.2 E.U./DL
WBC # BLD AUTO: 5.5 10E3/UL (ref 4–11)
WBC #/AREA URNS AUTO: ABNORMAL /HPF

## 2023-03-01 PROCEDURE — 82728 ASSAY OF FERRITIN: CPT | Performed by: NURSE PRACTITIONER

## 2023-03-01 PROCEDURE — 36415 COLL VENOUS BLD VENIPUNCTURE: CPT | Performed by: NURSE PRACTITIONER

## 2023-03-01 PROCEDURE — 81001 URINALYSIS AUTO W/SCOPE: CPT | Performed by: NURSE PRACTITIONER

## 2023-03-01 PROCEDURE — 85027 COMPLETE CBC AUTOMATED: CPT | Performed by: NURSE PRACTITIONER

## 2023-03-01 PROCEDURE — 99395 PREV VISIT EST AGE 18-39: CPT | Performed by: NURSE PRACTITIONER

## 2023-03-01 PROCEDURE — 80061 LIPID PANEL: CPT | Performed by: NURSE PRACTITIONER

## 2023-03-01 PROCEDURE — 80053 COMPREHEN METABOLIC PANEL: CPT | Performed by: NURSE PRACTITIONER

## 2023-03-01 PROCEDURE — 86803 HEPATITIS C AB TEST: CPT | Performed by: NURSE PRACTITIONER

## 2023-03-01 PROCEDURE — 84443 ASSAY THYROID STIM HORMONE: CPT | Performed by: NURSE PRACTITIONER

## 2023-03-01 ASSESSMENT — ENCOUNTER SYMPTOMS
CONSTIPATION: 0
COUGH: 0
HEMATURIA: 0
NERVOUS/ANXIOUS: 0
ABDOMINAL PAIN: 0
HEMATOCHEZIA: 0
PARESTHESIAS: 0
HEARTBURN: 0
WEAKNESS: 0
NAUSEA: 0
JOINT SWELLING: 0
CHILLS: 0
BREAST MASS: 0
DYSURIA: 0
DIZZINESS: 0
SORE THROAT: 0
SHORTNESS OF BREATH: 0
ARTHRALGIAS: 1
FREQUENCY: 1
FEVER: 0
DIARRHEA: 0
HEADACHES: 1
EYE PAIN: 0
MYALGIAS: 0
PALPITATIONS: 0

## 2023-03-01 ASSESSMENT — PAIN SCALES - GENERAL: PAINLEVEL: NO PAIN (0)

## 2023-03-01 NOTE — PROGRESS NOTES
SUBJECTIVE:   CC: Melba is an 32 year old who presents for preventive health visit.        Healthy Habits:     Getting at least 3 servings of Calcium per day:  Yes    Bi-annual eye exam:  Yes    Dental care twice a year:  Yes    Sleep apnea or symptoms of sleep apnea:  None    Diet:  Low salt, Low fat/cholesterol, Carbohydrate counting and Breakfast skipped    Frequency of exercise:  2-3 days/week    Duration of exercise:  30-45 minutes    Taking medications regularly:  Yes    Medication side effects:  Not applicable    PHQ-2 Total Score: 0    Additional concerns today:  No        Patient states that she has been very tired, having headaches and sluggish for the last month or so.   Has a 15 month old child.   Always feels tired.   No stool changes, weight changes, tremors, or skin/hair changes.   Occasionally has palpitations that she attributes to increase in stress and is seeing a therapist.     Regular periods, last 3 days.  Not having heavy bleeding.       Today's PHQ-2 Score:   PHQ-2 (  [employer]) 3/1/2023   Q1: Little interest or pleasure in doing things 0   Q2: Feeling down, depressed or hopeless 0   PHQ-2 Score 0   PHQ-2 Total Score (12-17 Years)- Positive if 3 or more points; Administer PHQ-A if positive -   Q1: Little interest or pleasure in doing things Not at all   Q2: Feeling down, depressed or hopeless Not at all   PHQ-2 Score 0       Have you ever done Advance Care Planning? (For example, a Health Directive, POLST, or a discussion with a medical provider or your loved ones about your wishes): declines     Social History     Tobacco Use     Smoking status: Former     Packs/day: 0.50     Years: 10.00     Pack years: 5.00     Types: Hookah, Cigarettes     Start date: 2010     Quit date: 2020     Years since quittin.4     Smokeless tobacco: Never   Substance Use Topics     Alcohol use: Never         Alcohol Use 3/1/2023   Prescreen: >3 drinks/day or >7 drinks/week? Not Applicable   AUDIT  SCORE  -       Reviewed orders with patient.  Reviewed health maintenance and updated orders accordingly - Yes  Labs reviewed in EPIC  BP Readings from Last 3 Encounters:   03/01/23 104/62   02/28/23 98/66   02/10/23 100/70    Wt Readings from Last 3 Encounters:   03/01/23 67.7 kg (149 lb 3.2 oz)   02/28/23 68.9 kg (152 lb)   11/19/22 68.9 kg (152 lb)                  No current outpatient medications on file.     No Known Allergies    Breast Cancer Screening:    Breast CA Risk Assessment (FHS-7) 4/9/2021   Do you have a family history of breast, colon, or ovarian cancer? No / Unknown         Patient under 40 years of age: Routine Mammogram Screening not recommended.   Pertinent mammograms are reviewed under the imaging tab.    History of abnormal Pap smear: NO - age 30-65 PAP every 5 years with negative HPV co-testing recommended  PAP / HPV Latest Ref Rng & Units 4/12/2021   PAP (Historical) - NIL   HPV16 NEG:Negative Negative   HPV18 NEG:Negative Negative   HRHPV NEG:Negative Negative     Reviewed and updated as needed this visit by clinical staff   Tobacco  Allergies  Meds  Problems  Med Hx  Surg Hx  Fam Hx          Reviewed and updated as needed this visit by Provider   Tobacco  Allergies  Meds  Problems  Med Hx  Surg Hx  Fam Hx         History reviewed. No pertinent past medical history.   Past Surgical History:   Procedure Laterality Date     NO HISTORY OF SURGERY         Review of Systems   Constitutional: Negative for chills and fever.   HENT: Negative for congestion, ear pain, hearing loss and sore throat.    Eyes: Negative for pain and visual disturbance.   Respiratory: Negative for cough and shortness of breath.    Cardiovascular: Negative for chest pain, palpitations and peripheral edema.   Gastrointestinal: Negative for abdominal pain, constipation, diarrhea, heartburn, hematochezia and nausea.   Breasts:  Negative for tenderness, breast mass and discharge.   Genitourinary: Positive for  "frequency. Negative for dysuria, genital sores, hematuria, pelvic pain, urgency, vaginal bleeding and vaginal discharge.   Musculoskeletal: Positive for arthralgias. Negative for joint swelling and myalgias.   Skin: Negative for rash.   Neurological: Positive for headaches. Negative for dizziness, weakness and paresthesias.   Psychiatric/Behavioral: Negative for mood changes. The patient is not nervous/anxious.      The last month urinating frequently at night and having some urgency.   Hx of UTIs and yeast infections.   No burning or pain with urination.     Working with ortho for joint pains.     Headaches come and go; multifactorial, stress plays a role.      OBJECTIVE:   /62 (BP Location: Right arm, Patient Position: Sitting, Cuff Size: Adult Regular)   Pulse 79   Temp 97.1  F (36.2  C) (Tympanic)   Resp 16   Ht 1.702 m (5' 7\")   Wt 67.7 kg (149 lb 3.2 oz)   LMP 02/26/2023 (Approximate)   SpO2 96%   Breastfeeding No   BMI 23.37 kg/m    Physical Exam  GENERAL: healthy, alert and no distress  EYES: Eyes grossly normal to inspection, PERRL and conjunctivae and sclerae normal  HENT: ear canals and TM's normal, nose and mouth without ulcers or lesions  NECK: no adenopathy, no asymmetry, masses, or scars and thyroid normal to palpation  RESP: lungs clear to auscultation - no rales, rhonchi or wheezes  BREAST: normal without masses, tenderness or nipple discharge and no palpable axillary masses or adenopathy  CV: regular rate and rhythm, normal S1 S2, no S3 or S4, no murmur, click or rub, no peripheral edema and peripheral pulses strong  ABDOMEN: soft, nontender, no hepatosplenomegaly, no masses and bowel sounds normal  MS: no gross musculoskeletal defects noted, no edema  SKIN: no suspicious lesions or rashes  NEURO: Normal strength and tone, mentation intact and speech normal  PSYCH: mentation appears normal, affect normal/bright    Diagnostic Test Results:  Labs reviewed in Epic    ASSESSMENT/PLAN: "   1. Routine general medical examination at a health care facility  Reviewed age appropriate screenings and immunizations.     2. Need for hepatitis C screening test  Discussed; screen.  - Hepatitis C Screen Reflex to HCV RNA Quant and Genotype; Future  - Hepatitis C Screen Reflex to HCV RNA Quant and Genotype    3. Urinary frequency  Hx of UTI's; checking urine today.   - UA Macro with Reflex to Micro and Culture - lab collect; Future  - UA Macro with Reflex to Micro and Culture - lab collect  - UA Microscopic with Reflex to Culture    4. Other fatigue  Will check labs today; if normal likely related to busy lifestyle, increase in stress, interrupted sleep (due to young child).   - CBC with platelets; Future  - Ferritin; Future  - TSH with free T4 reflex; Future  - Comprehensive metabolic panel (BMP + Alb, Alk Phos, ALT, AST, Total. Bili, TP); Future  - CBC with platelets  - Ferritin  - TSH with free T4 reflex  - Comprehensive metabolic panel (BMP + Alb, Alk Phos, ALT, AST, Total. Bili, TP)    5. Screening for hyperlipidemia  Fasting, screen.  Strong family history.   - Lipid panel reflex to direct LDL Fasting; Future  - Lipid panel reflex to direct LDL Fasting        COUNSELING:  Reviewed preventive health counseling, as reflected in patient instructions        She reports that she quit smoking about 2 years ago. Her smoking use included hookah and cigarettes. She started smoking about 12 years ago. She has a 5.00 pack-year smoking history. She has never used smokeless tobacco.          DAKSHA Ritter [facility]

## 2023-03-01 NOTE — PATIENT INSTRUCTIONS
Patient Education   Personalized Prevention Plan  You are due for the preventive services outlined below.  Your care team is available to assist you in scheduling these services.  If you have already completed any of these items, please share that information with your care team to update in your medical record.  Health Maintenance Due   Topic Date Due     ANNUAL REVIEW OF HM ORDERS  Never done     Discuss Advance Care Planning  Never done     Hepatitis B Vaccine (1 of 3 - 3-dose series) Never done     Hepatitis C Screening  Never done        Preventive Health Recommendations  Female Ages 26 - 39  Yearly exam:   See your health care provider every year in order to    Review health changes.     Discuss preventive care.      Review your medicines if you your doctor has prescribed any.    Until age 30: Get a Pap test every three years (more often if you have had an abnormal result).    After age 30: Talk to your doctor about whether you should have a Pap test every 3 years or have a Pap test with HPV screening every 5 years.   You do not need a Pap test if your uterus was removed (hysterectomy) and you have not had cancer.  You should be tested each year for STDs (sexually transmitted diseases), if you're at risk.   Talk to your provider about how often to have your cholesterol checked.  If you are at risk for diabetes, you should have a diabetes test (fasting glucose).  Shots: Get a flu shot each year. Get a tetanus shot every 10 years.   Nutrition:     Eat at least 5 servings of fruits and vegetables each day.    Eat whole-grain bread, whole-wheat pasta and brown rice instead of white grains and rice.    Get adequate Calcium and Vitamin D.     Lifestyle    Exercise at least 150 minutes a week (30 minutes a day, 5 days of the week). This will help you control your weight and prevent disease.    Limit alcohol to one drink per day.    No smoking.     Wear sunscreen to prevent skin cancer.    See your dentist every six  months for an exam and cleaning.

## 2023-03-17 ENCOUNTER — THERAPY VISIT (OUTPATIENT)
Dept: PHYSICAL THERAPY | Facility: CLINIC | Age: 33
End: 2023-03-17
Payer: COMMERCIAL

## 2023-03-17 DIAGNOSIS — M54.2 CERVICALGIA: ICD-10-CM

## 2023-03-17 PROCEDURE — 97161 PT EVAL LOW COMPLEX 20 MIN: CPT | Mod: GP | Performed by: PHYSICAL THERAPIST

## 2023-03-17 PROCEDURE — 97112 NEUROMUSCULAR REEDUCATION: CPT | Mod: GP | Performed by: PHYSICAL THERAPIST

## 2023-03-17 PROCEDURE — 97110 THERAPEUTIC EXERCISES: CPT | Mod: GP | Performed by: PHYSICAL THERAPIST

## 2023-03-17 NOTE — PROGRESS NOTES
Physical Therapy Initial Evaluation  Subjective:  Physical Therapy Initial Evaluation   3/17/2023   Precautions/Restrictions/MD instructions: PT eval and treat   Therapist Impression:   Pt is a 31 y/o female, with 1 year history of neck pain. Pt presents with pain, decreased ROM, poor posture and decreased strength consistent with posture dysfunction. These impairments limit their ability to sitting prolonged periods, and working on laptop. Skilled PT services necessary in order to reduce impairments and improve independent function.    Subjective:   Chief Complaint: neck pain (central); worse with computer work  DOI/onset: 2/24/2023 DOS:  None   Location: neck  Quality: dull/ache  Frequency: constant  Radiates: none   Pain scale: Rest 3/10 Activity 6/10    Sleeping: not affected   Exacerbated by: sitting, computer work  Relieved by: chiro (short term)  Progression: worse   Previous Treatment: chiro Effect of prior treatment: short term    PMH and/or surgical history: none   Imaging: Xray     Occupation:   Job duties: computer    Current HEP/exercise regimen: none  Patient's goals: prevent from getting worse   Medications: none   General health as reported by patient: good   Return to MD: as needed   Red Flags: none    HPI                    Objective:  CERVICAL:    Posture: slight rounded shoulders and forward head      AROM: (Major, Moderate, Minimal or Nil loss)  Movement Loss Chau Mod Min Nil Pain   Protrusion   X     Flexion   X  Tight; no pain   Retraction   X  pulling   Extension   X  Heaviness; no pain   Left Rotation   X  L pain   Right Rotation   X  R pain   Left Side Bending   X  none   Right Side bending   X  None        Repeated movement testing:   Repeated retraction: X10 X 2 sets (improved rotational pain)    Other Tests:  - Supine: Deep Neck Flexor Activation: poor                System    Physical Exam    General     ROS    Assessment/Plan:    Patient is a 32 year old female with  cervical complaints.    Patient has the following significant findings with corresponding treatment plan.                Diagnosis 1:  Neck pain  Pain -  hot/cold therapy, manual therapy, self management, education, directional preference exercise and home program  Decreased ROM/flexibility - manual therapy and therapeutic exercise  Decreased joint mobility - manual therapy and therapeutic exercise  Decreased strength - therapeutic exercise and therapeutic activities  Inflammation - cold therapy and self management/home program  Impaired muscle performance - neuro re-education  Decreased function - therapeutic activities  Impaired posture - neuro re-education  Instability -  Therapeutic Activity  Therapeutic Exercise    Therapy Evaluation Codes:     Cumulative Therapy Evaluation is: Low complexity.    Previous and current functional limitations:  (See Goal Flow Sheet for this information)    Short term and Long term goals: (See Goal Flow Sheet for this information)     Communication ability:  Patient appears to be able to clearly communicate and understand verbal and written communication and follow directions correctly.  Treatment Explanation - The following has been discussed with the patient:   RX ordered/plan of care  Anticipated outcomes  Possible risks and side effects  This patient would benefit from PT intervention to resume normal activities.   Rehab potential is good.    Frequency:  1 X week, once daily  Duration:  for 6 weeks  Discharge Plan:  Achieve all LTG.  Independent in home treatment program.  Reach maximal therapeutic benefit.    Please refer to the daily flowsheet for treatment today, total treatment time and time spent performing 1:1 timed codes.

## 2023-03-17 NOTE — PROGRESS NOTES
[facility]    OUTPATIENT Physical Therapy ORTHOPEDIC EVALUATION  PLAN OF TREATMENT FOR OUTPATIENT REHABILITATION  (COMPLETE FOR INITIAL CLAIMS ONLY)  Patient's Last Name, First Name, M.I.  YOB: 1990  Melba Galvan    Provider s Name:  SEBASTIEN [facility]   Medical Record No.  [id number]   Start of Care Date:  03/17/23   Onset Date:       Treatment Diagnosis:  Neck pain Medical Diagnosis:  Cervicalgia       Goals:     03/17/23 0500   Body Part   Goals listed below are for neck   Goal #2   Goal #2 posture/body mechanics   Previous Functional Level Patient reports fair posture and proper body mechanics   Current Functional Level Poor posture/body mechanics   STG Target Performance Patient able to demonstrate good posture and body mechanics when prompted   Rationale to prevent neck/back pain and avoid injury when lifting/carrying and performing tasks requiring bending   Due Date 04/07/23   LTG Target Performance Patient able to demonstrate good posture and body mechanics without prompting   Rationale to prevent neck/back pain and avoid injury when lifting/carrying and performing tasks requiring bending   Due Date 04/28/23         Therapy Frequency:  1X/week  Predicted Duration of Therapy Intervention:  6 weeks    Helen Alcala, PT                 I CERTIFY THE NEED FOR THESE SERVICES FURNISHED UNDER        THIS PLAN OF TREATMENT AND WHILE UNDER MY CARE     (Physician attestation of this document indicates review and certification of the therapy plan).                     Certification Date From:  03/17/23   Certification Date To:  05/01/23    Referring Provider:  Albert Yeo    Initial Assessment        See Epic Evaluation SOC Date: 03/17/23

## 2023-05-01 NOTE — PROGRESS NOTES
"[facility]  OB/GYN Clinic Note    SUBJECTIVE:                                                   Melba Galvan is a 32 year old  female who presents to clinic today for the following health issue(s):  Patient presents with:  Contraception    Additional information: discuss IUD     HPI:  Patient presents to discuss alternative to condoms for contraception. 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 She has never used contraception other than condoms before. Melba is considering pregnancy in the future.     Patient's last menstrual period was 2023 (exact date)..   Patient is sexually active, .  Using none for contraception.    reports that she quit smoking about 2 years ago. Her smoking use included hookah and cigarettes. She started smoking about 12 years ago. She has a 5.00 pack-year smoking history. She has never used smokeless tobacco.  STD testing offered?  Declined  Health maintenance updated:  yes    Today's PHQ-9 Score:       2023     1:12 PM   PHQ-9 SCORE   PHQ-9 Total Score 0     Today's BAL-7 Score:       2023     1:12 PM   BAL-7 SCORE   Total Score 2       OBJECTIVE:     BP 90/60   Ht 1.702 m (5' 7\")   Wt 69.3 kg (152 lb 12.8 oz)   LMP 2023 (Exact Date)   Breastfeeding No   BMI 23.93 kg/m    Body mass index is 23.93 kg/m .    Exam:  Constitutional:  Appearance: Well nourished, well developed alert, in no acute distress         ASSESSMENT/PLAN:                                                        ICD-10-CM    1. General counseling and advice on contraceptive management  Z30.09         Melba Galvan is a 32 year old  here to discuss options for contraception. Today we discussed various forms of reversible hormonal and nonhormonal contraception. We discussed Nexplanon, IUD, Depo Provera, OCP, patch, ring. For each method we discussed duration, " insertion/implementation of method, discontinuation/removal, and side effects/contraindications.    The patient has confirmed the plan to proceed with the following birth control method: Mirena IUD     Will plan follow-up for IUD placement within first 7 days of cycle and/or no menses since last cycle to be reasonably certain of no pregnancy.     Ann-Marie Andrea MD, MHS  [facility] [place]  05/09/23

## 2023-05-09 ENCOUNTER — OFFICE VISIT (OUTPATIENT)
Dept: OBGYN | Facility: CLINIC | Age: 33
End: 2023-05-09
Payer: COMMERCIAL

## 2023-05-09 VITALS
SYSTOLIC BLOOD PRESSURE: 90 MMHG | BODY MASS INDEX: 23.98 KG/M2 | DIASTOLIC BLOOD PRESSURE: 60 MMHG | WEIGHT: 152.8 LBS | HEIGHT: 67 IN

## 2023-05-09 DIAGNOSIS — Z30.09 GENERAL COUNSELING AND ADVICE ON CONTRACEPTIVE MANAGEMENT: Primary | ICD-10-CM

## 2023-05-09 PROCEDURE — 99212 OFFICE O/P EST SF 10 MIN: CPT | Performed by: STUDENT IN AN ORGANIZED HEALTH CARE EDUCATION/TRAINING PROGRAM

## 2023-05-09 ASSESSMENT — ANXIETY QUESTIONNAIRES
6. BECOMING EASILY ANNOYED OR IRRITABLE: NOT AT ALL
GAD7 TOTAL SCORE: 2
1. FEELING NERVOUS, ANXIOUS, OR ON EDGE: SEVERAL DAYS
5. BEING SO RESTLESS THAT IT IS HARD TO SIT STILL: NOT AT ALL
3. WORRYING TOO MUCH ABOUT DIFFERENT THINGS: SEVERAL DAYS
2. NOT BEING ABLE TO STOP OR CONTROL WORRYING: NOT AT ALL
GAD7 TOTAL SCORE: 2
IF YOU CHECKED OFF ANY PROBLEMS ON THIS QUESTIONNAIRE, HOW DIFFICULT HAVE THESE PROBLEMS MADE IT FOR YOU TO DO YOUR WORK, TAKE CARE OF THINGS AT HOME, OR GET ALONG WITH OTHER PEOPLE: NOT DIFFICULT AT ALL
7. FEELING AFRAID AS IF SOMETHING AWFUL MIGHT HAPPEN: NOT AT ALL

## 2023-05-09 ASSESSMENT — PATIENT HEALTH QUESTIONNAIRE - PHQ9
SUM OF ALL RESPONSES TO PHQ QUESTIONS 1-9: 0
5. POOR APPETITE OR OVEREATING: NOT AT ALL

## 2023-05-15 DIAGNOSIS — Z01.812 PRE-PROCEDURE LAB EXAM: Primary | ICD-10-CM

## 2023-05-18 NOTE — PROGRESS NOTES
[facility]  OB/GYN Clinic Note    INDICATIONS:                                                      Is a pregnancy test required: Yes.  Was it positive or negative?  Negative  Was a consent obtained?  Yes    Melba Galvan is a 32 year old female,   who presents for insertion of an Mirena IUD. The risks, benefits and alternatives of IUD insertion were discussed in detail previously. She also has reviewed the product brochure.  She has elected to go ahead with the insertion  today and her questions were answered. Consent was signed. Her LMP began on 23 and was normal in duration and amount of flow. A pregnancy test was performed today:  Yes      PROCEDURE:                                                      The pelvic exam revealed normal external genitalia. A speculum was inserted into the vagina and the cervix was visualized. The cervix was prepped with Betadine. The uterus sounded to 7.5 cm. A Mirena IUD was then inserted without difficulty. The string was cut to 3 cm.  The patient experienced no cramping.    POST PROCEDURE:                                                      She  tolerated the procedure well. There were no complications. Patient was discharged in stable condition.    Call if severe cramping, fever, abnormal bleeding, abnormal discharge or pelvic pain develop.  Patient was counseled about the chance of irregular bleeding.    Ann-Marie Andrea MD  Clinic Administered Medication Documentation      Intrauterine/Implant Insertion Documentation    Device was placed by provider (please see MAR for given by information). Please see MAR and medication order for additional information.     Type: Mirena  Remove/Replace by: 2031

## 2023-05-19 ENCOUNTER — LAB (OUTPATIENT)
Dept: LAB | Facility: CLINIC | Age: 33
End: 2023-05-19
Payer: COMMERCIAL

## 2023-05-19 ENCOUNTER — OFFICE VISIT (OUTPATIENT)
Dept: OBGYN | Facility: CLINIC | Age: 33
End: 2023-05-19
Payer: COMMERCIAL

## 2023-05-19 VITALS
HEIGHT: 67 IN | BODY MASS INDEX: 23.98 KG/M2 | WEIGHT: 152.8 LBS | SYSTOLIC BLOOD PRESSURE: 92 MMHG | DIASTOLIC BLOOD PRESSURE: 60 MMHG

## 2023-05-19 DIAGNOSIS — Z30.430 ENCOUNTER FOR INSERTION OF INTRAUTERINE CONTRACEPTIVE DEVICE: Primary | ICD-10-CM

## 2023-05-19 DIAGNOSIS — Z01.812 PRE-PROCEDURE LAB EXAM: ICD-10-CM

## 2023-05-19 LAB — HCG UR QL: NEGATIVE

## 2023-05-19 PROCEDURE — 81025 URINE PREGNANCY TEST: CPT

## 2023-05-19 PROCEDURE — 58300 INSERT INTRAUTERINE DEVICE: CPT | Performed by: STUDENT IN AN ORGANIZED HEALTH CARE EDUCATION/TRAINING PROGRAM

## 2023-05-23 ENCOUNTER — MYC MEDICAL ADVICE (OUTPATIENT)
Dept: OBGYN | Facility: CLINIC | Age: 33
End: 2023-05-23

## 2023-06-04 NOTE — PATIENT INSTRUCTIONS
1. Patellofemoral pain syndrome of left knee    2. Patellofemoral pain syndrome of right knee    3. Peripheral tear of medial meniscus of right knee, unspecified whether old or current tear, subsequent encounter    4. Peripheral tear of medial meniscus of left knee, unspecified whether old or current tear, subsequent encounter    5. De Quervain's disease (radial styloid tenosynovitis)      -Patient is following up for chronic bilateral knee pain mainly due to patellofemoral inflammation and pain.  Patient also has bilateral medial meniscus tears which are currently not symptomatic.  -Patient also has chronic left wrist pain due to tendinitis  -We had an in-depth discussion regarding patient's bilateral knee MRI results which showed bilateral medial meniscus tears.  Patient has mild degenerative changes of the right patellofemoral joint.  All questions were answered  -Patient will continue with physical therapy and home exercise program to strengthen and stabilize her knees.  Patient will continue with modifications of activities as her pain allows  -Patient will also continue with occupational therapy for the left wrist  -We discussed potential PRP treatment in the future if her patellofemoral pain improves but she develops mechanical symptoms or meniscal pains.  We also potentially discussed geniculate nerve block and ablations to decrease pain if it begins to hinder her strengthening exercises  -Call direct clinic number [[phone]] at any time with questions or concerns.    Albert Yeo MD [facility]  [facility] [place]        
Left arm;

## 2023-06-30 ENCOUNTER — THERAPY VISIT (OUTPATIENT)
Dept: PHYSICAL THERAPY | Facility: CLINIC | Age: 33
End: 2023-06-30
Payer: COMMERCIAL

## 2023-06-30 DIAGNOSIS — M22.2X1 PATELLOFEMORAL PAIN SYNDROME OF BOTH KNEES: Primary | ICD-10-CM

## 2023-06-30 DIAGNOSIS — M22.2X2 PATELLOFEMORAL PAIN SYNDROME OF BOTH KNEES: Primary | ICD-10-CM

## 2023-06-30 PROCEDURE — 97530 THERAPEUTIC ACTIVITIES: CPT | Mod: GP | Performed by: PHYSICAL THERAPIST

## 2023-06-30 PROCEDURE — 97161 PT EVAL LOW COMPLEX 20 MIN: CPT | Mod: GP | Performed by: PHYSICAL THERAPIST

## 2023-06-30 PROCEDURE — 97110 THERAPEUTIC EXERCISES: CPT | Mod: GP | Performed by: PHYSICAL THERAPIST

## 2023-06-30 ASSESSMENT — ACTIVITIES OF DAILY LIVING (ADL)
AS_A_RESULT_OF_YOUR_KNEE_INJURY,_HOW_WOULD_YOU_RATE_YOUR_CURRENT_LEVEL_OF_DAILY_ACTIVITY?: ABNORMAL
STAND: ACTIVITY IS FAIRLY DIFFICULT
GO UP STAIRS: ACTIVITY IS VERY DIFFICULT
HOW_WOULD_YOU_RATE_THE_OVERALL_FUNCTION_OF_YOUR_KNEE_DURING_YOUR_USUAL_DAILY_ACTIVITIES?: ABNORMAL
KNEE_ACTIVITY_OF_DAILY_LIVING_SUM: 31
RISE FROM A CHAIR: ACTIVITY IS FAIRLY DIFFICULT
STIFFNESS: THE SYMPTOM AFFECTS MY ACTIVITY SLIGHTLY
SQUAT: I AM UNABLE TO DO THE ACTIVITY
PAIN: THE SYMPTOM AFFECTS MY ACTIVITY SEVERELY
WALK: ACTIVITY IS SOMEWHAT DIFFICULT
GIVING WAY, BUCKLING OR SHIFTING OF KNEE: I DO NOT HAVE THE SYMPTOM
KNEEL ON THE FRONT OF YOUR KNEE: I AM UNABLE TO DO THE ACTIVITY
WEAKNESS: THE SYMPTOM AFFECTS MY ACTIVITY MODERATELY
GO DOWN STAIRS: ACTIVITY IS VERY DIFFICULT
SIT WITH YOUR KNEE BENT: ACTIVITY IS VERY DIFFICULT
RAW_SCORE: 31
LIMPING: I DO NOT HAVE THE SYMPTOM
SWELLING: I DO NOT HAVE THE SYMPTOM
HOW_WOULD_YOU_RATE_THE_CURRENT_FUNCTION_OF_YOUR_KNEE_DURING_YOUR_USUAL_DAILY_ACTIVITIES_ON_A_SCALE_FROM_0_TO_100_WITH_100_BEING_YOUR_LEVEL_OF_KNEE_FUNCTION_PRIOR_TO_YOUR_INJURY_AND_0_BEING_THE_INABILITY_TO_PERFORM_ANY_OF_YOUR_USUAL_DAILY_ACTIVITIES?: 65
KNEE_ACTIVITY_OF_DAILY_LIVING_SCORE: 44.29

## 2023-06-30 NOTE — PROGRESS NOTES
PHYSICAL THERAPY EVALUATION  Type of Visit: Evaluation    See electronic medical record for Abuse and Falls Screening details.    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, 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  Hobbies/Interests: walking (2hrs/day); want to be able to HIT, jump, squats    Patient goals for therapy: stairs    Pain assessment: Pain present  Location: bilateral knee/Ratin/10 constant pain     Objective     KNEE:      Functional:   - Squat: pain bilaterally                               AROM: (* indicates patient's pain)   PROM:(over pressure)   L  R L R   Hyperextension     5 5   Extension   0 0 0 0   Flexion   130* 130 130 130     Strength:   L R   HIP     Flex 3+/5* 3+/5*   Ext 4-/5 4-/5   ABd 3+/5 3+/5   KNEE       QS pain bilaterally, good activation     Special tests:   L R   Sweep Test - -       Assessment & Plan   CLINICAL IMPRESSIONS   Medical Diagnosis: patellofemoral pain    Treatment Diagnosis: patellofemoral pain   Impression/Assessment: Patient is a 32 year old female with B knee complaints.  The following significant findings have been identified: Pain, Decreased strength, Impaired balance, Decreased proprioception, Inflammation, Impaired gait and Impaired muscle performance. These impairments interfere with their ability to perform self care tasks, recreational activities and household chores as compared to previous level of function.     Clinical  Decision Making (Complexity):   Clinical Presentation: Stable/Uncomplicated  Clinical Presentation Rationale: based on medical and personal factors listed in PT evaluation  Clinical Decision Making (Complexity): Low complexity    PLAN OF CARE  Treatment Interventions:  Interventions: Manual Therapy, Neuromuscular Re-education, Therapeutic Activity, Therapeutic Exercise    Long Term Goals     PT Goal 1  Goal Identifier: Ga[place] 1  Goal Description: Pt will be able to go up and down stairs, without a railing, painfree  Rationale: to maximize safety and independence within the home  Target Date: 09/22/23      Frequency of Treatment: 1X/week  Duration of Treatment: 12 weeks      Education Assessment:   Learner/Method: Patient;Pictures/Video    Risks and benefits of evaluation/treatment have been explained.   Patient/Family/caregiver agrees with Plan of Care.     Evaluation Time:     PT Eval, Low Complexity Minutes (64835): 15      Signing Clinician: TRACIE Snow [facility]                                                                                   OUTPATIENT PHYSICAL THERAPY      PLAN OF TREATMENT FOR OUTPATIENT REHABILITATION   Patient's Last Name, First Name, M.I.  Mandy,Melba  G YOB: 1990   Provider's Name   [facility]   Medical Record No.  [id number]     Onset Date: 03/30/23  Start of Care Date: 06/30/23     Medical Diagnosis:  patellofemoral pain      PT Treatment Diagnosis:  patellofemoral pain Plan of Treatment  Frequency/Duration: 1X/week/ 12 weeks    Certification date from 06/30/23 to 09/27/23         See note for plan of treatment details and functional goals     Helen Alcala, PT                         I CERTIFY THE NEED FOR THESE SERVICES FURNISHED UNDER        THIS PLAN OF TREATMENT AND WHILE UNDER MY CARE     (Physician attestation of this document indicates review and certification of the therapy plan).                   Referring Provider:  Albert Yeo      Initial Assessment  See Epic Evaluation- Start of Care Date: 06/30/23

## 2023-07-11 ENCOUNTER — THERAPY VISIT (OUTPATIENT)
Dept: PHYSICAL THERAPY | Facility: CLINIC | Age: 33
End: 2023-07-11
Payer: COMMERCIAL

## 2023-07-11 DIAGNOSIS — M22.2X1 PATELLOFEMORAL PAIN SYNDROME OF BOTH KNEES: Primary | ICD-10-CM

## 2023-07-11 DIAGNOSIS — M22.2X2 PATELLOFEMORAL PAIN SYNDROME OF BOTH KNEES: Primary | ICD-10-CM

## 2023-07-11 PROCEDURE — 97110 THERAPEUTIC EXERCISES: CPT | Mod: GP | Performed by: PHYSICAL THERAPIST

## 2023-08-04 NOTE — PROGRESS NOTES
"[facility]  OB/GYN Clinic Note    SUBJECTIVE:                                                   Melba Galvan is a 32 year old  female who presents to clinic today for the following health issue(s):  Patient presents with:  IUD: String check      HPI:  -Melba reports initial increased cramping and mood irritation following IUD placement on  that has since resolved. She reports irregular bleeding consistent with her previous period and mild spotting throughout the month that is only noticeable when wiping. She has not checked the IUD strings at home. No other additional concerns.     No LMP recorded. (Menstrual status: IUD)..   Patient is sexually active, .  Using IUD for contraception. Mirena inserted 23   reports that she quit smoking about 2 years ago. Her smoking use included hookah and cigarettes. She started smoking about 12 years ago. She has a 5.00 pack-year smoking history. She has never used smokeless tobacco.  Health maintenance: reviewed    Today's PHQ-2 Score:       2023     1:59 PM   PHQ-2 (  [employer])   Q1: Little interest or pleasure in doing things 0   Q2: Feeling down, depressed or hopeless 0   PHQ-2 Score 0   Q1: Little interest or pleasure in doing things Not at all   Q2: Feeling down, depressed or hopeless Not at all   PHQ-2 Score 0       OBJECTIVE:     /64   Ht 1.702 m (5' 7\")   Wt 67.6 kg (149 lb)   BMI 23.34 kg/m    Body mass index is 23.34 kg/m .    Exam:  Constitutional:  Appearance: Well nourished, well developed alert, in no acute distress  Psychiatric:  Mentation appears normal and affect normal/bright.  Pelvic Exam:  External Genitalia:     Normal appearance for age, no discharge present, no tenderness present, no inflammatory lesions present, color normal  Vagina:    Normal vaginal vault without central or paravaginal defects, no discharge present, no inflammatory lesions present, no masses present. Blood present throughout " vaginal vault.   Bladder:     Nontender to palpation  Urethra:   Urethral Body:  Urethra palpation normal, urethra structural support normal   Urethral Meatus:  No erythema or lesions present  Cervix:     Appearance healthy, no lesions present, nontender to palpation, blood present, IUD strings present      ASSESSMENT/PLAN:                                                        ICD-10-CM    1. IUD check up  Z30.431       2. Breakthrough bleeding associated with intrauterine device (IUD)  N92.1     Z97.5           Melba Galvan is a 32 year old  here for IUD check-up IUD strings visualized. Strings were visualized and patient was counseled that menstruation can be irregular for the first few months following insertion of Mirena. She was instructed on how to check for strings at home and advised on NSAID treatment for breakthrough bleeding. Discussed option of OCP as well, which she declines. If it is an ongoing issue, recommend US to check for malpositioned IUD as this can result in irregular bleeding. Follow-up as needed.         Patient Instructions   Please begin taking 800mg of Ibuprofen three times a day for 5 days. You can can continue this regimen for up to 10 days total. If your bleeding is not resolved, please reach out to the clinic to discuss alternative treatment.        Ronn Malone, MS3    I was present with the medical student who participated in the service and in the documentation of the note. I have verified the history and personally performed the physical exam and medical decision making. I agree with the assessment and plan of care as documented in the note.      Ann-Marie Andrea MD, S  [facility] [place]  23

## 2023-08-07 ENCOUNTER — OFFICE VISIT (OUTPATIENT)
Dept: OBGYN | Facility: CLINIC | Age: 33
End: 2023-08-07
Payer: COMMERCIAL

## 2023-08-07 VITALS
BODY MASS INDEX: 23.39 KG/M2 | DIASTOLIC BLOOD PRESSURE: 64 MMHG | HEIGHT: 67 IN | SYSTOLIC BLOOD PRESSURE: 102 MMHG | WEIGHT: 149 LBS

## 2023-08-07 DIAGNOSIS — N92.1 BREAKTHROUGH BLEEDING ASSOCIATED WITH INTRAUTERINE DEVICE (IUD): ICD-10-CM

## 2023-08-07 DIAGNOSIS — Z30.431 IUD CHECK UP: Primary | ICD-10-CM

## 2023-08-07 DIAGNOSIS — Z97.5 BREAKTHROUGH BLEEDING ASSOCIATED WITH INTRAUTERINE DEVICE (IUD): ICD-10-CM

## 2023-08-07 PROCEDURE — 99213 OFFICE O/P EST LOW 20 MIN: CPT | Performed by: STUDENT IN AN ORGANIZED HEALTH CARE EDUCATION/TRAINING PROGRAM

## 2023-08-07 RX ORDER — MULTIPLE VITAMINS W/ MINERALS TAB 9MG-400MCG
1 TAB ORAL DAILY
COMMUNITY
End: 2024-10-01

## 2023-08-07 NOTE — PATIENT INSTRUCTIONS
Please begin taking 800mg of Ibuprofen three times a day for 5 days. You can can continue this regimen for up to 10 days total. If your bleeding is not resolved, please reach out to the clinic to discuss alternative treatment.

## 2023-09-15 ENCOUNTER — OFFICE VISIT (OUTPATIENT)
Dept: ORTHOPEDICS | Facility: CLINIC | Age: 33
End: 2023-09-15
Payer: COMMERCIAL

## 2023-09-15 VITALS — WEIGHT: 149 LBS | BODY MASS INDEX: 23.34 KG/M2 | DIASTOLIC BLOOD PRESSURE: 64 MMHG | SYSTOLIC BLOOD PRESSURE: 110 MMHG

## 2023-09-15 DIAGNOSIS — M25.562 ACUTE PAIN OF LEFT KNEE: ICD-10-CM

## 2023-09-15 DIAGNOSIS — M22.2X2 PATELLOFEMORAL PAIN SYNDROME OF BOTH KNEES: Primary | ICD-10-CM

## 2023-09-15 DIAGNOSIS — M22.2X1 PATELLOFEMORAL PAIN SYNDROME OF BOTH KNEES: Primary | ICD-10-CM

## 2023-09-15 PROCEDURE — 99214 OFFICE O/P EST MOD 30 MIN: CPT | Performed by: STUDENT IN AN ORGANIZED HEALTH CARE EDUCATION/TRAINING PROGRAM

## 2023-09-15 RX ORDER — MELOXICAM 15 MG/1
15 TABLET ORAL DAILY
Qty: 30 TABLET | Refills: 1 | Status: SHIPPED | OUTPATIENT
Start: 2023-09-15 | End: 2023-12-11

## 2023-09-15 ASSESSMENT — PAIN SCALES - GENERAL: PAINLEVEL: EXTREME PAIN (8)

## 2023-09-15 NOTE — PROGRESS NOTES
ASSESSMENT & PLAN    Melba was seen today for pain and pain.    Diagnoses and all orders for this visit:    Patellofemoral pain syndrome of both knees  -     meloxicam (MOBIC) 15 MG tablet; Take 1 tablet (15 mg) by mouth daily    Acute pain of left knee      33-year-old female with history of bilateral patellofemoral syndrome presents with increased anterior knee pain left greater than right for the past 2 weeks after doing a lot of walking on vacation.  She does have significant pain with patellar grind test that reproduces her symptoms consistent with a flare of her patellofemoral syndrome.    Plan:  - Provided with a prescription for meloxicam 15 mg.  Plan to take daily for 7 to 10 days with food to help with pain  - Can trial a patellofemoral brace such as the DonJoy reaction web or try kinesiotaping to help offload patella  -Discussed use of arch supports in her shoes and also could consider wearing arch supports at home instead of going barefoot  - Activity modification as needed, encouraged to continue her home exercise program provided by physical therapy  -Patient declined corticosteroid injection today, however she does have an appointment with Dr. Yeo in a few weeks, and if her symptoms have not improved by then, could consider it at that time    Return for Follow up.      Dr. Leah Moon, DO  [facility]  Sports Medicine     -----  Chief Complaint   Patient presents with    Right Knee - Pain    Left Knee - Pain       SUBJECTIVE  Melba Galvan is a/an 33 year old female who is seen in follow-up for evaluation of Bilateral knee pain Left > Right. Since her last visit, her knees have felt pretty good, however, 3 weeks ago she reports a rapid increase in knee pain symptoms in the left knee especially, 8/10 pain in the left compared to 3/10 in the right. She believes it was from an increased walking/ while carrying her infant while on vacation. She has been doing physical  therapy every 3 weeks, and doing home exercises.  This pain is the same as her prior patellofemoral pain, just more severe in nature.  She reports she has had good results with taking meloxicam in the past for prior flares.      The patient is seen alone      REVIEW OF SYSTEMS:  Pertinent positives/negative: As stated above in HPI    OBJECTIVE:  /64   Wt 67.6 kg (149 lb)   BMI 23.34 kg/m     General: Alert and in no distress  Skin: no visable rashes  CV: Extremities appear well perfused   Resp: normal respiratory effort, no conversational dyspnea   Psych: normal mood, affect  Gait: NORMAL  MSK:  LEFT KNEE  Inspection:    Normal alignment; no edema, erythema, or ecchymosis present  Palpation:    Tender about the lateral patellar facet. Remainder of bony and ligamentous landmarks are nontender.    No effusion is present    Patellofemoral crepitus is Present  Range of Motion:     00 extension to 1350 flexion  Strength:  5/5 knee extension and flexion    Extensor mechanism intact  Special Tests:    Positive: Patellar grind, patellar apprehension    Negative: MCL/valgus stress (0 & 30 deg), LCL/varus stress (0 & 30 deg)      RADIOLOGY:  Final results and radiologist's interpretation available in the [facility] health record.  Images below were personally reviewed and discussed with the patient in the office today.  MR left knee without contrast 2/17/2023 7:39 AM     Techniques: Multiplanar multisequence imaging of the mild knee was  obtained without administration of intra-articular or intravenous  contrast using routine protocol.     History: Chronic knee pain with activity and at rest when knees are in  flexed position.  Eval for inflammation vs injury.  Extensive therapy  and home exercises have improved pain but still has daily pain;  Patellofemoral pain syndrome of left knee     Comparison: Radiographs 11/19/2022  Findings:  MENISCI:  Medial meniscus: Horizontal tearing of the posterior root ligament.  Lateral  meniscus: Intact.     LIGAMENTS  Cruciate ligaments: Intact.  Medial supporting structures: Intact.  Lateral supporting structures: Intact.     EXTENSOR MECHANISM  Intact. Mild edema in the suprapatellar fat pad.  FLUID  No joint effusion. Small Baker's cyst.     OSSEOUS and ARTICULAR STRUCTURES  Bones: No fracture, contusion, or osseous lesion is seen.  Patellofemoral compartment: No hyaline cartilage disease.  Medial compartment: No hyaline cartilage disease.  Lateral compartment: No hyaline cartilage disease.     ANCILLARY FINDINGS  None.                                                                  Impression:  1. Horizontal tearing of the posterior root ligament of the medial  meniscus.  2. Intact lateral meniscus, cruciate ligaments, and medial and lateral  supporting structures.     LIBERTY ELLIS MD (Joe)     Review of prior external note(s) from - Dr. Yeo  Review of the result(s) of each unique test - L knee MRI

## 2023-09-15 NOTE — PATIENT INSTRUCTIONS
-Start Mobic 15mg daily for 7-10 days  -Trial DonJoy reaction web or kinesotape as needed  -Try wearing house shoes with arch supports  -Follow up as scheduled with Dr. Yeo

## 2023-09-20 NOTE — LETTER
9/15/2023         RE: Melba Galvan  [address] [place] [address]        Dear Colleague,    Thank you for referring your patient, Melba Galvan, to the [facility] [place]. Please see a copy of my visit note below.    ASSESSMENT & PLAN    Melba was seen today for pain and pain.    Diagnoses and all orders for this visit:    Patellofemoral pain syndrome of both knees  -     meloxicam (MOBIC) 15 MG tablet; Take 1 tablet (15 mg) by mouth daily    Acute pain of left knee      33-year-old female with history of bilateral patellofemoral syndrome presents with increased anterior knee pain left greater than right for the past 2 weeks after doing a lot of walking on vacation.  She does have significant pain with patellar grind test that reproduces her symptoms consistent with a flare of her patellofemoral syndrome.    Plan:  - Provided with a prescription for meloxicam 15 mg.  Plan to take daily for 7 to 10 days with food to help with pain  - Can trial a patellofemoral brace such as the DonJoy reaction web or try kinesiotaping to help offload patella  -Discussed use of arch supports in her shoes and also could consider wearing arch supports at home instead of going barefoot  - Activity modification as needed, encouraged to continue her home exercise program provided by physical therapy  -Patient declined corticosteroid injection today, however she does have an appointment with Dr. Yeo in a few weeks, and if her symptoms have not improved by then, could consider it at that time    Return for Follow up.      Dr. Leah Moon, DO  [facility]  Sports Medicine     -----  Chief Complaint   Patient presents with     Right Knee - Pain     Left Knee - Pain       SUBJECTIVE  Melba Galvan is a/an 33 year old female who is seen in follow-up for evaluation of Bilateral knee pain Left > Right. Since her last visit, her knees have felt pretty good, however, 3 weeks  ago she reports a rapid increase in knee pain symptoms in the left knee especially, 8/10 pain in the left compared to 3/10 in the right. She believes it was from an increased walking/ while carrying her infant while on vacation. She has been doing physical therapy every 3 weeks, and doing home exercises.  This pain is the same as her prior patellofemoral pain, just more severe in nature.  She reports she has had good results with taking meloxicam in the past for prior flares.      The patient is seen alone      REVIEW OF SYSTEMS:  Pertinent positives/negative: As stated above in HPI    OBJECTIVE:  /64   Wt 67.6 kg (149 lb)   BMI 23.34 kg/m     General: Alert and in no distress  Skin: no visable rashes  CV: Extremities appear well perfused   Resp: normal respiratory effort, no conversational dyspnea   Psych: normal mood, affect  Gait: NORMAL  MSK:  LEFT KNEE  Inspection:    Normal alignment; no edema, erythema, or ecchymosis present  Palpation:    Tender about the lateral patellar facet. Remainder of bony and ligamentous landmarks are nontender.    No effusion is present    Patellofemoral crepitus is Present  Range of Motion:     00 extension to 1350 flexion  Strength:  5/5 knee extension and flexion    Extensor mechanism intact  Special Tests:    Positive: Patellar grind, patellar apprehension    Negative: MCL/valgus stress (0 & 30 deg), LCL/varus stress (0 & 30 deg)      RADIOLOGY:  Final results and radiologist's interpretation available in the [facility] health record.  Images below were personally reviewed and discussed with the patient in the office today.  MR left knee without contrast 2/17/2023 7:39 AM     Techniques: Multiplanar multisequence imaging of the mild knee was  obtained without administration of intra-articular or intravenous  contrast using routine protocol.     History: Chronic knee pain with activity and at rest when knees are in  flexed position.  Eval for inflammation vs injury.  Extensive  therapy  and home exercises have improved pain but still has daily pain;  Patellofemoral pain syndrome of left knee     Comparison: Radiographs 11/19/2022  Findings:  MENISCI:  Medial meniscus: Horizontal tearing of the posterior root ligament.  Lateral meniscus: Intact.     LIGAMENTS  Cruciate ligaments: Intact.  Medial supporting structures: Intact.  Lateral supporting structures: Intact.     EXTENSOR MECHANISM  Intact. Mild edema in the suprapatellar fat pad.  FLUID  No joint effusion. Small Baker's cyst.     OSSEOUS and ARTICULAR STRUCTURES  Bones: No fracture, contusion, or osseous lesion is seen.  Patellofemoral compartment: No hyaline cartilage disease.  Medial compartment: No hyaline cartilage disease.  Lateral compartment: No hyaline cartilage disease.     ANCILLARY FINDINGS  None.                                                                  Impression:  1. Horizontal tearing of the posterior root ligament of the medial  meniscus.  2. Intact lateral meniscus, cruciate ligaments, and medial and lateral  supporting structures.     LIBERTY ELLIS MD (Joe)     Review of prior external note(s) from - Dr. Yeo  Review of the result(s) of each unique test - L knee MRI      Again, thank you for allowing me to participate in the care of your patient.        Sincerely,        Leah Moon, DO   Path Notes (To The Dermatopathologist): Please check margins.

## 2023-10-03 ENCOUNTER — OFFICE VISIT (OUTPATIENT)
Dept: ORTHOPEDICS | Facility: CLINIC | Age: 33
End: 2023-10-03
Payer: COMMERCIAL

## 2023-10-03 VITALS
SYSTOLIC BLOOD PRESSURE: 110 MMHG | DIASTOLIC BLOOD PRESSURE: 74 MMHG | WEIGHT: 149 LBS | BODY MASS INDEX: 23.39 KG/M2 | HEIGHT: 67 IN

## 2023-10-03 DIAGNOSIS — S83.222D PERIPHERAL TEAR OF MEDIAL MENISCUS OF LEFT KNEE, UNSPECIFIED WHETHER OLD OR CURRENT TEAR, SUBSEQUENT ENCOUNTER: ICD-10-CM

## 2023-10-03 DIAGNOSIS — M22.2X2 PATELLOFEMORAL PAIN SYNDROME OF LEFT KNEE: Primary | ICD-10-CM

## 2023-10-03 PROCEDURE — 20611 DRAIN/INJ JOINT/BURSA W/US: CPT | Mod: LT | Performed by: FAMILY MEDICINE

## 2023-10-03 RX ORDER — ROPIVACAINE HYDROCHLORIDE 5 MG/ML
4 INJECTION, SOLUTION EPIDURAL; INFILTRATION; PERINEURAL
Status: SHIPPED | OUTPATIENT
Start: 2023-10-03

## 2023-10-03 RX ORDER — METHYLPREDNISOLONE ACETATE 40 MG/ML
40 INJECTION, SUSPENSION INTRA-ARTICULAR; INTRALESIONAL; INTRAMUSCULAR; SOFT TISSUE
Status: SHIPPED | OUTPATIENT
Start: 2023-10-03

## 2023-10-03 RX ADMIN — METHYLPREDNISOLONE ACETATE 40 MG: 40 INJECTION, SUSPENSION INTRA-ARTICULAR; INTRALESIONAL; INTRAMUSCULAR; SOFT TISSUE at 16:09

## 2023-10-03 RX ADMIN — ROPIVACAINE HYDROCHLORIDE 4 ML: 5 INJECTION, SOLUTION EPIDURAL; INFILTRATION; PERINEURAL at 16:09

## 2023-10-03 NOTE — PATIENT INSTRUCTIONS
1. Patellofemoral pain syndrome of left knee    2. Peripheral tear of medial meniscus of left knee, unspecified whether old or current tear, subsequent encounter      -Patient is following up for chronic left knee pain due to patellofemoral syndrome and medial meniscus tear.  -Patient went on a trip to [place] last month and was doing a lot of walking up and down concrete steps aggravating her pain  -Patient was seen by Dr. Moon recently, was placed on oral meloxicam and daily without relief and home exercise program.  -Patient was also given a DonJoy knee brace which has not provided any relief either.  -Patient tolerated left knee intra-articular cortisone injection today without complications.  Patient was given postprocedure instructions  -Patient will restart home exercise program in 1 to 2 weeks once pain improves.  Patient will slowly return to activity as her pain allows  -Call direct clinic number [[phone]] at any time with questions or concerns.    Albert Yeo MD [facility] and [facility]  [facility] [place]

## 2023-10-03 NOTE — LETTER
10/3/2023         RE: Melba Galvan  [address] [place] [address]        Dear Colleague,    Thank you for referring your patient, Melba Galvan, to the [facility] SPORTS MEDICINE CLINIC [place]. Please see a copy of my visit note below.    ASSESSMENT & PLAN  Patient Instructions     1. Patellofemoral pain syndrome of left knee    2. Peripheral tear of medial meniscus of left knee, unspecified whether old or current tear, subsequent encounter      -Patient is following up for chronic left knee pain due to patellofemoral syndrome and medial meniscus tear.  -Patient went on a trip to [place] last month and was doing a lot of walking up and down concrete steps aggravating her pain  -Patient was seen by Dr. Moon recently, was placed on oral meloxicam and daily without relief and home exercise program.  -Patient was also given a DonJoy knee brace which has not provided any relief either.  -Patient tolerated left knee intra-articular cortisone injection today without complications.  Patient was given postprocedure instructions  -Patient will restart home exercise program in 1 to 2 weeks once pain improves.  Patient will slowly return to activity as her pain allows  -Call direct clinic number [[phone]] at any time with questions or concerns.    Albert Yeo MD [facility] and Sports Medicine  [facility] [place]        -----    SUBJECTIVE:  Melba Galvan is a 33 year old female who is seen in follow-up for bilateral knee pain.They were last seen .     Since their last visit reports worsening pain in left knee and about the same in right knee. They indicate that their current pain level in right knee is 3/10 and at worst is 5/10. Her current pain level in left knee is 6-7/10. They have tried rest/activity avoidance, ice, other medications: Meloxicam - no relief, home exercises, physical therapy (3 visits), and casting/splinting/bracing.      The patient is seen by  "themselves.    Patient's past medical, surgical, social, and family histories were reviewed today and no changes are noted.    REVIEW OF SYSTEMS:  Constitutional: NEGATIVE for fever, chills, change in weight  Skin: NEGATIVE for worrisome rashes, moles or lesions  GI/: NEGATIVE for bowel or bladder changes  Neuro: NEGATIVE for weakness, dizziness or paresthesias    OBJECTIVE:  /74   Ht 1.702 m (5' 7\")   Wt 67.6 kg (149 lb)   BMI 23.34 kg/m     General: healthy, alert and in no distress  HEENT: no scleral icterus or conjunctival erythema  Skin: no suspicious lesions or rash. No jaundice.  CV: regular rhythm by palpation, no pedal edema  Resp: normal respiratory effort without conversational dyspnea   Psych: normal mood and affect  Gait: normal steady gait with appropriate coordination and balance  Neuro: normal light touch sensory exam of the extremities.    MSK:  LEFT KNEE  Inspection:    normal alignment  Palpation:    Tender about the medial and lateral patellar facet (with knee flexed) and medial joint line. Remainder of bony and ligamentous landmarks are nontender.    No effusion is present    Patellofemoral crepitus is Absent  Range of Motion:     00 extension to 1250 flexion  Strength:    Quadriceps grossly intact    Extensor mechanism intact  Special Tests:    Positive: Patellar grind, Chai's    Negative: MCL/valgus stress (0 & 30 deg), LCL/varus stress (0 & 30 deg), Lachman's, anterior drawer, posterior drawer    Independent visualization of the below image:    Large Joint Injection/Arthocentesis: L knee joint    Date/Time: 10/3/2023 4:09 PM    Performed by: Yeo, Albert, MD  Authorized by: Yeo, Albert, MD    Indications:  Pain and osteoarthritis  Needle Size:  22 G  Guidance: ultrasound    Approach:  Anterolateral  Location:  Knee      Medications:  40 mg methylPREDNISolone 40 MG/ML; 4 mL ROPivacaine 5 MG/ML  Outcome:  Tolerated well, no immediate complications  Procedure discussed: discussed " risks, benefits, and alternatives    Consent Given by:  Patient  Timeout: timeout called immediately prior to procedure    Prep: patient was prepped and draped in usual sterile fashion     Ultrasound was used to ensure safe and accurate needle placement and injection. Ultrasound images of the procedure were permanently stored.          Albert Yeo MD, [facility]        Again, thank you for allowing me to participate in the care of your patient.        Sincerely,        Albert Yeo, MD

## 2023-10-03 NOTE — PROGRESS NOTES
ASSESSMENT & PLAN  Patient Instructions     1. Patellofemoral pain syndrome of left knee    2. Peripheral tear of medial meniscus of left knee, unspecified whether old or current tear, subsequent encounter      -Patient is following up for chronic left knee pain due to patellofemoral syndrome and medial meniscus tear.  -Patient went on a trip to [place] last month and was doing a lot of walking up and down concrete steps aggravating her pain  -Patient was seen by Dr. Moon recently, was placed on oral meloxicam and daily without relief and home exercise program.  -Patient was also given a DonJoy knee brace which has not provided any relief either.  -Patient tolerated left knee intra-articular cortisone injection today without complications.  Patient was given postprocedure instructions  -Patient will restart home exercise program in 1 to 2 weeks once pain improves.  Patient will slowly return to activity as her pain allows  -Call direct clinic number [[phone]] at any time with questions or concerns.    Albert Yeo MD [facility] Orth[facility]ics and Sports Medicine  [facility]        -----    SUBJECTIVE:  Melba Galvan is a 33 year old female who is seen in follow-up for bilateral knee pain.They were last seen .     Since their last visit reports worsening pain in left knee and about the same in right knee. They indicate that their current pain level in right knee is 3/10 and at worst is 5/10. Her current pain level in left knee is 6-7/10. They have tried rest/activity avoidance, ice, other medications: Meloxicam - no relief, home exercises, physical therapy (3 visits), and casting/splinting/bracing.      The patient is seen by themselves.    Patient's past medical, surgical, social, and family histories were reviewed today and no changes are noted.    REVIEW OF SYSTEMS:  Constitutional: NEGATIVE for fever, chills, change in weight  Skin: NEGATIVE for worrisome rashes, moles or  "lesions  GI/: NEGATIVE for bowel or bladder changes  Neuro: NEGATIVE for weakness, dizziness or paresthesias    OBJECTIVE:  /74   Ht 1.702 m (5' 7\")   Wt 67.6 kg (149 lb)   BMI 23.34 kg/m     General: healthy, alert and in no distress  HEENT: no scleral icterus or conjunctival erythema  Skin: no suspicious lesions or rash. No jaundice.  CV: regular rhythm by palpation, no pedal edema  Resp: normal respiratory effort without conversational dyspnea   Psych: normal mood and affect  Gait: normal steady gait with appropriate coordination and balance  Neuro: normal light touch sensory exam of the extremities.    MSK:  LEFT KNEE  Inspection:    normal alignment  Palpation:    Tender about the medial and lateral patellar facet (with knee flexed) and medial joint line. Remainder of bony and ligamentous landmarks are nontender.    No effusion is present    Patellofemoral crepitus is Absent  Range of Motion:     00 extension to 1250 flexion  Strength:    Quadriceps grossly intact    Extensor mechanism intact  Special Tests:    Positive: Patellar grind, Chai's    Negative: MCL/valgus stress (0 & 30 deg), LCL/varus stress (0 & 30 deg), Lachman's, anterior drawer, posterior drawer    Independent visualization of the below image:    Large Joint Injection/Arthocentesis: L knee joint    Date/Time: 10/3/2023 4:09 PM    Performed by: Yeo, Albert, MD  Authorized by: Yeo, Albert, MD    Indications:  Pain and osteoarthritis  Needle Size:  22 G  Guidance: ultrasound    Approach:  Anterolateral  Location:  Knee      Medications:  40 mg methylPREDNISolone 40 MG/ML; 4 mL ROPivacaine 5 MG/ML  Outcome:  Tolerated well, no immediate complications  Procedure discussed: discussed risks, benefits, and alternatives    Consent Given by:  Patient  Timeout: timeout called immediately prior to procedure    Prep: patient was prepped and draped in usual sterile fashion     Ultrasound was used to ensure safe and accurate needle placement " and injection. Ultrasound images of the procedure were permanently stored.          Albert Yeo MD, [facility]

## 2023-12-11 ENCOUNTER — OFFICE VISIT (OUTPATIENT)
Dept: FAMILY MEDICINE | Facility: CLINIC | Age: 33
End: 2023-12-11
Payer: COMMERCIAL

## 2023-12-11 ENCOUNTER — ANCILLARY PROCEDURE (OUTPATIENT)
Dept: GENERAL RADIOLOGY | Facility: CLINIC | Age: 33
End: 2023-12-11
Attending: PHYSICIAN ASSISTANT
Payer: COMMERCIAL

## 2023-12-11 VITALS
BODY MASS INDEX: 24.11 KG/M2 | TEMPERATURE: 97.3 F | DIASTOLIC BLOOD PRESSURE: 68 MMHG | SYSTOLIC BLOOD PRESSURE: 102 MMHG | RESPIRATION RATE: 16 BRPM | HEART RATE: 85 BPM | HEIGHT: 67 IN | WEIGHT: 153.6 LBS | OXYGEN SATURATION: 97 %

## 2023-12-11 DIAGNOSIS — R07.81 PLEURITIC CHEST PAIN: ICD-10-CM

## 2023-12-11 DIAGNOSIS — R07.81 PLEURITIC CHEST PAIN: Primary | ICD-10-CM

## 2023-12-11 DIAGNOSIS — R09.89 RHONCHI: ICD-10-CM

## 2023-12-11 PROCEDURE — 99213 OFFICE O/P EST LOW 20 MIN: CPT | Mod: 25 | Performed by: PHYSICIAN ASSISTANT

## 2023-12-11 PROCEDURE — 71046 X-RAY EXAM CHEST 2 VIEWS: CPT | Mod: TC | Performed by: RADIOLOGY

## 2023-12-11 PROCEDURE — 93000 ELECTROCARDIOGRAM COMPLETE: CPT | Performed by: PHYSICIAN ASSISTANT

## 2023-12-11 PROCEDURE — 87635 SARS-COV-2 COVID-19 AMP PRB: CPT | Performed by: PHYSICIAN ASSISTANT

## 2023-12-11 RX ORDER — ALBUTEROL SULFATE 90 UG/1
2 AEROSOL, METERED RESPIRATORY (INHALATION) EVERY 6 HOURS PRN
Qty: 18 G | Refills: 0 | Status: SHIPPED | OUTPATIENT
Start: 2023-12-11 | End: 2024-04-03

## 2023-12-11 ASSESSMENT — ENCOUNTER SYMPTOMS
SHORTNESS OF BREATH: 1
BACK PAIN: 1

## 2023-12-11 ASSESSMENT — PAIN SCALES - GENERAL: PAINLEVEL: MODERATE PAIN (5)

## 2023-12-11 NOTE — PROGRESS NOTES
"  Assessment & Plan     Pleuritic chest pain  Cxr and EKG reassuring for any indications of pericarditis, acs, CAP, pneumothorax. Considered pulmonary embolism as well, but perc 0 makes this unlikely. Given congestion as well as short course, likely pleurisy. Does have mild rhonchi on exam and history questionable for RAD. Albuterol prn given. Covid and flu testing pending. If no improvement in 1 week or worsening, rtc. Of note, consider atypical gastritis vs gallbladder etiology. Consider this in future if persistent symptoms.   - Symptomatic COVID-19 Virus (Coronavirus) by PCR; Future  - EKG 12-lead complete w/read - Clinics  - Influenza A/B antigen  - XR Chest 2 Views; Future  - Symptomatic COVID-19 Virus (Coronavirus) by PCR Nose    Rhonchi  As above   - albuterol (PROAIR HFA/PROVENTIL HFA/VENTOLIN HFA) 108 (90 Base) MCG/ACT inhaler; Inhale 2 puffs into the lungs every 6 hours as needed for shortness of breath, wheezing or cough  Medication use and side effects discussed with the patient. Patient is in complete understanding and agreement with plan.       Robert Shipley PA-C  [facility]    Stephenie Reilly is a 33 year old, presenting for the following health issues:  Chest Pain (\"Lung pain\")        12/11/2023    10:33 AM   Additional Questions   Roomed by Alina DAN CMA     History of Present Illness       Reason for visit:  Lung pain  Symptom onset:  1-3 days ago    She eats 2-3 servings of fruits and vegetables daily.She consumes 0 sweetened beverage(s) daily.She exercises with enough effort to increase her heart rate 30 to 60 minutes per day.  She exercises with enough effort to increase her heart rate 3 or less days per week.   She is taking medications regularly.  Chest Pain   This is a new problem. The current episode started more than 2 days ago. The problem occurs constantly. The problem has been rapidly worsening. The pain is present in the epigastric region. The " "pain is at a severity of 5/10. The pain is moderate. The quality of the pain is described as pressure-like and heavy. The pain radiates to the mid back. The symptoms are aggravated by certain positions. Associated symptoms include back pain and shortness of breath. She has tried rest for the symptoms. The treatment provided no relief. There are no known risk factors.          No history of similar symptoms recently, however, when living in [place] would get similar symptoms with poor air quality. However, since moving to [place] has not had similar symptoms. No known history of asthma known. Does admit gerd history, however, denies any current symptoms.         Review of Systems   Respiratory:  Positive for shortness of breath.    Cardiovascular:  Positive for chest pain.   Musculoskeletal:  Positive for back pain.      Constitutional, HEENT, cardiovascular, pulmonary, GI, , musculoskeletal, neuro, skin, endocrine and psych systems are negative, except as otherwise noted.      Objective    /68 (BP Location: Right arm, Patient Position: Sitting, Cuff Size: Adult Regular)   Pulse 85   Temp 97.3  F (36.3  C) (Temporal)   Resp 16   Ht 1.702 m (5' 7\")   Wt 69.7 kg (153 lb 9.6 oz)   LMP  (LMP Unknown)   SpO2 97%   BMI 24.06 kg/m    Body mass index is 24.06 kg/m .  Physical Exam   GENERAL: healthy, alert and no distress  HENT: normal cephalic/atraumatic, both ears: sunken TM, nasal mucosa edematous , oropharynx clear, and oral mucous membranes moist  RESP: no rales or wheezing. Left upper posterior with rhonchi. Clearing with deep breaths.   CV: regular rates and rhythm, normal S1 S2, no S3 or S4, and no murmur, click or rub  PSYCH: mentation appears normal, affect normal/bright    CXR - Reviewed and interpreted by me Normal- no infiltrates, effusions, pneumothoraces, cardiomegaly or masses  EKG - Reviewed and interpreted by me appears normal, NSR, normal axis, normal intervals, no acute ST/T changes c/w ischemia, " no LVH by voltage criteria    Perc: 0

## 2023-12-12 LAB — SARS-COV-2 RNA RESP QL NAA+PROBE: NEGATIVE

## 2024-01-02 ENCOUNTER — OFFICE VISIT (OUTPATIENT)
Dept: FAMILY MEDICINE | Facility: CLINIC | Age: 34
End: 2024-01-02
Payer: COMMERCIAL

## 2024-01-02 ENCOUNTER — ANCILLARY PROCEDURE (OUTPATIENT)
Dept: GENERAL RADIOLOGY | Facility: CLINIC | Age: 34
End: 2024-01-02
Attending: PHYSICIAN ASSISTANT
Payer: COMMERCIAL

## 2024-01-02 VITALS
SYSTOLIC BLOOD PRESSURE: 103 MMHG | HEART RATE: 75 BPM | DIASTOLIC BLOOD PRESSURE: 70 MMHG | OXYGEN SATURATION: 100 % | TEMPERATURE: 98 F

## 2024-01-02 DIAGNOSIS — R07.9 CHEST PAIN, UNSPECIFIED TYPE: ICD-10-CM

## 2024-01-02 DIAGNOSIS — M54.6 ACUTE RIGHT-SIDED THORACIC BACK PAIN: Primary | ICD-10-CM

## 2024-01-02 DIAGNOSIS — S29.012A STRAIN OF MUSCLE AND TENDON OF BACK WALL OF THORAX, INITIAL ENCOUNTER: ICD-10-CM

## 2024-01-02 PROCEDURE — 99213 OFFICE O/P EST LOW 20 MIN: CPT

## 2024-01-02 PROCEDURE — 71046 X-RAY EXAM CHEST 2 VIEWS: CPT | Mod: TC | Performed by: STUDENT IN AN ORGANIZED HEALTH CARE EDUCATION/TRAINING PROGRAM

## 2024-01-02 RX ORDER — NAPROXEN SODIUM 550 MG/1
550 TABLET ORAL 2 TIMES DAILY WITH MEALS
Qty: 14 TABLET | Refills: 0 | Status: SHIPPED | OUTPATIENT
Start: 2024-01-02 | End: 2024-01-09

## 2024-01-02 ASSESSMENT — ENCOUNTER SYMPTOMS
WHEEZING: 0
CHEST TIGHTNESS: 0
SHORTNESS OF BREATH: 0
BACK PAIN: 1
COUGH: 0

## 2024-01-02 NOTE — PROGRESS NOTES
Assessment & Plan          1. Acute right-sided thoracic back pain    - naproxen sodium (ANAPROX) 550 MG tablet; Take 1 tablet (550 mg) by mouth 2 times daily (with meals) for 7 days  Dispense: 14 tablet; Refill: 0  - Massage Therapy Referral; Future  - Physical Therapy Referral; Future    2. Strain of muscle and tendon of back wall of thorax, initial encounter    - Massage Therapy Referral; Future  - Physical Therapy Referral; Future    3. Chest pain, unspecified type    - XR Chest 2 Views is negative for pneumonia per my read      Results for orders placed or performed in visit on 01/02/24   XR Chest 2 Views     Status: None    Narrative    EXAM: XR CHEST 2 VIEWS  LOCATION: [facility]  DATE: 1/2/2024    INDICATION:  Chest pain, unspecified type.  COMPARISON: Chest radiograph 12/11/2023.      Impression    IMPRESSION: No focal consolidation, pleural effusion or pneumothorax. Cardiomediastinal silhouette is unremarkable.         Patient Instructions   Try Voltaren gel for back pain  Perform hot epsom salt soaks, light stretching, lidocaine patches as needed.       Return if symptoms worsen or fail to improve, for Follow up.    At the end of the encounter, I discussed results, diagnosis, medications. Discussed red flags for immediate return to clinic/ER, as well as indications for follow up if no improvement. Patient understood and agreed to plan. Patient was stable for discharge.    Stephenie Reilly is a 33 year old female who presents to clinic today for the following health issues:  Chief Complaint   Patient presents with    Back Pain     Mid back pain-stabbing pain- with movement. No trauma.      HPI    Patient reports chest pain, right-sided thoracic back pain which started 3 days ago.  She denies history of trauma or injury.  She has pain with movement.  She has pain when she picks it up her daughter.  She describes the pain as stabbing pain.  She has been taking  acetaminophen and OTC pain medications which have not helped.  She rates pain at 7 out of 10 when worse.  Patient is concerned about pneumonia.  She was seen in the PCP clinic for cough 2 weeks ago. Chest x-ray was normal.  She denies fever or chills.      Review of Systems   Respiratory:  Negative for cough, chest tightness, shortness of breath and wheezing.    Cardiovascular:  Positive for chest pain.   Musculoskeletal:  Positive for back pain.       Problem List:  2022-07: Patellofemoral pain syndrome of both knees  2021-12: Indication for care in labor or delivery  2021-09: Encounter for triage in pregnant patient  2021-05: Supervision of normal IUP (intrauterine pregnancy) in   primigravida  2021-04: Candidiasis of vulva and vagina  2021-04: Screening for cervical cancer      Past Medical History:   Diagnosis Date    IUD (intrauterine device) in place     Mirena inserted 05/19/23       Social History     Tobacco Use    Smoking status: Former     Packs/day: 0.50     Years: 10.00     Additional pack years: 0.00     Total pack years: 5.00     Types: Hookah, Cigarettes     Start date: 9/1/2010     Quit date: 9/1/2020     Years since quitting: 3.3    Smokeless tobacco: Never   Substance Use Topics    Alcohol use: Never           Objective    /70   Pulse 75   Temp 98  F (36.7  C) (Tympanic)   LMP  (LMP Unknown)   SpO2 100%   Physical Exam  Vitals and nursing note reviewed.   Constitutional:       Appearance: Normal appearance.   Cardiovascular:      Rate and Rhythm: Normal rate and regular rhythm.   Pulmonary:      Effort: Pulmonary effort is normal.      Breath sounds: Normal breath sounds.   Musculoskeletal:         General: Normal range of motion.        Arms:       Cervical back: Normal range of motion and neck supple.      Thoracic back: Tenderness present. No swelling.        Back:       Comments: Tenderness with palpation on the right thoracic back    Skin:     General: Skin is warm and dry.       Findings: No rash.   Neurological:      General: No focal deficit present.      Mental Status: She is alert and oriented to person, place, and time.      Cranial Nerves: Cranial nerves 2-12 are intact.      Sensory: Sensation is intact.      Motor: Motor function is intact.      Gait: Gait is intact.   Psychiatric:         Mood and Affect: Mood normal.         Behavior: Behavior normal.              Tomasa Elizalde PA-C

## 2024-01-02 NOTE — PATIENT INSTRUCTIONS
Try Voltaren gel for back pain  Perform hot epsom salt soaks, light stretching, lidocaine patches as needed.

## 2024-02-14 ENCOUNTER — PATIENT OUTREACH (OUTPATIENT)
Dept: CARE COORDINATION | Facility: CLINIC | Age: 34
End: 2024-02-14
Payer: COMMERCIAL

## 2024-04-03 ENCOUNTER — OFFICE VISIT (OUTPATIENT)
Dept: FAMILY MEDICINE | Facility: CLINIC | Age: 34
End: 2024-04-03
Payer: COMMERCIAL

## 2024-04-03 VITALS
HEIGHT: 67 IN | TEMPERATURE: 97.9 F | BODY MASS INDEX: 24.01 KG/M2 | WEIGHT: 153 LBS | SYSTOLIC BLOOD PRESSURE: 101 MMHG | HEART RATE: 92 BPM | RESPIRATION RATE: 11 BRPM | DIASTOLIC BLOOD PRESSURE: 68 MMHG | OXYGEN SATURATION: 100 %

## 2024-04-03 DIAGNOSIS — F43.9 STRESS: ICD-10-CM

## 2024-04-03 DIAGNOSIS — R14.3 FLATULENCE: ICD-10-CM

## 2024-04-03 DIAGNOSIS — R14.0 ABDOMINAL BLOATING: ICD-10-CM

## 2024-04-03 DIAGNOSIS — Z00.00 ROUTINE GENERAL MEDICAL EXAMINATION AT A HEALTH CARE FACILITY: Primary | ICD-10-CM

## 2024-04-03 LAB
ERYTHROCYTE [DISTWIDTH] IN BLOOD BY AUTOMATED COUNT: 13.2 % (ref 10–15)
HCT VFR BLD AUTO: 40.8 % (ref 35–47)
HGB BLD-MCNC: 13.9 G/DL (ref 11.7–15.7)
MCH RBC QN AUTO: 28.5 PG (ref 26.5–33)
MCHC RBC AUTO-ENTMCNC: 34.1 G/DL (ref 31.5–36.5)
MCV RBC AUTO: 84 FL (ref 78–100)
PLATELET # BLD AUTO: 236 10E3/UL (ref 150–450)
RBC # BLD AUTO: 4.87 10E6/UL (ref 3.8–5.2)
WBC # BLD AUTO: 7 10E3/UL (ref 4–11)

## 2024-04-03 PROCEDURE — 36415 COLL VENOUS BLD VENIPUNCTURE: CPT | Performed by: PHYSICIAN ASSISTANT

## 2024-04-03 PROCEDURE — 99214 OFFICE O/P EST MOD 30 MIN: CPT | Mod: 25 | Performed by: PHYSICIAN ASSISTANT

## 2024-04-03 PROCEDURE — 85027 COMPLETE CBC AUTOMATED: CPT | Performed by: PHYSICIAN ASSISTANT

## 2024-04-03 PROCEDURE — 80053 COMPREHEN METABOLIC PANEL: CPT | Performed by: PHYSICIAN ASSISTANT

## 2024-04-03 PROCEDURE — 99395 PREV VISIT EST AGE 18-39: CPT | Performed by: PHYSICIAN ASSISTANT

## 2024-04-03 PROCEDURE — 84443 ASSAY THYROID STIM HORMONE: CPT | Performed by: PHYSICIAN ASSISTANT

## 2024-04-03 SDOH — HEALTH STABILITY: PHYSICAL HEALTH: ON AVERAGE, HOW MANY DAYS PER WEEK DO YOU ENGAGE IN MODERATE TO STRENUOUS EXERCISE (LIKE A BRISK WALK)?: 5 DAYS

## 2024-04-03 ASSESSMENT — SOCIAL DETERMINANTS OF HEALTH (SDOH): HOW OFTEN DO YOU GET TOGETHER WITH FRIENDS OR RELATIVES?: ONCE A WEEK

## 2024-04-03 ASSESSMENT — PAIN SCALES - GENERAL: PAINLEVEL: NO PAIN (0)

## 2024-04-03 NOTE — PATIENT INSTRUCTIONS
Preventive Care Advice   This is general advice given by our system to help you stay healthy. However, your care team may have specific advice just for you. Please talk to your care team about your preventive care needs.  Nutrition  Eat 5 or more servings of fruits and vegetables each day.  Try wheat bread, brown rice and whole grain pasta (instead of white bread, rice, and pasta).  Get enough calcium and vitamin D. Check the label on foods and aim for 100% of the RDA (recommended daily allowance).  Lifestyle  Exercise at least 150 minutes each week   (30 minutes a day, 5 days a week).  Do muscle strengthening activities 2 days a week. These help control your weight and prevent disease.  No smoking.  Wear sunscreen to prevent skin cancer.  Have a dental exam and cleaning every 6 months.  Yearly exams  See your health care team every year to talk about:  Any changes in your health.  Any medicines your care team has prescribed.  Preventive care, family planning, and ways to prevent chronic diseases.  Shots (vaccines)   HPV shots (up to age 26), if you've never had them before.  Hepatitis B shots (up to age 59), if you've never had them before.  COVID-19 shot: Get this shot when it's due.  Flu shot: Get a flu shot every year.  Tetanus shot: Get a tetanus shot every 10 years.  Pneumococcal, hepatitis A, and RSV shots: Ask your care team if you need these based on your risk.  Shingles shot (for age 50 and up).  General health tests  Diabetes screening:  Starting at age 35, Get screened for diabetes at least every 3 years.  If you are younger than age 35, ask your care team if you should be screened for diabetes.  Cholesterol test: At age 39, start having a cholesterol test every 5 years, or more often if advised.  Bone density scan (DEXA): At age 50, ask your care team if you should have this scan for osteoporosis (brittle bones).  Hepatitis C: Get tested at least once in your life.  STIs (sexually transmitted  infections)  Before age 24: Ask your care team if you should be screened for STIs.  After age 24: Get screened for STIs if you're at risk. You are at risk for STIs (including HIV) if:  You are sexually active with more than one person.  You don't use condoms every time.  You or a partner was diagnosed with a sexually transmitted infection.  If you are at risk for HIV, ask about PrEP medicine to prevent HIV.  Get tested for HIV at least once in your life, whether you are at risk for HIV or not.  Cancer screening tests  Cervical cancer screening: If you have a cervix, begin getting regular cervical cancer screening tests at age 21. Most people who have regular screenings with normal results can stop after age 65. Talk about this with your provider.  Breast cancer scan (mammogram): If you've ever had breasts, begin having regular mammograms starting at age 40. This is a scan to check for breast cancer.  Colon cancer screening: It is important to start screening for colon cancer at age 45.  Have a colonoscopy test every 10 years (or more often if you're at risk) Or, ask your provider about stool tests like a FIT test every year or Cologuard test every 3 years.  To learn more about your testing options, visit: https://www.[employer]/849043.pdf.  For help making a decision, visit: https://bit.ly/np15981.  Prostate cancer screening test: If you have a prostate and are age 55 to 69, ask your provider if you would benefit from a yearly prostate cancer screening test.  Lung cancer screening: If you are a current or former smoker age 50 to 80, ask your care team if ongoing lung cancer screenings are right for you.  For informational purposes only. Not to replace the advice of your health care provider. Copyright   2023 [employer]. All rights reserved. Clinically reviewed by the [facility] Transitions Program. "[employer] 926317 - REV 01/24.    Learning About Stress  What is stress?     Stress is your  body's response to a hard situation. Your body can have a physical, emotional, or mental response. Stress is a fact of life for most people, and it affects everyone differently. What causes stress for you may not be stressful for someone else.  A lot of things can cause stress. You may feel stress when you go on a job interview, take a test, or run a race. This kind of short-term stress is normal and even useful. It can help you if you need to work hard or react quickly. For example, stress can help you finish an important job on time.  Long-term stress is caused by ongoing stressful situations or events. Examples of long-term stress include long-term health problems, ongoing problems at work, or conflicts in your family. Long-term stress can harm your health.  How does stress affect your health?  When you are stressed, your body responds as though you are in danger. It makes hormones that speed up your heart, make you breathe faster, and give you a burst of energy. This is called the fight-or-flight stress response. If the stress is over quickly, your body goes back to normal and no harm is done.  But if stress happens too often or lasts too long, it can have bad effects. Long-term stress can make you more likely to get sick, and it can make symptoms of some diseases worse. If you tense up when you are stressed, you may develop neck, shoulder, or low back pain. Stress is linked to high blood pressure and heart disease.  Stress also harms your emotional health. It can make you grimes, tense, or depressed. Your relationships may suffer, and you may not do well at work or school.  What can you do to manage stress?  You can try these things to help manage stress:   Do something active. Exercise or activity can help reduce stress. Walking is a great way to get started. Even everyday activities such as housecleaning or yard work can help.  Try yoga or marlys chi. These techniques combine exercise and meditation. You may need  some training at first to learn them.  Do something you enjoy. For example, listen to music or go to a movie. Practice your hobby or do volunteer work.  Meditate. This can help you relax, because you are not worrying about what happened before or what may happen in the future.  Do guided imagery. Imagine yourself in any setting that helps you feel calm. You can use online videos, books, or a teacher to guide you.  Do breathing exercises. For example:  From a standing position, bend forward from the waist with your knees slightly bent. Let your arms dangle close to the floor.  Breathe in slowly and deeply as you return to a standing position. Roll up slowly and lift your head last.  Hold your breath for just a few seconds in the standing position.  Breathe out slowly and bend forward from the waist.  Let your feelings out. Talk, laugh, cry, and express anger when you need to. Talking with supportive friends or family, a counselor, or a faby leader about your feelings is a healthy way to relieve stress. Avoid discussing your feelings with people who make you feel worse.  Write. It may help to write about things that are bothering you. This helps you find out how much stress you feel and what is causing it. When you know this, you can find better ways to cope.  What can you do to prevent stress?  You might try some of these things to help prevent stress:  Manage your time. This helps you find time to do the things you want and need to do.  Get enough sleep. Your body recovers from the stresses of the day while you are sleeping.  Get support. Your family, friends, and community can make a difference in how you experience stress.  Limit your news feed. Avoid or limit time on social media or news that may make you feel stressed.  Do something active. Exercise or activity can help reduce stress. Walking is a great way to get started.  Where can you learn more?  Go to https://www.healthwise.net/patiented  Enter N032 in the  "search box to learn more about \"Learning About Stress.\"  Current as of: October 24, 2023               Content Version: 14.0    7272-5374 [employer].   Care instructions adapted under license by your healthcare professional. If you have questions about a medical condition or this instruction, always ask your healthcare professional. [employer] disclaims any warranty or liability for your use of this information.      "

## 2024-04-03 NOTE — PROGRESS NOTES
Preventive Care Visit  [facility] TRACIEMOMARKUS Gandhi PA-C, Family Medicine  Apr 3, 2024      Assessment & Plan     Routine general medical examination at a health care facility    Abdominal bloating  Flatulence  Suspicious for IBS. Plan to start with labs below. Not obtaining celiac labs today as she has not been consuming gluten. Given persistence of symptoms despite making dietary changes, recommend follow-up with GI for further evaluation.  - TSH with free T4 reflex; Future  - Comprehensive metabolic panel (BMP + Alb, Alk Phos, ALT, AST, Total. Bili, TP); Future  - CBC with platelets; Future  - Adult GI  Referral - Consult Only; Future  - TSH with free T4 reflex  - Comprehensive metabolic panel (BMP + Alb, Alk Phos, ALT, AST, Total. Bili, TP)  - CBC with platelets    Stress  Has had a lot of stress over last 3 months  [place] - [place] due to current war/fighting  Has talked to her therapist who is working with her to process; even though she is not physically there, she has experienced trauma related to the events.  Stress could be contributing to GI symptoms  Continue with therapist  Follow-up if would like to consider medication      Counseling  Appropriate preventive services were discussed with this patient.  Checklist reviewing preventive services available has been given to the patient.         Stephenie Reilly is a 33 year old, presenting for the following:  Physical        4/3/2024     1:10 PM   Additional Questions   Roomed by Laquita ARMSTRONG        Health Care Directive  Patient does not have a Health Care Directive or Living Will: Discussed advance care planning with patient; however, patient declined at this time.      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 No abdominal pain other than with gas. Normal  "appetite. No weight loss. No heartburn, nausea, vomiting. Normal bowel movements - having regular, daily, soft BM; no diarrhea or constipation; no blood in stool, melena. Normal urination. Has always eaten a \"clean\" diet but has tried to eliminate other potential food triggers lately without change in symptoms. Has tried cutting down on dairy, gluten, raw veggies, but nothing has changed. Exercises regularly.     Pt states that this may be stress induced because the only thing that has changed in her life in these past 3 months is having a higher level of stress.     Has had a lot of stress over last 3 months  [place] - worries due to current war/fighting  Less motivation to work  Fatigue  Doesn't really feel depressed, but says she more feels \"numb\"  Doesn't want medication for mood  She is working with therapist    Has IUD in place  Not having regular periods  No concern for pregnancy      4/3/2024   General Health   How would you rate your overall physical health? Good   Feel stress (tense, anxious, or unable to sleep) Rather much   (!) STRESS CONCERN      4/3/2024   Nutrition   Three or more servings of calcium each day? Yes   Diet: Regular (no restrictions)   How many servings of fruit and vegetables per day? (!) 2-3   How many sweetened beverages each day? 0-1         4/3/2024   Exercise   Days per week of moderate/strenous exercise 5 days         4/3/2024   Social Factors   Frequency of gathering with friends or relatives Once a week   Worry food won't last until get money to buy more No   Food not last or not have enough money for food? No   Do you have housing?  Yes   Are you worried about losing your housing? No   Lack of transportation? No   Unable to get utilities (heat,electricity)? No         4/3/2024   Dental   Dentist two times every year? Yes         4/3/2024   TB Screening   Were you born outside of the [place]? No         Today's PHQ-2 Score:       4/3/2024     1:09 PM   PHQ-2 ( 1999 " [employer])   Q1: Little interest or pleasure in doing things 0   Q2: Feeling down, depressed or hopeless 0   PHQ-2 Score 0   Q1: Little interest or pleasure in doing things Not at all   Q2: Feeling down, depressed or hopeless Not at all   PHQ-2 Score 0           4/3/2024   Substance Use   Alcohol more than 3/day or more than 7/wk No   Do you use any other substances recreationally? No     Social History     Tobacco Use    Smoking status: Former     Packs/day: 0.50     Years: 10.00     Additional pack years: 0.00     Total pack years: 5.00     Types: Hookah, Cigarettes     Start date: 2010     Quit date: 2020     Years since quitting: 3.5    Smokeless tobacco: Never   Vaping Use    Vaping Use: Never used   Substance Use Topics    Alcohol use: Never    Drug use: Never                  4/3/2024   STI Screening   New sexual partner(s) since last STI/HIV test? No     History of abnormal Pap smear:         Latest Ref Rng & Units 2021     5:37 PM 2021     4:23 PM   PAP / HPV   PAP (Historical)   NIL    HPV 16 DNA NEG^Negative Negative     HPV 18 DNA NEG^Negative Negative     Other HR HPV NEG^Negative Negative             4/3/2024   Contraception/Family Planning   Questions about contraception or family planning No        Reviewed and updated as needed this visit by Provider   Tobacco  Allergies  Meds  Problems  Med Hx  Surg Hx  Fam Hx            Past Medical History:   Diagnosis Date    IUD (intrauterine device) in place     Mirena inserted 23     Past Surgical History:   Procedure Laterality Date    NO HISTORY OF SURGERY       OB History    Para Term  AB Living   1 1 1 0 0 1   SAB IAB Ectopic Multiple Live Births   0 0 0 0 1      # Outcome Date GA Lbr Jovi/2nd Weight Sex Delivery Anes PTL Lv   1 Term 21 37w6d 05:13 / 01:31 3.289 kg (7 lb 4 oz) F Vag-Spont EPI N VERONICA      Name: Stephie      Apgar1: 8  Apgar5: 9     Lab work is in process  Labs reviewed in EPIC  BP Readings from  "Last 3 Encounters:   04/03/24 101/68   01/02/24 103/70   12/11/23 102/68    Wt Readings from Last 3 Encounters:   04/03/24 69.4 kg (153 lb)   12/11/23 69.7 kg (153 lb 9.6 oz)   10/03/23 67.6 kg (149 lb)                  Patient Active Problem List   Diagnosis   (none) - all problems resolved or deleted     Past Surgical History:   Procedure Laterality Date    NO HISTORY OF SURGERY         Social History     Tobacco Use    Smoking status: Former     Packs/day: 0.50     Years: 10.00     Additional pack years: 0.00     Total pack years: 5.00     Types: Hookah, Cigarettes     Start date: 9/1/2010     Quit date: 9/1/2020     Years since quitting: 3.5    Smokeless tobacco: Never   Substance Use Topics    Alcohol use: Never     Family History   Problem Relation Age of Onset    No Known Problems Mother     No Known Problems Father          Current Outpatient Medications   Medication Sig Dispense Refill    levonorgestrel (MIRENA) 20 MCG/DAY IUD 1 each (20 mcg) by Intrauterine route once      multivitamin w/minerals (MULTI-VITAMIN) tablet Take 1 tablet by mouth daily      Omega 3-6-9 Fatty Acids (OMEGA 3-6-9 PO)        No Known Allergies  Recent Labs   Lab Test 04/03/24  1415 03/01/23  1029 09/13/21  1555   LDL  --  130*  --    HDL  --  63  --    TRIG  --  71  --    ALT 19 8* 24   CR 0.81 0.69 0.57   GFRESTIMATED >90 >90 >90   POTASSIUM 4.2 4.1 3.5   TSH 0.99 0.76  --           Review of Systems  Constitutional, HEENT, cardiovascular, pulmonary, gi and gu systems are negative, except as otherwise noted.     Objective    Exam  /68 (BP Location: Right arm, Patient Position: Sitting, Cuff Size: Adult Regular)   Pulse 92   Temp 97.9  F (36.6  C) (Oral)   Resp 11   Ht 1.702 m (5' 7\")   Wt 69.4 kg (153 lb)   LMP 04/02/2024 (Exact Date)   SpO2 100%   BMI 23.96 kg/m     Estimated body mass index is 23.96 kg/m  as calculated from the following:    Height as of this encounter: 1.702 m (5' 7\").    Weight as of this " encounter: 69.4 kg (153 lb).    Physical Exam  GENERAL: alert and no distress  EYES: Eyes grossly normal to inspection, PERRL and conjunctivae and sclerae normal  HENT: ear canals and TM's normal, nose and mouth without ulcers or lesions  NECK: no adenopathy, no asymmetry, masses, or scars  RESP: lungs clear to auscultation - no rales, rhonchi or wheezes  CV: regular rate and rhythm, normal S1 S2, no S3 or S4, no murmur, click or rub, no peripheral edema  ABDOMEN: soft, nontender, no hepatosplenomegaly, no masses and bowel sounds normal  MS: no gross musculoskeletal defects noted, no edema  NEURO: Normal strength and tone, mentation intact and speech normal  PSYCH: mentation appears normal, affect normal/bright        Signed Electronically by: Gloria Gandhi PA-C

## 2024-04-04 ENCOUNTER — TELEPHONE (OUTPATIENT)
Dept: GASTROENTEROLOGY | Facility: CLINIC | Age: 34
End: 2024-04-04
Payer: COMMERCIAL

## 2024-04-04 LAB
ALBUMIN SERPL BCG-MCNC: 4.4 G/DL (ref 3.5–5.2)
ALP SERPL-CCNC: 57 U/L (ref 40–150)
ALT SERPL W P-5'-P-CCNC: 19 U/L (ref 0–50)
ANION GAP SERPL CALCULATED.3IONS-SCNC: 10 MMOL/L (ref 7–15)
AST SERPL W P-5'-P-CCNC: 19 U/L (ref 0–45)
BILIRUB SERPL-MCNC: 0.3 MG/DL
BUN SERPL-MCNC: 16.2 MG/DL (ref 6–20)
CALCIUM SERPL-MCNC: 9.4 MG/DL (ref 8.6–10)
CHLORIDE SERPL-SCNC: 103 MMOL/L (ref 98–107)
CREAT SERPL-MCNC: 0.81 MG/DL (ref 0.51–0.95)
DEPRECATED HCO3 PLAS-SCNC: 26 MMOL/L (ref 22–29)
EGFRCR SERPLBLD CKD-EPI 2021: >90 ML/MIN/1.73M2
GLUCOSE SERPL-MCNC: 89 MG/DL (ref 70–99)
POTASSIUM SERPL-SCNC: 4.2 MMOL/L (ref 3.4–5.3)
PROT SERPL-MCNC: 6.7 G/DL (ref 6.4–8.3)
SODIUM SERPL-SCNC: 139 MMOL/L (ref 135–145)
TSH SERPL DL<=0.005 MIU/L-ACNC: 0.99 UIU/ML (ref 0.3–4.2)

## 2024-04-04 NOTE — TELEPHONE ENCOUNTER
[facility] Call Center    Phone Message    May a detailed message be left on voicemail: Yes    Reason for Call: Other: Patient is currently scheduled on 5/14/24, as visit type New GI Urgent. This is outside the expected timeline for this referral. Patient has been added to the waitlist.      Pt declined next available (5/13 [place]) due to location.     Action Taken: Message routed to:  Other: GI REFERRAL TRIAGE POOL     Travel Screening: Not Applicable

## 2024-04-08 NOTE — TELEPHONE ENCOUNTER
REFERRAL INFORMATION:  Referring Provider:  Gloria Gandhi PA-C   Referring Clinic:  [facility]   Reason for Visit/Diagnosis: R14.0 (ICD-10-CM) - Abdominal bloating R14.3 (ICD-10-CM) - Flatulence     FUTURE VISIT INFORMATION:  Appointment Date: 4/9/24  Appointment Time: 11:00 AM      NOTES STATUS DETAILS   OFFICE NOTE from Referring Provider Internal 4/3/24 - [facility] OV with Gloria Gandhi PA-C    OFFICE NOTE from Other Specialist Internal 9/20/21 - OBGYN OV with Masters, Erinn Wilcox DO at [facility] [place]     8/16/21 - [facility] OV with Nigel Mitchell DO at [facility]    HOSPITAL DISCHARGE SUMMARY/  ED VISITS Internal 9/13/21 - [facility] ED visit with Kell Rogers MD    MEDICATION LIST Internal         PERTINENT LABS Internal 4/3/24 - CBCPD; CMP: TSH   IMAGING (CT, MRI, EGD, MRCP, Small Bowel Follow Through/SBT, MR/CT Enterography) Internal XR Chest - 1/2/4, 12/11/23    MR Abdomen - 9/13/21

## 2024-04-08 NOTE — TELEPHONE ENCOUNTER
Pt accepted an appointment with Marylou Ernandez on 4/9/2024 at 11 AM for an in-person clinic visit.

## 2024-04-09 ENCOUNTER — TELEPHONE (OUTPATIENT)
Dept: GASTROENTEROLOGY | Facility: CLINIC | Age: 34
End: 2024-04-09

## 2024-04-09 ENCOUNTER — MYC MEDICAL ADVICE (OUTPATIENT)
Dept: GASTROENTEROLOGY | Facility: CLINIC | Age: 34
End: 2024-04-09

## 2024-04-09 ENCOUNTER — TELEPHONE (OUTPATIENT)
Dept: PLASTIC SURGERY | Facility: CLINIC | Age: 34
End: 2024-04-09

## 2024-04-09 ENCOUNTER — PRE VISIT (OUTPATIENT)
Dept: GASTROENTEROLOGY | Facility: CLINIC | Age: 34
End: 2024-04-09

## 2024-04-09 ENCOUNTER — OFFICE VISIT (OUTPATIENT)
Dept: GASTROENTEROLOGY | Facility: CLINIC | Age: 34
End: 2024-04-09
Attending: PHYSICIAN ASSISTANT
Payer: COMMERCIAL

## 2024-04-09 VITALS
HEART RATE: 80 BPM | WEIGHT: 156 LBS | SYSTOLIC BLOOD PRESSURE: 94 MMHG | DIASTOLIC BLOOD PRESSURE: 70 MMHG | HEIGHT: 67 IN | OXYGEN SATURATION: 97 % | BODY MASS INDEX: 24.48 KG/M2

## 2024-04-09 DIAGNOSIS — R10.32 ABDOMINAL PAIN, LEFT LOWER QUADRANT: ICD-10-CM

## 2024-04-09 DIAGNOSIS — R10.84 ABDOMINAL PAIN, GENERALIZED: ICD-10-CM

## 2024-04-09 DIAGNOSIS — R14.3 FLATULENCE: ICD-10-CM

## 2024-04-09 DIAGNOSIS — R14.0 ABDOMINAL BLOATING: Primary | ICD-10-CM

## 2024-04-09 PROCEDURE — 99204 OFFICE O/P NEW MOD 45 MIN: CPT | Performed by: DIETITIAN, REGISTERED

## 2024-04-09 ASSESSMENT — PAIN SCALES - GENERAL: PAINLEVEL: MODERATE PAIN (5)

## 2024-04-09 NOTE — PROGRESS NOTES
GI CLINIC VISIT    CC/REFERRING MD:  Gloria Gandhi  REASON FOR CONSULTATION: bloating, flatulence,    ASSESSMENT/PLAN:  #Bloating  #Flatulence  #Abdominal Pain, lower abdomen  Patient with 3 months of progressively worsening constant abdominal bloating and foul-smelling flatulence with associated lower abdominal/pelvic pain/cramping and new onset left lower sided more sharp pain.  No recent changes/factors to account for this aside from maybe changes in stress level.  No alarm features.  Differential remains broad and includes celiac, DGBI/functional bloating with recent increases in stress, lactose intolerance, fructose malabsorption, other carbohydrate malabsorption, SIBO, biliary/pancreatic etiology, GYN pathology though this would not account for foul gas,possible stool burden though no recent changes and per discussion no signs of constipation.  We will start with getting labs for celiac, reports that she is been eating pasta daily for past week and had only cut out for short time prior to that.  Will plan to get labs in the next week.  Will also pursue abdominal ultrasound to better assess gallbladder and other intra-abdominal organs.  If pain becomes severe would recommend CT in light of new or left-sided pain.  Would also recommend she be evaluated by GYN given lower pelvic discomfort and bloating, she is agreeable to this and will contact provider.  To help control symptoms discussed that she can try Beano.  For abdominal cramping can try Bentyl.  Future consideration include upper endoscopy, hydrogen breath testing, fructose malabsorption testing, abdominal x-ray for stool burden, fecal calprotectin, fecal elastase, retest of H. pylori, trial of low FODMAP diet, enteric-coated peppermint, GI health psychology/hypnotherapy.    -- Get celiac serologies  -- Get abdominal ultrasound  -- Try Bean-o for gas  -- Try dicyclomine for abdominal cramping  -- Check in with OBGYN     Colorectal cancer screening: No  personal or family history of colon cancer or advanced colon polyps, current guidelines recommend starting screening at age 45. Pursue sooner if symptoms change.      RTC 3 months    Thank you for this consultation.  It was a pleasure to participate in the care of this patient; please contact us with any further questions.     58 Minutes was spent on the date of the encounter during chart review, history and exam, documentation, and further activities as noted       Marylou Ernandez PA-C, RD  Division of Gastroenterology, Hepatology & Nutrition  [facility]        CANDELARIO Galvan is a 33 year old female with who presents for evaluation of bloating and flatulence. They are new to the [facility] GI clinic and this is my first encounter with the patient.  She is accompanied by her .    Saw PCP last week regarding bloating and flatulence. TSH, LFTs, BMP, CBC which was normal. Referred to GI for which she presents today.    Today Melba reports progressive worsening in abdominal bloating and foul-smelling flatus over the last 3 months.  This is associated with lower abdomen/pelvic discomfort that she describes as similar to menstrual cramps.  Today she also reports new lower left-sided pain.  Reports she wakes up with symptoms and they continue throughout the day, not necessarily changed by eating or bowel movements.  Feels she is distended, mostly lower abdomen.  Reports gas is frequent and foul, change in smell from previous.  She has tried to change her diet including cutting out dairy which she initially thought was helpful for about a week but symptoms persisted and she has reintroduced without change.  She also tried cutting out gluten, however no change.  Again having gluten in soup daily.  She tried Gas-X but this was not helpful.    No changes with her menstrual cycle.     No medication or supplement changes in the last 3 months.  No travel.  No dietary changes.    She does note increased stress over  the last 6 months, possibly more notable in the last 3 between current war, school, caring for her 2-year-old.  She does have a therapist that she sees regularly.    Bowel pattern has remained stable.  No constipation or diarrhea.  Typically has 2 soft formed stools per day (Mitchell 4) without straining, feels fully empty.  No urgency.  No blood in stool.    No nausea or vomiting.  No epigastric pain.  No reflux or heartburn.    Recently overall appetite is down.  No weight changes.  Eats whole food based diet, minimal eating out.    She does have history of H. pylori which she reports was treated, I do not have confirmation of eradication testing.  She reports that this feels much different than when she was experiencing H. pylori.    DIET RECALL:  Lunch: salad, chicken grilled, rice,   Dinner: soup of potatoes, onions, garlic, peas, noodles  Snacks: nuts and dark chcolate  Drinks: lactose free milk, water      ROS:    No fevers or chills  No weight loss  No blurry vision, double vision or change in vision  No sore throat  No lymphadenopathy  No headache, paraesthesias, or weakness in a limb  No shortness of breath or wheezing  No chest pain or pressure  No arthralgias or myalgias  No rashes or skin changes  No odynophagia or dysphagia  No BRBPR, hematochezia, melena  No dysuria, frequency or urgency  No hot/cold intolerance or polyria  No anxiety or depression      PROBLEM LIST  There are no problems to display for this patient.      PERTINENT PAST MEDICAL HISTORY:  Past Medical History:   Diagnosis Date    IUD (intrauterine device) in place     Mirena inserted 05/19/23       PREVIOUS SURGERIES:  Past Surgical History:   Procedure Laterality Date    NO HISTORY OF SURGERY         PREVIOUS ENDOSCOPY:  EGD at - for GERD, avoid oily food, TUMS    ALLERGIES:   No Known Allergies    PERTINENT MEDICATIONS:    Current Outpatient Medications:     levonorgestrel (MIRENA) 20 MCG/DAY IUD, 1 each (20 mcg) by Intrauterine  route once, Disp: , Rfl:     multivitamin w/minerals (MULTI-VITAMIN) tablet, Take 1 tablet by mouth daily, Disp: , Rfl:     Omega 3-6-9 Fatty Acids (OMEGA 3-6-9 PO), , Disp: , Rfl:     Current Facility-Administered Medications:     4 mL ropivacaine (NAROPIN) injection 5 mg/mL, 4 mL, , , Yeo, Albert, MD, 4 mL at 10/03/23 1609    methylPREDNISolone (DEPO-Medrol) injection 40 mg, 40 mg, , , Yeo, Albert, MD, 40 mg at 10/03/23 1609   No other NSAID/anticoagulation reported by patient.   No other OTC/herbal/supplements reported by patient.        SOCIAL HISTORY:    Tobacco: no  Alcohol: no  Drugs Use: no      Social History     Socioeconomic History    Marital status:      Spouse name: Not on file    Number of children: Not on file    Years of education: Not on file    Highest education level: Not on file   Occupational History    Not on file   Tobacco Use    Smoking status: Former     Packs/day: 0.50     Years: 10.00     Additional pack years: 0.00     Total pack years: 5.00     Types: Hookah, Cigarettes     Start date: 9/1/2010     Quit date: 9/1/2020     Years since quitting: 3.6    Smokeless tobacco: Never   Vaping Use    Vaping Use: Never used   Substance and Sexual Activity    Alcohol use: Never    Drug use: Never    Sexual activity: Yes     Partners: Male     Birth control/protection: I.U.D.     Comment: Mirena inserted 05/19/23   Other Topics Concern    Parent/sibling w/ CABG, MI or angioplasty before 65F 55M? 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 Came here for pharmacist exam- fell in love with nature and staying     Social Determinants of Health     Financial Resource Strain: Low Risk  (4/3/2024)    Financial Resource Strain     Within the past 12 months, have you or your family members you live with been unable to get utilities (heat, electricity) when it was really needed?: No   Food Insecurity: Low Risk  (4/3/2024)    Food Insecurity      Within the past 12 months, did you worry that your food would run out before you got money to buy more?: No     Within the past 12 months, did the food you bought just not last and you didn t have money to get more?: No   Transportation Needs: Low Risk  (4/3/2024)    Transportation Needs     Within the past 12 months, has lack of transportation kept you from medical appointments, getting your medicines, non-medical meetings or appointments, work, or from getting things that you need?: No   Physical Activity: Unknown (4/3/2024)    Exercise Vital Sign     Days of Exercise per Week: 5 days     Minutes of Exercise per Session: Not on file   Stress: Stress Concern Present (4/3/2024)    [employer] [place] [employer] - Occupational Stress Questionnaire     Feeling of Stress : Rather much   Social Connections: Unknown (4/3/2024)    Social Connection and Isolation Panel [NHANES]     Frequency of Communication with Friends and Family: Not on file     Frequency of Social Gatherings with Friends and Family: Once a week     Attends Congregation Services: Not on file     Active Member of Clubs or Organizations: Not on file     Attends Club or Organization Meetings: Not on file     Marital Status: Not on file   Interpersonal Safety: Low Risk  (12/11/2023)    Interpersonal Safety     Do you feel physically and emotionally safe where you currently live?: Yes     Within the past 12 months, have you been hit, slapped, kicked or otherwise physically hurt by someone?: No     Within the past 12 months, have you been humiliated or emotionally abused in other ways by your partner or ex-partner?: No   Housing Stability: Low Risk  (4/3/2024)    Housing Stability     Do you have housing? : Yes     Are you worried about losing your housing?: No       FAMILY HISTORY:  FH of CRC: no  FH of IBD: no  Aunt with IBS  FH of celiac: no  No Colon/Panc/Esophageal/other GI CA. No IBD or Celiac Disease. No other Autoimmune, Liver, or Thyroid  disease.    Family History   Problem Relation Age of Onset    No Known Problems Mother     No Known Problems Father        Past/family/social history reviewed and no changes    PHYSICAL EXAMINATION:  Constitutional: AAOx3, cooperative, pleasant, not dyspneic/diaphoretic, no acute distress  Vitals reviewed: LMP 04/02/2024 (Exact Date)   Wt:   Wt Readings from Last 2 Encounters:   04/03/24 69.4 kg (153 lb)   12/11/23 69.7 kg (153 lb 9.6 oz)      Eyes: Sclera anicteric/injected  Ears/nose/mouth/throat: Normal oropharynx without ulcers or exudate, mucus membranes moist, hearing intact  Neck: supple, thyroid normal size  CV: No edema  Respiratory: Unlabored breathing  Lymph: No axillary, submandibular, supraclavicular or inguinal lymphadenopathy  Abd:  Nondistended, +bs, no hepatosplenomegaly, slightly tender to palpation left lower quadrant, no peritoneal signs  Skin: warm, perfused, no jaundice  Psych: Normal affect  MSK: Normal gait      PERTINENT STUDIES:    Office Visit on 04/03/2024   Component Date Value Ref Range Status    TSH 04/03/2024 0.99  0.30 - 4.20 uIU/mL Final    Sodium 04/03/2024 139  135 - 145 mmol/L Final    Potassium 04/03/2024 4.2  3.4 - 5.3 mmol/L Final    Carbon Dioxide (CO2) 04/03/2024 26  22 - 29 mmol/L Final    Anion Gap 04/03/2024 10  7 - 15 mmol/L Final    Urea Nitrogen 04/03/2024 16.2  6.0 - 20.0 mg/dL Final    Creatinine 04/03/2024 0.81  0.51 - 0.95 mg/dL Final    GFR Estimate 04/03/2024 >90  >60 mL/min/1.73m2 Final    Calcium 04/03/2024 9.4  8.6 - 10.0 mg/dL Final    Chloride 04/03/2024 103  98 - 107 mmol/L Final    Glucose 04/03/2024 89  70 - 99 mg/dL Final    Alkaline Phosphatase 04/03/2024 57  40 - 150 U/L Final    AST 04/03/2024 19  0 - 45 U/L Final    ALT 04/03/2024 19  0 - 50 U/L Final    Protein Total 04/03/2024 6.7  6.4 - 8.3 g/dL Final    Albumin 04/03/2024 4.4  3.5 - 5.2 g/dL Final    Bilirubin Total 04/03/2024 0.3  <=1.2 mg/dL Final    WBC Count 04/03/2024 7.0  4.0 - 11.0 10e3/uL  Final    RBC Count 04/03/2024 4.87  3.80 - 5.20 10e6/uL Final    Hemoglobin 04/03/2024 13.9  11.7 - 15.7 g/dL Final    Hematocrit 04/03/2024 40.8  35.0 - 47.0 % Final    MCV 04/03/2024 84  78 - 100 fL Final    MCH 04/03/2024 28.5  26.5 - 33.0 pg Final    MCHC 04/03/2024 34.1  31.5 - 36.5 g/dL Final    RDW 04/03/2024 13.2  10.0 - 15.0 % Final    Platelet Count 04/03/2024 236  150 - 450 10e3/uL Final

## 2024-04-09 NOTE — PATIENT INSTRUCTIONS
"It was a pleasure taking care of you today.  I've included a brief summary of our discussion and care plan from today's visit below.  Please review this information with your primary care provider.  ______________________________________________________________________    My recommendations are summarized as follows:  -- Get celiac serologies  -- Get abdominal ultrasound  -- Try Bean-o for gas  -- Try dicyclomine for abdominal cramping, this can be taken up to 4 times per day  -- Check in with OBGYN  -- We can consider low FODMAP diet if your symptoms do not improve     FODMAP stands for fermentable oligosaccharides, disaccharides, monosaccharides and polyols, which are short-chain carbohydrates (sugars) that the small intestine absorbs poorly. Some people experience digestive distress after eating them. If you are sensitive to these carbohydrates they can cause symptoms such as diarrhea, abdominal pain, and gas/bloating, constipation. The low FODMAP Diet is a well studied elimination diet where you take away these specific groups of fermentable carbohydrates. After taking out these specific carbohydrates you can slowly add them back in to determine which ones you are most sensitive to. You could also do a \"modified\" approach where you just try taking out one specific type and see if you feel better. Because the diet is so specific I do recommend working with a registered dietitian to help you navigate the foods and elimination/reintroduction.    Low FODMAP websites:    Overview: [email or web address]-diet    Diet Resources:   [facility] Low FODMAP Diet Phone Viral  [email or web address]  [email or web address].[email or web address]    -- please see scheduling information provided below     Return to GI Clinic in 3 months to review your progress.    ______________________________________________________________________    How do I schedule labs, imaging studies, or procedures " that were ordered in clinic today?     Labs: To schedule lab appointment at the [facility], use my chart or call [phone]. If you have a Home lab closer to home where you are regularly seen you can give them a call.     Procedures: If a colonoscopy, upper endoscopy, breath test, esophageal manometry, or pH impedence was ordered today, our endoscopy team will call you to schedule this. If you have not heard from our endoscopy team within a week, please call [phone] to schedule.     Imaging Studies: If you were scheduled for a CT scan, X-ray, MRI, ultrasound, HIDA scan or other imaging study, please call [phone] to have this scheduled.     Referral: If a referral to another specialty was ordered, expect a phone call or follow instructions above. If you have not heard from anyone regarding your referral in a week, please call our clinic to check the status.     Who do I call with any questions after my visit?  Please be in touch if there are any further questions that arise following today's visit.  There are multiple ways to contact your gastroenterology care team.      During business hours, you may reach a Gastroenterology nurse at [phone]    To schedule or reschedule an appointment, please call [phone].     You can always send a secure message through [employer].  [employer] messages are answered by your nurse or doctor typically within 24 hours.  Please allow extra time on weekends and holidays.      For urgent/emergent questions after business hours, you may reach the on-call GI Fellow by contacting the [facility]  at [phone].     How will I get the results of any tests ordered?    You will receive all of your results.  If you have signed up for [employer], any tests ordered at your visit will be available to you after your provider reviews them.  Typically this takes 1-2 weeks.  If there are urgent results that require a change in your care plan, your  provider or nurse will call you to discuss the next steps.      What is [employer]?  [employer] is a secure way for you to access all of your healthcare records from the [facility].  It is a web based computer program, so you can sign on to it from any location.  It also allows you to send secure messages to your care team.  I recommend signing up for [employer] access if you have not already done so and are comfortable with using a computer.      How to I schedule a follow-up visit?  If you did not schedule a follow-up visit today, please call [phone] to schedule a follow-up office visit.      Sincerely,    Marylou Ernandez PA-C  Division of Gastroenterology, Hepatology & Nutrition  [facility]

## 2024-04-09 NOTE — LETTER
4/9/2024         RE: Melba Galvan  [address] [place] [address]        Dear Colleague,    Thank you for referring your patient, Melba Galvan, to the [facility] GASTROENTEROLOGY CLINIC [place]. Please see a copy of my visit note below.    GI CLINIC VISIT    CC/REFERRING MD:  Gloria Gandhi  REASON FOR CONSULTATION: bloating, flatulence,    ASSESSMENT/PLAN:  #Bloating  #Flatulence  #Abdominal Pain, lower abdomen  Patient with 3 months of progressively worsening constant abdominal bloating and foul-smelling flatulence with associated lower abdominal/pelvic pain/cramping and new onset left lower sided more sharp pain.  No recent changes/factors to account for this aside from maybe changes in stress level.  No alarm features.  Differential remains broad and includes celiac, DGBI/functional bloating with recent increases in stress, lactose intolerance, fructose malabsorption, other carbohydrate malabsorption, SIBO, biliary/pancreatic etiology, GYN pathology though this would not account for foul gas,possible stool burden though no recent changes and per discussion no signs of constipation.  We will start with getting labs for celiac, reports that she is been eating pasta daily for past week and had only cut out for short time prior to that.  Will plan to get labs in the next week.  Will also pursue abdominal ultrasound to better assess gallbladder and other intra-abdominal organs.  If pain becomes severe would recommend CT in light of new or left-sided pain.  Would also recommend she be evaluated by GYN given lower pelvic discomfort and bloating, she is agreeable to this and will contact provider.  To help control symptoms discussed that she can try Beano.  For abdominal cramping can try Bentyl.  Future consideration include upper endoscopy, hydrogen breath testing, fructose malabsorption testing, abdominal x-ray for stool burden, fecal calprotectin, fecal elastase, retest of H. pylori, trial of low  FODMAP diet, enteric-coated peppermint, GI health psychology/hypnotherapy.    -- Get celiac serologies  -- Get abdominal ultrasound  -- Try Bean-o for gas  -- Try dicyclomine for abdominal cramping  -- Check in with OBGYN     Colorectal cancer screening: No personal or family history of colon cancer or advanced colon polyps, current guidelines recommend starting screening at age 45. Pursue sooner if symptoms change.      RTC 3 months    Thank you for this consultation.  It was a pleasure to participate in the care of this patient; please contact us with any further questions.     58 Minutes was spent on the date of the encounter during chart review, history and exam, documentation, and further activities as noted       Marylou Ernandez PA-C, RD  Division of Gastroenterology, Hepatology & Nutrition  [facility]        CANDELARIO Galvan is a 33 year old female with who presents for evaluation of bloating and flatulence. They are new to the [facility] GI clinic and this is my first encounter with the patient.  She is accompanied by her .    Saw PCP last week regarding bloating and flatulence. TSH, LFTs, BMP, CBC which was normal. Referred to GI for which she presents today.    Today Melba reports progressive worsening in abdominal bloating and foul-smelling flatus over the last 3 months.  This is associated with lower abdomen/pelvic discomfort that she describes as similar to menstrual cramps.  Today she also reports new lower left-sided pain.  Reports she wakes up with symptoms and they continue throughout the day, not necessarily changed by eating or bowel movements.  Feels she is distended, mostly lower abdomen.  Reports gas is frequent and foul, change in smell from previous.  She has tried to change her diet including cutting out dairy which she initially thought was helpful for about a week but symptoms persisted and she has reintroduced without change.  She also tried cutting out gluten, however no  change.  Again having gluten in soup daily.  She tried Gas-X but this was not helpful.    No changes with her menstrual cycle.     No medication or supplement changes in the last 3 months.  No travel.  No dietary changes.    She does note increased stress over the last 6 months, possibly more notable in the last 3 between current war, school, caring for her 2-year-old.  She does have a therapist that she sees regularly.    Bowel pattern has remained stable.  No constipation or diarrhea.  Typically has 2 soft formed stools per day ([place] 4) without straining, feels fully empty.  No urgency.  No blood in stool.    No nausea or vomiting.  No epigastric pain.  No reflux or heartburn.    Recently overall appetite is down.  No weight changes.  Eats whole food based diet, minimal eating out.    She does have history of H. pylori which she reports was treated, I do not have confirmation of eradication testing.  She reports that this feels much different than when she was experiencing H. pylori.    DIET RECALL:  Lunch: salad, chicken grilled, rice,   Dinner: soup of potatoes, onions, garlic, peas, noodles  Snacks: nuts and dark chcolate  Drinks: lactose free milk, water      ROS:    No fevers or chills  No weight loss  No blurry vision, double vision or change in vision  No sore throat  No lymphadenopathy  No headache, paraesthesias, or weakness in a limb  No shortness of breath or wheezing  No chest pain or pressure  No arthralgias or myalgias  No rashes or skin changes  No odynophagia or dysphagia  No BRBPR, hematochezia, melena  No dysuria, frequency or urgency  No hot/cold intolerance or polyria  No anxiety or depression      PROBLEM LIST  There are no problems to display for this patient.      PERTINENT PAST MEDICAL HISTORY:  Past Medical History:   Diagnosis Date    IUD (intrauterine device) in place     Mirena inserted 05/19/23       PREVIOUS SURGERIES:  Past Surgical History:   Procedure Laterality Date    NO  HISTORY OF SURGERY         PREVIOUS ENDOSCOPY:  EGD at 6-7 for GERD, avoid oily food, TUMS    ALLERGIES:   No Known Allergies    PERTINENT MEDICATIONS:    Current Outpatient Medications:     levonorgestrel (MIRENA) 20 MCG/DAY IUD, 1 each (20 mcg) by Intrauterine route once, Disp: , Rfl:     multivitamin w/minerals (MULTI-VITAMIN) tablet, Take 1 tablet by mouth daily, Disp: , Rfl:     Omega 3-6-9 Fatty Acids (OMEGA 3-6-9 PO), , Disp: , Rfl:     Current Facility-Administered Medications:     4 mL ropivacaine (NAROPIN) injection 5 mg/mL, 4 mL, , , Yeo, Albert, MD, 4 mL at 10/03/23 1609    methylPREDNISolone (DEPO-Medrol) injection 40 mg, 40 mg, , , Yeo, Albert, MD, 40 mg at 10/03/23 1609   No other NSAID/anticoagulation reported by patient.   No other OTC/herbal/supplements reported by patient.        SOCIAL HISTORY:    Tobacco: no  Alcohol: no  Drugs Use: no      Social History     Socioeconomic History    Marital status:      Spouse name: Not on file    Number of children: Not on file    Years of education: Not on file    Highest education level: Not on file   Occupational History    Not on file   Tobacco Use    Smoking status: Former     Packs/day: 0.50     Years: 10.00     Additional pack years: 0.00     Total pack years: 5.00     Types: Hookah, Cigarettes     Start date: 9/1/2010     Quit date: 9/1/2020     Years since quitting: 3.6    Smokeless tobacco: Never   Vaping Use    Vaping Use: Never used   Substance and Sexual Activity    Alcohol use: Never    Drug use: Never    Sexual activity: Yes     Partners: Male     Birth control/protection: I.U.D.     Comment: Mirena inserted 05/19/23   Other Topics Concern    Parent/sibling w/ CABG, MI or angioplasty before 65F 55M? 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 Came here for pharmacist exam- fell in love with nature and staying     Social Determinants of Health     Financial Resource Strain: Low  Risk  (4/3/2024)    Financial Resource Strain     Within the past 12 months, have you or your family members you live with been unable to get utilities (heat, electricity) when it was really needed?: No   Food Insecurity: Low Risk  (4/3/2024)    Food Insecurity     Within the past 12 months, did you worry that your food would run out before you got money to buy more?: No     Within the past 12 months, did the food you bought just not last and you didn t have money to get more?: No   Transportation Needs: Low Risk  (4/3/2024)    Transportation Needs     Within the past 12 months, has lack of transportation kept you from medical appointments, getting your medicines, non-medical meetings or appointments, work, or from getting things that you need?: No   Physical Activity: Unknown (4/3/2024)    Exercise Vital Sign     Days of Exercise per Week: 5 days     Minutes of Exercise per Session: Not on file   Stress: Stress Concern Present (4/3/2024)    [employer] - Occupational Stress Questionnaire     Feeling of Stress : Rather much   Social Connections: Unknown (4/3/2024)    Social Connection and Isolation Panel [NHANES]     Frequency of Communication with Friends and Family: Not on file     Frequency of Social Gatherings with Friends and Family: Once a week     Attends Spiritism Services: Not on file     Active Member of Clubs or Organizations: Not on file     Attends Club or Organization Meetings: Not on file     Marital Status: Not on file   Interpersonal Safety: Low Risk  (12/11/2023)    Interpersonal Safety     Do you feel physically and emotionally safe where you currently live?: Yes     Within the past 12 months, have you been hit, slapped, kicked or otherwise physically hurt by someone?: No     Within the past 12 months, have you been humiliated or emotionally abused in other ways by your partner or ex-partner?: No   Housing Stability: Low Risk  (4/3/2024)    Housing Stability     Do you  have housing? : Yes     Are you worried about losing your housing?: No       FAMILY HISTORY:  FH of CRC: no  FH of IBD: no  Aunt with IBS  FH of celiac: no  No Colon/Panc/Esophageal/other GI CA. No IBD or Celiac Disease. No other Autoimmune, Liver, or Thyroid disease.    Family History   Problem Relation Age of Onset    No Known Problems Mother     No Known Problems Father        Past/family/social history reviewed and no changes    PHYSICAL EXAMINATION:  Constitutional: AAOx3, cooperative, pleasant, not dyspneic/diaphoretic, no acute distress  Vitals reviewed: LMP 04/02/2024 (Exact Date)   Wt:   Wt Readings from Last 2 Encounters:   04/03/24 69.4 kg (153 lb)   12/11/23 69.7 kg (153 lb 9.6 oz)      Eyes: Sclera anicteric/injected  Ears/nose/mouth/throat: Normal oropharynx without ulcers or exudate, mucus membranes moist, hearing intact  Neck: supple, thyroid normal size  CV: No edema  Respiratory: Unlabored breathing  Lymph: No axillary, submandibular, supraclavicular or inguinal lymphadenopathy  Abd:  Nondistended, +bs, no hepatosplenomegaly, slightly tender to palpation left lower quadrant, no peritoneal signs  Skin: warm, perfused, no jaundice  Psych: Normal affect  MSK: Normal gait      PERTINENT STUDIES:    Office Visit on 04/03/2024   Component Date Value Ref Range Status    TSH 04/03/2024 0.99  0.30 - 4.20 uIU/mL Final    Sodium 04/03/2024 139  135 - 145 mmol/L Final    Potassium 04/03/2024 4.2  3.4 - 5.3 mmol/L Final    Carbon Dioxide (CO2) 04/03/2024 26  22 - 29 mmol/L Final    Anion Gap 04/03/2024 10  7 - 15 mmol/L Final    Urea Nitrogen 04/03/2024 16.2  6.0 - 20.0 mg/dL Final    Creatinine 04/03/2024 0.81  0.51 - 0.95 mg/dL Final    GFR Estimate 04/03/2024 >90  >60 mL/min/1.73m2 Final    Calcium 04/03/2024 9.4  8.6 - 10.0 mg/dL Final    Chloride 04/03/2024 103  98 - 107 mmol/L Final    Glucose 04/03/2024 89  70 - 99 mg/dL Final    Alkaline Phosphatase 04/03/2024 57  40 - 150 U/L Final    AST 04/03/2024 19   0 - 45 U/L Final    ALT 04/03/2024 19  0 - 50 U/L Final    Protein Total 04/03/2024 6.7  6.4 - 8.3 g/dL Final    Albumin 04/03/2024 4.4  3.5 - 5.2 g/dL Final    Bilirubin Total 04/03/2024 0.3  <=1.2 mg/dL Final    WBC Count 04/03/2024 7.0  4.0 - 11.0 10e3/uL Final    RBC Count 04/03/2024 4.87  3.80 - 5.20 10e6/uL Final    Hemoglobin 04/03/2024 13.9  11.7 - 15.7 g/dL Final    Hematocrit 04/03/2024 40.8  35.0 - 47.0 % Final    MCV 04/03/2024 84  78 - 100 fL Final    MCH 04/03/2024 28.5  26.5 - 33.0 pg Final    MCHC 04/03/2024 34.1  31.5 - 36.5 g/dL Final    RDW 04/03/2024 13.2  10.0 - 15.0 % Final    Platelet Count 04/03/2024 236  150 - 450 10e3/uL Final         Again, thank you for allowing me to participate in the care of your patient.      Sincerely,    Marylou Ernandez PA-C

## 2024-04-10 RX ORDER — DICYCLOMINE HYDROCHLORIDE 10 MG/1
10 CAPSULE ORAL
Qty: 60 CAPSULE | Refills: 3 | Status: SHIPPED | OUTPATIENT
Start: 2024-04-10 | End: 2024-05-28

## 2024-04-11 ENCOUNTER — HOSPITAL ENCOUNTER (OUTPATIENT)
Dept: ULTRASOUND IMAGING | Facility: CLINIC | Age: 34
Discharge: HOME | End: 2024-04-11
Attending: DIETITIAN, REGISTERED | Admitting: DIETITIAN, REGISTERED
Payer: COMMERCIAL

## 2024-04-11 DIAGNOSIS — R14.0 ABDOMINAL BLOATING: ICD-10-CM

## 2024-04-11 PROCEDURE — 76700 US EXAM ABDOM COMPLETE: CPT

## 2024-04-13 ENCOUNTER — LAB (OUTPATIENT)
Dept: LAB | Facility: CLINIC | Age: 34
End: 2024-04-13
Payer: COMMERCIAL

## 2024-04-13 DIAGNOSIS — R14.0 ABDOMINAL BLOATING: ICD-10-CM

## 2024-04-13 PROCEDURE — 86364 TISS TRNSGLTMNASE EA IG CLAS: CPT

## 2024-04-13 PROCEDURE — 36415 COLL VENOUS BLD VENIPUNCTURE: CPT

## 2024-04-13 PROCEDURE — 82784 ASSAY IGA/IGD/IGG/IGM EACH: CPT

## 2024-04-13 PROCEDURE — 86258 DGP ANTIBODY EACH IG CLASS: CPT

## 2024-04-15 LAB — IGA SERPL-MCNC: 169 MG/DL (ref 84–499)

## 2024-04-17 LAB
GLIADIN IGA SER-ACNC: 2 U/ML
GLIADIN IGG SER-ACNC: <0.6 U/ML
TTG IGA SER-ACNC: 0.6 U/ML
TTG IGG SER-ACNC: <0.6 U/ML

## 2024-04-23 ENCOUNTER — OFFICE VISIT (OUTPATIENT)
Dept: OBGYN | Facility: CLINIC | Age: 34
End: 2024-04-23
Attending: NURSE PRACTITIONER
Payer: COMMERCIAL

## 2024-04-23 VITALS
SYSTOLIC BLOOD PRESSURE: 98 MMHG | HEIGHT: 67 IN | WEIGHT: 160 LBS | DIASTOLIC BLOOD PRESSURE: 61 MMHG | BODY MASS INDEX: 25.11 KG/M2 | HEART RATE: 87 BPM

## 2024-04-23 DIAGNOSIS — Z30.431 INTRAUTERINE DEVICE SURVEILLANCE: ICD-10-CM

## 2024-04-23 DIAGNOSIS — R10.2 PELVIC PAIN IN FEMALE: Primary | ICD-10-CM

## 2024-04-23 LAB
BACTERIAL VAGINOSIS VAG-IMP: NEGATIVE
CANDIDA DNA VAG QL NAA+PROBE: NOT DETECTED
CANDIDA GLABRATA / CANDIDA KRUSEI DNA: NOT DETECTED
T VAGINALIS DNA VAG QL NAA+PROBE: NOT DETECTED

## 2024-04-23 PROCEDURE — 99213 OFFICE O/P EST LOW 20 MIN: CPT | Performed by: NURSE PRACTITIONER

## 2024-04-23 PROCEDURE — 0352U MULTIPLEX VAGINAL PANEL BY PCR: CPT | Performed by: NURSE PRACTITIONER

## 2024-04-23 PROCEDURE — 99214 OFFICE O/P EST MOD 30 MIN: CPT | Performed by: NURSE PRACTITIONER

## 2024-04-23 NOTE — PROGRESS NOTES
Subjective:  Melba Galvan is a 33 yr old female, , who presents to clinic today for evaluation of abdominal/ pelvic pain:    Patient was seen by GI earlier this month for evaluation of abdominal pain, flatulence, and bloating with differentials of celiac, DGBI/ functional bloating with recent increases in stress, lactose intolerance, fructose malabsorption, other carbohydrate malabsorption, SIBO, biliary / pancreatic etiology, possible stool burden, or GYN pathology; their plan for care was: 1) celiac serologies, 2) abdominal ultrasound (result was normal), 3) Trial of Bean-O for gas, 4) trial of dicyclomine for abdominal cramping and recommended to follow-up with ObGyn, which is why she is presenting for care today. She will see GI again in 3 months.      Symptoms are as follows:   - 3 months of progressive worsening constant abdominal bloating and foul-smelling flatus.  This is associated with lower abdomen/pelvic discomfort that she describes as similar to menstrual cramps.     - New lower left-sided sharp pain.  Feels like this has been increasing recently. Rates it as a 4/10 when at rest; with pressing in the area- becomes a 6/10.  Describes it as a tenderness. Feels like it is in the area of her ovary.   - Abdominal distension, mostly in lower abdomen. Endorses feeling a generalized pressure feeling in the middle of her pelvis. No trouble completely emptying her bladder. + urinary urgency and frequency which have been ongoing and worsen with stress. Denies vaginal pressure.     Symptoms have been thoroughly evaluated from a GI symptom/ food etiology - see their note.     - Contraception: Mirena IUD placement on 23. She experienced increase in cramping for the first 6 weeks after placement and then the pain completely resolved. Has not felt for her IUD strings.   - Sexually active; 1 lifetime partner; male; denies dyspareunia; denies risk for STDs  - No fam hx of ovarian cancer, breast or colon cancer  - however, she is unsure if her family has received preventive screenings as [place]  - Denies changes in vaginal itching, odor, and discharge. Hx of significant yeast infections that did not always have symptoms - treated in the [facility]    TSH, LFTs, BMP, and CBC have recently been done and were normal.    Last pap smear: 4/2021 NIL, HPV negative     Menstrual hx: Since IUD placement, pt has q20-22 day cycles, this shortened since IUD placement. Her left sided pain feels different from premenstrual cramping; has scant bleeding with the period and it only lasts 3 days.     No medication or supplement changes in the last 3 months.  No travel.  No dietary changes.     She does note increased stress over the last 6 months, possibly more notable in the last 3 between current war, school, caring for her 2-year-old.  She does have a therapist that she sees regularly.    Past Medical History:   Diagnosis Date    IUD (intrauterine device) in place     Mirena inserted 05/19/23     Past Surgical History:   Procedure Laterality Date    NO HISTORY OF SURGERY       Family History   Problem Relation Age of Onset    No Known Problems Mother     No Known Problems Father       No Known Allergies  Current Outpatient Medications   Medication Sig Dispense Refill    levonorgestrel (MIRENA) 20 MCG/DAY IUD 1 each (20 mcg) by Intrauterine route once      dicyclomine (BENTYL) 10 MG capsule Take 1 capsule (10 mg) by mouth 4 times daily (before meals and nightly) (Patient not taking: Reported on 4/23/2024) 60 capsule 3    multivitamin w/minerals (MULTI-VITAMIN) tablet Take 1 tablet by mouth daily (Patient not taking: Reported on 4/23/2024)      Omega 3-6-9 Fatty Acids (OMEGA 3-6-9 PO)  (Patient not taking: Reported on 4/23/2024)       Current Facility-Administered Medications   Medication Dose Route Frequency Provider Last Rate Last Admin    4 mL ropivacaine (NAROPIN) injection 5 mg/mL  4 mL   Yeo, Albert, MD   4 mL  "at 10/03/23 1609    methylPREDNISolone (DEPO-Medrol) injection 40 mg  40 mg   Yeo, Albert, MD   40 mg at 10/03/23 1609     Objective:  BP 98/61   Pulse 87   Ht 1.702 m (5' 7\")   Wt 72.6 kg (160 lb)   LMP 04/19/2024 (Exact Date)   BMI 25.06 kg/m    General: pleasant female in no acute distress  Psych: normal mentation, well oriented  Respiratory:  Unlabored breathing  Musculoskeletal: no gross deformities  Pelvic Exam:  Vulva: No external lesions, normal hair distribution, no adenopathy  Vagina: Moist, pink, no abnormal discharge, well rugated, no lesions  Cervix: parous, smooth, pink, no visible lesions; IUD strings extend 2-3cm from the external cervical os  Uterus: Normal size, anteverted, non-tender, mobile  Ovaries: L sided tenderness to palpation; R side without pain/ discomfort - No mass appreciated  Rectal exam: Normal anus    Assessment/ Plan:  1. Pelvic pain in female  2. IUD surveillance  Melba is undergoing evaluation of abdominal/ pelvic symptoms. Normal pelvic exam today except for tenderness in area of left adnexa. Normal IUD string check. Recommended:  - US Transvaginal Non OB; Future  - Multiplex Vaginal Panel by PCR  Pap smear is up to date (due 2026).    Pt will be notified of results when they are available and further plan for care determined at that time.    Pt expressed understanding and agreement with the plan for care.  Kathrin Reed, DNP, APRN, WHNP        "

## 2024-04-23 NOTE — PATIENT INSTRUCTIONS
Thank you for trusting us with your care!     If you need to contact us for questions about:  Symptoms, Scheduling & Medical Questions; Non-urgent (2-3 day response) Cameron message, Urgent (needing response today) [phone] (if after 3:30pm next day response)   Prescriptions: Please call your Pharmacy   Billing: [place] [phone] or [facility]:[phone]

## 2024-04-23 NOTE — LETTER
2024       RE: Melba Galvan  [address] [place] [address]     Dear Colleague,    Thank you for referring your patient, Melba Galvan, to the [facility] [place] at [facility]. Please see a copy of my visit note below.    Subjective:  Melba Galvan is a 33 yr old female, , who presents to clinic today for evaluation of abdominal/ pelvic pain:    Patient was seen by GI earlier this month for evaluation of abdominal pain, flatulence, and bloating with differentials of celiac, DGBI/ functional bloating with recent increases in stress, lactose intolerance, fructose malabsorption, other carbohydrate malabsorption, SIBO, biliary / pancreatic etiology, possible stool burden, or GYN pathology; their plan for care was: 1) celiac serologies, 2) abdominal ultrasound (result was normal), 3) Trial of Bean-O for gas, 4) trial of dicyclomine for abdominal cramping and recommended to follow-up with ObGyn, which is why she is presenting for care today. She will see GI again in 3 months.      Symptoms are as follows:   - 3 months of progressive worsening constant abdominal bloating and foul-smelling flatus.  This is associated with lower abdomen/pelvic discomfort that she describes as similar to menstrual cramps.     - New lower left-sided sharp pain.  Feels like this has been increasing recently. Rates it as a 4/10 when at rest; with pressing in the area- becomes a 6/10.  Describes it as a tenderness. Feels like it is in the area of her ovary.   - Abdominal distension, mostly in lower abdomen. Endorses feeling a generalized pressure feeling in the middle of her pelvis. No trouble completely emptying her bladder. + urinary urgency and frequency which have been ongoing and worsen with stress. Denies vaginal pressure.     Symptoms have been thoroughly evaluated from a GI symptom/ food etiology - see their note.     - Contraception: Mirena IUD placement  on 5/19/23. She experienced increase in cramping for the first 6 weeks after placement and then the pain completely resolved. Has not felt for her IUD strings.   - Sexually active; 1 lifetime partner; male; denies dyspareunia; denies risk for STDs  - No fam hx of ovarian cancer, breast or colon cancer - however, she is unsure if her family has received preventive screenings as [place]  - Denies changes in vaginal itching, odor, and discharge. Hx of significant yeast infections that did not always have symptoms - treated in the [facility]    TSH, LFTs, BMP, and CBC have recently been done and were normal.    Last pap smear: 4/2021 NIL, HPV negative     Menstrual hx: Since IUD placement, pt has q20-22 day cycles, this shortened since IUD placement. Her left sided pain feels different from premenstrual cramping; has scant bleeding with the period and it only lasts 3 days.     No medication or supplement changes in the last 3 months.  No travel.  No dietary changes.     She does note increased stress over the last 6 months, possibly more notable in the last 3 between current war, school, caring for her 2-year-old.  She does have a therapist that she sees regularly.    Past Medical History:   Diagnosis Date    IUD (intrauterine device) in place     Mirena inserted 05/19/23     Past Surgical History:   Procedure Laterality Date    NO HISTORY OF SURGERY       Family History   Problem Relation Age of Onset    No Known Problems Mother     No Known Problems Father       No Known Allergies  Current Outpatient Medications   Medication Sig Dispense Refill    levonorgestrel (MIRENA) 20 MCG/DAY IUD 1 each (20 mcg) by Intrauterine route once      dicyclomine (BENTYL) 10 MG capsule Take 1 capsule (10 mg) by mouth 4 times daily (before meals and nightly) (Patient not taking: Reported on 4/23/2024) 60 capsule 3    multivitamin w/minerals (MULTI-VITAMIN) tablet Take 1 tablet by mouth daily (Patient not taking:  "Reported on 4/23/2024)      Omega 3-6-9 Fatty Acids (OMEGA 3-6-9 PO)  (Patient not taking: Reported on 4/23/2024)       Current Facility-Administered Medications   Medication Dose Route Frequency Provider Last Rate Last Admin    4 mL ropivacaine (NAROPIN) injection 5 mg/mL  4 mL   Yeo, Albert, MD   4 mL at 10/03/23 1609    methylPREDNISolone (DEPO-Medrol) injection 40 mg  40 mg   Yeo, Albert, MD   40 mg at 10/03/23 1609     Objective:  BP 98/61   Pulse 87   Ht 1.702 m (5' 7\")   Wt 72.6 kg (160 lb)   LMP 04/19/2024 (Exact Date)   BMI 25.06 kg/m    General: pleasant female in no acute distress  Psych: normal mentation, well oriented  Respiratory:  Unlabored breathing  Musculoskeletal: no gross deformities  Pelvic Exam:  Vulva: No external lesions, normal hair distribution, no adenopathy  Vagina: Moist, pink, no abnormal discharge, well rugated, no lesions  Cervix: parous, smooth, pink, no visible lesions; IUD strings extend 2-3cm from the external cervical os  Uterus: Normal size, anteverted, non-tender, mobile  Ovaries: L sided tenderness to palpation; R side without pain/ discomfort - No mass appreciated  Rectal exam: Normal anus    Assessment/ Plan:  1. Pelvic pain in female  2. IUD surveillance  Melba is undergoing evaluation of abdominal/ pelvic symptoms. Normal pelvic exam today except for tenderness in area of left adnexa. Normal IUD string check. Recommended:  - US Transvaginal Non OB; Future  - Multiplex Vaginal Panel by PCR  Pap smear is up to date (due 2026).    Pt will be notified of results when they are available and further plan for care determined at that time.    Pt expressed understanding and agreement with the plan for care.  Kathrin Reed, DNP, APRN, WHNP        "

## 2024-04-26 ENCOUNTER — ANCILLARY PROCEDURE (OUTPATIENT)
Dept: ULTRASOUND IMAGING | Facility: CLINIC | Age: 34
End: 2024-04-26
Attending: NURSE PRACTITIONER
Payer: COMMERCIAL

## 2024-04-26 DIAGNOSIS — R10.2 PELVIC PAIN IN FEMALE: ICD-10-CM

## 2024-04-26 PROCEDURE — 76830 TRANSVAGINAL US NON-OB: CPT

## 2024-04-29 ENCOUNTER — MYC MEDICAL ADVICE (OUTPATIENT)
Dept: OBGYN | Facility: CLINIC | Age: 34
End: 2024-04-29
Payer: COMMERCIAL

## 2024-05-02 DIAGNOSIS — N83.201 CYST OF RIGHT OVARY: Primary | ICD-10-CM

## 2024-05-20 ENCOUNTER — MYC MEDICAL ADVICE (OUTPATIENT)
Dept: GASTROENTEROLOGY | Facility: CLINIC | Age: 34
End: 2024-05-20
Payer: COMMERCIAL

## 2024-05-20 DIAGNOSIS — R10.32 ABDOMINAL PAIN, LEFT LOWER QUADRANT: Primary | ICD-10-CM

## 2024-05-20 DIAGNOSIS — R10.84 ABDOMINAL PAIN, GENERALIZED: ICD-10-CM

## 2024-05-20 NOTE — TELEPHONE ENCOUNTER
Per Marylou Ernandez-    Order MRE.     Schedule MRE for AFTER the ultrasound ordered by OB/GYN provider.     If findings on US explain pain symptoms, will plan to cancel the MRE.    Updated patient via [employer].

## 2024-05-21 DIAGNOSIS — R14.0 ABDOMINAL BLOATING: ICD-10-CM

## 2024-05-21 DIAGNOSIS — R10.84 ABDOMINAL PAIN, GENERALIZED: ICD-10-CM

## 2024-05-21 DIAGNOSIS — R10.32 ABDOMINAL PAIN, LEFT LOWER QUADRANT: Primary | ICD-10-CM

## 2024-05-22 ENCOUNTER — LAB (OUTPATIENT)
Dept: LAB | Facility: CLINIC | Age: 34
End: 2024-05-22
Payer: COMMERCIAL

## 2024-05-22 DIAGNOSIS — R14.0 ABDOMINAL BLOATING: ICD-10-CM

## 2024-05-22 DIAGNOSIS — R10.84 ABDOMINAL PAIN, GENERALIZED: ICD-10-CM

## 2024-05-22 PROCEDURE — 83993 ASSAY FOR CALPROTECTIN FECAL: CPT

## 2024-05-22 PROCEDURE — 87338 HPYLORI STOOL AG IA: CPT

## 2024-05-23 LAB — H PYLORI AG STL QL IA: NEGATIVE

## 2024-05-24 LAB — CALPROTECTIN STL-MCNT: 23.9 MG/KG (ref 0–49.9)

## 2024-05-28 ENCOUNTER — TELEPHONE (OUTPATIENT)
Dept: GASTROENTEROLOGY | Facility: CLINIC | Age: 34
End: 2024-05-28

## 2024-05-28 ENCOUNTER — OFFICE VISIT (OUTPATIENT)
Dept: GASTROENTEROLOGY | Facility: CLINIC | Age: 34
End: 2024-05-28
Attending: DIETITIAN, REGISTERED
Payer: COMMERCIAL

## 2024-05-28 VITALS
DIASTOLIC BLOOD PRESSURE: 65 MMHG | HEART RATE: 96 BPM | BODY MASS INDEX: 25.27 KG/M2 | OXYGEN SATURATION: 98 % | SYSTOLIC BLOOD PRESSURE: 96 MMHG | WEIGHT: 161 LBS | HEIGHT: 67 IN

## 2024-05-28 DIAGNOSIS — R14.0 ABDOMINAL BLOATING: ICD-10-CM

## 2024-05-28 DIAGNOSIS — R14.3 FLATULENCE: Primary | ICD-10-CM

## 2024-05-28 DIAGNOSIS — R10.84 ABDOMINAL PAIN, GENERALIZED: ICD-10-CM

## 2024-05-28 DIAGNOSIS — R10.32 ABDOMINAL PAIN, LEFT LOWER QUADRANT: ICD-10-CM

## 2024-05-28 PROCEDURE — 99215 OFFICE O/P EST HI 40 MIN: CPT | Performed by: DIETITIAN, REGISTERED

## 2024-05-28 ASSESSMENT — PAIN SCALES - GENERAL: PAINLEVEL: SEVERE PAIN (6)

## 2024-05-28 NOTE — NURSING NOTE
"Chief Complaint   Patient presents with    Follow Up       Vitals:    05/28/24 1351   BP: 96/65   Pulse: 96   SpO2: 98%   Weight: 73 kg (161 lb)   Height: 1.702 m (5' 7\")       Body mass index is 25.22 kg/m .    [employer]    "

## 2024-05-28 NOTE — LETTER
5/28/2024         RE: Melba Galvan  [address] [place] [address]        Dear Colleague,    Thank you for referring your patient, Melba Galvan, to the [facility] [place]. Please see a copy of my visit note below.    GI CLINIC VISIT    CC/REFERRING MD:  Gloria Gandhi  REASON FOR CONSULTATION: bloating, flatulence,    ASSESSMENT/PLAN:  #Bloating  #Foul smelling stool and flatulence  #Abdominal Pain, lower abdomen  Patient with progressively worsening constant abdominal bloating and foul-smelling flatulence with associated lower abdominal/pelvic pain/cramping since earlier this year. This has now significantly improved. Reassuringly abdominal ultrasound normal.  Fecal calprotectin and H. pylori negative.  Celiac serologies normal. Possible lactose intolerance with improvement following elimination of dairy. Possible DGBI with possible relation to stress, reports stress now improved.  Other consideration include fructose malabsorption, other carbohydrate malabsorption, SIBO, pancreatic biliary etiology, Guynn pathology, stool burden though no signs of constipation.  Given improvements we will continue to monitor for now and continue workup more directed towards LLQ pain.  Given ongoing foul-smelling stool and flatus consider fecal elastase testing pending ongoing symptoms.  Could also consider hydrogen breath testing to assess for SIBO.  Workup with GYN has revealed right sided ovarian cyst with planned repeat ultrasound next month, recommend continued follow-up with this as well.  Workup of left lower quadrant pain per below.  -- If symptoms persist consider hydrogen breath and or versus fecal elastase  -- Follow-up with GYN for pelvic ultrasound next month    #LLQ Pain  Patient with worsening lower abdominal pain now localized to LLQ.  No improvement with Tylenol or Beano.  Worse with certain movements, lifting, after working out.  Abdominal exam revealed tenderness in  LLQ without peritoneal signs.  Possible muscle skeletal with relation to movements and working out, less suspicious for abdominal wall pain/trapped nerve with negative Carnett's sign.  Possible hernia though no bulge appreciated.  Possible diverticulitis, colitis/enteritis versus other intra-abdominal etiology.  We will proceed with CT A/P for further assessment as planned 5/30, pending findings proceed with MR enterography planned for 6/11/2024.  Recommend that she abstain from working out, lifting for at least the next week to see if this improves symptoms.  She is agreeable.  For pain recommend heat and Tylenol.  Will continue to follow closely.  -- Get CT scan done 5/30/24  -- Tentative plan for MRI on 6/11/24  -- Take a full week off from working out and lifting  -- Try heat for pain  -- Ok to take tylenol if helpful     Colorectal cancer screening: No personal or family history of colon cancer or advanced colon polyps, current guidelines recommend starting screening at age 45. Pursue sooner if symptoms change.      RTC 3 months    Thank you for this consultation.  It was a pleasure to participate in the care of this patient; please contact us with any further questions.     53 Minutes was spent on the date of the encounter during chart review, history and exam, documentation, and further activities as noted       Marylou Ernandez PA-C, RD  Division of Gastroenterology, Hepatology & Nutrition  [facility]        CANDELARIO Galvan is a 33 year old female with prior history of H. pylori who presents for follow up of bloating and flatulence and now worsening LLQ pain.    Melba initially presented to GI clinic last month for progressive worsening in abdominal bloating and foul-smelling flatus since early 2014  This was associated with lower abdomen/pelvic discomfort that she describes as similar to menstrual cramps. TSH, LFTs, BMP, CBC which was normal. At last visit she also noted new lower left-sided pain.   Would wakes up with symptoms and they continue throughout the day, not necessarily changed by eating or bowel movements.  Feels she is distended, mostly lower abdomen.  Reports gas is frequent and foul, change in smell from previous.  She had tried to changing her diet including cutting out dairy which she initially thought was helpful.  She also tried cutting out gluten, however no change.  She tried Gas-X but this was not helpful. No changes with her menstrual cycle.  No medication or supplement changes in the last 3 months.  No travel.      After last visit Melba completed celiac serologies which were negative. Abdominal US 4/11/24 normal, normal gallbladder, bile ducts, liver. Pelvic US 4/26/24 showed right ovarian cyst, possible right corpus leutal hemmorhatgic cyst, no findings on left. She messaged with worsening LLQ pain so we proceeding with h.pylori stool antigen testing which was negative and fecal calprotectin which was normal. We have CT A/P planned in 2 days.    Today she reports that abdominal bloating and gas has significantly improved.  Bloating nearly resolved, gas much less, still foul-smelling when occurs.  Has been able to broaden diet.  Notes stress has decreased, wonders if this was may be playing a role previously.  Her main concern is worsening left lower quadrant pain  Reports this has become more localized and sharp, constant never goes away but varies in intensity.  Was bad over the weekend, up to 8 out of 10.  No radiation.  No improvement with bowel movements.  Increased pain with tensing abdomen to urinate or defecate.   Has tried Bentyl and Tylenol without improvement.  She does report that pressure seems to help, has hand over the area frequently.  Laying on stomach seems to worsen as does working out and lifting her child.  Worse towards the end of the day after she has been active.    Has  works out daily.  Took 2 days off and symptoms lessened.  No palpable bulge  but wonders if she might have a hernia.    Stools have remained largely stable throughout varying symptoms.  Typically 2-3 soft fluffy stools per day.  No urgency.  No blood or melena.  Typically feels fully evacuated.  No straining.  Reports stools are more foul-smelling, different than previous.  Similar to gas.    No nausea or vomiting.  No epigastric pain.  No reflux or heartburn.  No weight changes.  Eats whole food based diet, minimal eating out.      ROS:    No fevers or chills  No weight loss  No blurry vision, double vision or change in vision  No sore throat  No lymphadenopathy  No headache, paraesthesias, or weakness in a limb  No shortness of breath or wheezing  No chest pain or pressure  No arthralgias or myalgias  No rashes or skin changes  No odynophagia or dysphagia  No BRBPR, hematochezia, melena  No dysuria, frequency or urgency  No hot/cold intolerance or polyria  No anxiety or depression      PROBLEM LIST  There are no problems to display for this patient.      PERTINENT PAST MEDICAL HISTORY:  Past Medical History:   Diagnosis Date    IUD (intrauterine device) in place     Mirena inserted 05/19/23       PREVIOUS SURGERIES:  Past Surgical History:   Procedure Laterality Date    NO HISTORY OF SURGERY         PREVIOUS ENDOSCOPY:  EGD at [facility] for GERD, avoid oily food, TUMS    ALLERGIES:   No Known Allergies    PERTINENT MEDICATIONS:    Current Outpatient Medications:     levonorgestrel (MIRENA) 20 MCG/DAY IUD, 1 each (20 mcg) by Intrauterine route once, Disp: , Rfl:     dicyclomine (BENTYL) 10 MG capsule, Take 1 capsule (10 mg) by mouth 4 times daily (before meals and nightly) (Patient not taking: Reported on 4/23/2024), Disp: 60 capsule, Rfl: 3    multivitamin w/minerals (MULTI-VITAMIN) tablet, Take 1 tablet by mouth daily (Patient not taking: Reported on 4/23/2024), Disp: , Rfl:     Omega 3-6-9 Fatty Acids (OMEGA 3-6-9 PO), , Disp: , Rfl:     Current Facility-Administered Medications:     4 mL  ropivacaine (NAROPIN) injection 5 mg/mL, 4 mL, , , Yeo, Albert, MD, 4 mL at 10/03/23 1609    methylPREDNISolone (DEPO-Medrol) injection 40 mg, 40 mg, , , Yeo, Albert, MD, 40 mg at 10/03/23 1609   No other NSAID/anticoagulation reported by patient.   No other OTC/herbal/supplements reported by patient.        SOCIAL HISTORY:    Tobacco: no  Alcohol: no  Drugs Use: no      Social History     Socioeconomic History    Marital status:      Spouse name: Not on file    Number of children: Not on file    Years of education: Not on file    Highest education level: Not on file   Occupational History    Not on file   Tobacco Use    Smoking status: Former     Current packs/day: 0.00     Average packs/day: 0.5 packs/day for 10.0 years (5.0 ttl pk-yrs)     Types: Hookah, Cigarettes     Start date: 9/1/2010     Quit date: 9/1/2020     Years since quitting: 3.7    Smokeless tobacco: Never   Vaping Use    Vaping status: Never Used   Substance and Sexual Activity    Alcohol use: Never    Drug use: Never    Sexual activity: Yes     Partners: Male     Birth control/protection: I.U.D.     Comment: Mirena inserted 05/19/23   Other Topics Concern    Parent/sibling w/ CABG, MI or angioplasty before 65F 55M? 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 Came here for pharmacist exam- fell in love with nature and staying     Social Determinants of Health     Financial Resource Strain: Low Risk  (4/3/2024)    Financial Resource Strain     Within the past 12 months, have you or your family members you live with been unable to get utilities (heat, electricity) when it was really needed?: No   Food Insecurity: Low Risk  (4/3/2024)    Food Insecurity     Within the past 12 months, did you worry that your food would run out before you got money to buy more?: No     Within the past 12 months, did the food you bought just not last and you didn t have money to get more?: No    Transportation Needs: Low Risk  (4/3/2024)    Transportation Needs     Within the past 12 months, has lack of transportation kept you from medical appointments, getting your medicines, non-medical meetings or appointments, work, or from getting things that you need?: No   Physical Activity: Unknown (4/3/2024)    Exercise Vital Sign     Days of Exercise per Week: 5 days     Minutes of Exercise per Session: Not on file   Stress: Stress Concern Present (4/3/2024)    [employer] [employer] - Occupational Stress Questionnaire     Feeling of Stress : Rather much   Social Connections: Unknown (4/3/2024)    Social Connection and Isolation Panel [NHANES]     Frequency of Communication with Friends and Family: Not on file     Frequency of Social Gatherings with Friends and Family: Once a week     Attends Orthodoxy Services: Not on file     Active Member of Clubs or Organizations: Not on file     Attends Club or Organization Meetings: Not on file     Marital Status: Not on file   Interpersonal Safety: Low Risk  (12/11/2023)    Interpersonal Safety     Do you feel physically and emotionally safe where you currently live?: Yes     Within the past 12 months, have you been hit, slapped, kicked or otherwise physically hurt by someone?: No     Within the past 12 months, have you been humiliated or emotionally abused in other ways by your partner or ex-partner?: No   Housing Stability: Low Risk  (4/3/2024)    Housing Stability     Do you have housing? : Yes     Are you worried about losing your housing?: No       FAMILY HISTORY:  FH of CRC: no  FH of IBD: no  Aunt with IBS  FH of celiac: no  No Colon/Panc/Esophageal/other GI CA. No IBD or Celiac Disease. No other Autoimmune, Liver, or Thyroid disease.    Family History   Problem Relation Age of Onset    No Known Problems Mother     No Known Problems Father        Past/family/social history reviewed and no changes    PHYSICAL EXAMINATION:  Constitutional: AAOx3,  "cooperative, pleasant, not dyspneic/diaphoretic, no acute distress  Vitals reviewed: BP 96/65   Pulse 96   Ht 1.702 m (5' 7\")   Wt 73 kg (161 lb)   LMP 04/19/2024 (Exact Date)   SpO2 98%   BMI 25.22 kg/m    Wt:   Wt Readings from Last 2 Encounters:   05/28/24 73 kg (161 lb)   04/23/24 72.6 kg (160 lb)      Eyes: Sclera anicteric/injected  Ears/nose/mouth/throat: Normal oropharynx without ulcers or exudate, mucus membranes moist, hearing intact  Neck: supple, thyroid normal size  CV: No edema  Respiratory: Unlabored breathing  Lymph: No axillary, submandibular, supraclavicular or inguinal lymphadenopathy  Abd:  Nondistended, +bs, no hepatosplenomegaly, slightly tender to palpation left lower quadrant, no peritoneal signs  Skin: warm, perfused, no jaundice  Psych: Normal affect  MSK: Normal gait      PERTINENT STUDIES:  Celiac serologies negative  H.pylori negataive  Fecal calprotectin normal    Office Visit on 04/03/2024   Component Date Value Ref Range Status    TSH 04/03/2024 0.99  0.30 - 4.20 uIU/mL Final    Sodium 04/03/2024 139  135 - 145 mmol/L Final    Potassium 04/03/2024 4.2  3.4 - 5.3 mmol/L Final    Carbon Dioxide (CO2) 04/03/2024 26  22 - 29 mmol/L Final    Anion Gap 04/03/2024 10  7 - 15 mmol/L Final    Urea Nitrogen 04/03/2024 16.2  6.0 - 20.0 mg/dL Final    Creatinine 04/03/2024 0.81  0.51 - 0.95 mg/dL Final    GFR Estimate 04/03/2024 >90  >60 mL/min/1.73m2 Final    Calcium 04/03/2024 9.4  8.6 - 10.0 mg/dL Final    Chloride 04/03/2024 103  98 - 107 mmol/L Final    Glucose 04/03/2024 89  70 - 99 mg/dL Final    Alkaline Phosphatase 04/03/2024 57  40 - 150 U/L Final    AST 04/03/2024 19  0 - 45 U/L Final    ALT 04/03/2024 19  0 - 50 U/L Final    Protein Total 04/03/2024 6.7  6.4 - 8.3 g/dL Final    Albumin 04/03/2024 4.4  3.5 - 5.2 g/dL Final    Bilirubin Total 04/03/2024 0.3  <=1.2 mg/dL Final    WBC Count 04/03/2024 7.0  4.0 - 11.0 10e3/uL Final    RBC Count 04/03/2024 4.87  3.80 - 5.20 10e6/uL " Final    Hemoglobin 04/03/2024 13.9  11.7 - 15.7 g/dL Final    Hematocrit 04/03/2024 40.8  35.0 - 47.0 % Final    MCV 04/03/2024 84  78 - 100 fL Final    MCH 04/03/2024 28.5  26.5 - 33.0 pg Final    MCHC 04/03/2024 34.1  31.5 - 36.5 g/dL Final    RDW 04/03/2024 13.2  10.0 - 15.0 % Final    Platelet Count 04/03/2024 236  150 - 450 10e3/uL Final     Abomoninal US 4/11/24:  IMPRESSION:  1.  Normal complete abdominal ultrasound.        Again, thank you for allowing me to participate in the care of your patient.      Sincerely,    Marylou Ernandez PA-C

## 2024-05-28 NOTE — TELEPHONE ENCOUNTER
Patient confirmed scheduled appointment:  Date: 9/3/24  Time: 9 am  Visit type: RET GI  Provider: Marylou Ernandez  Location: [facility] - 4th floor  Testing/imaging: n/a  Additional notes: n/a

## 2024-05-28 NOTE — PATIENT INSTRUCTIONS
It was a pleasure taking care of you today.  I've included a brief summary of our discussion and care plan from today's visit below.  Please review this information with your primary care provider.  ______________________________________________________________________    My recommendations are summarized as follows:  -- Get CT scan done 5/30/24  -- Tentative plan for MRI on 6/11/24  -- Follow up pelvic ultrasound 6/7/24  -- Let me know if bloating comes back and we can do breath test or stool test for pancreatic insufficency  -- Take a full week off from working out and lifting  -- Try heat for pain  -- Ok to take tylenol if helpful    -- please see scheduling information provided below     Return to GI Clinic in 3 months to review your progress.    ______________________________________________________________________    How do I schedule labs, imaging studies, or procedures that were ordered in clinic today?     Labs: To schedule lab appointment at the [facility], use my chart or call [phone]. If you have a [facility] lab closer to home where you are regularly seen you can give them a call.     Procedures: If a colonoscopy, upper endoscopy, breath test, esophageal manometry, or pH impedence was ordered today, our endoscopy team will call you to schedule this. If you have not heard from our endoscopy team within a week, please call [phone] to schedule.     Imaging Studies: If you were scheduled for a CT scan, X-ray, MRI, ultrasound, HIDA scan or other imaging study, please call [phone] to have this scheduled.     Referral: If a referral to another specialty was ordered, expect a phone call or follow instructions above. If you have not heard from anyone regarding your referral in a week, please call our clinic to check the status.     Who do I call with any questions after my visit?  Please be in touch if there are any further questions that arise following today's visit.  There are  multiple ways to contact your gastroenterology care team.      During business hours, you may reach a Gastroenterology nurse at [phone]    To schedule or reschedule an appointment, please call [phone].     You can always send a secure message through [employer].  [employer] messages are answered by your nurse or doctor typically within 24 hours.  Please allow extra time on weekends and holidays.      For urgent/emergent questions after business hours, you may reach the on-call GI Fellow by contacting the [facility] at [phone].     How will I get the results of any tests ordered?    You will receive all of your results.  If you have signed up for [employer], any tests ordered at your visit will be available to you after your provider reviews them.  Typically this takes 1-2 weeks.  If there are urgent results that require a change in your care plan, your provider or nurse will call you to discuss the next steps.      What is [employer]?  [employer] is a secure way for you to access all of your healthcare records from the [facility].  It is a web based computer program, so you can sign on to it from any location.  It also allows you to send secure messages to your care team.  I recommend signing up for [employer] access if you have not already done so and are comfortable with using a computer.      How to I schedule a follow-up visit?  If you did not schedule a follow-up visit today, please call [phone] to schedule a follow-up office visit.      Sincerely,    Marylou Ernandez PA-C  Division of Gastroenterology, Hepatology & Nutrition  [facility]

## 2024-05-28 NOTE — PROGRESS NOTES
GI CLINIC VISIT    CC/REFERRING MD:  Gloria Gandhi  REASON FOR CONSULTATION: bloating, flatulence,    ASSESSMENT/PLAN:  #Bloating  #Foul smelling stool and flatulence  #Abdominal Pain, lower abdomen  Patient with progressively worsening constant abdominal bloating and foul-smelling flatulence with associated lower abdominal/pelvic pain/cramping since earlier this year. This has now significantly improved. Reassuringly abdominal ultrasound normal.  Fecal calprotectin and H. pylori negative.  Celiac serologies normal. Possible lactose intolerance with improvement following elimination of dairy. Possible DGBI with possible relation to stress, reports stress now improved.  Other consideration include fructose malabsorption, other carbohydrate malabsorption, SIBO, pancreatic biliary etiology, Guynn pathology, stool burden though no signs of constipation.  Given improvements we will continue to monitor for now and continue workup more directed towards LLQ pain.  Given ongoing foul-smelling stool and flatus consider fecal elastase testing pending ongoing symptoms.  Could also consider hydrogen breath testing to assess for SIBO.  Workup with GYN has revealed right sided ovarian cyst with planned repeat ultrasound next month, recommend continued follow-up with this as well.  Workup of left lower quadrant pain per below.  -- If symptoms persist consider hydrogen breath and or versus fecal elastase  -- Follow-up with GYN for pelvic ultrasound next month    #LLQ Pain  Patient with worsening lower abdominal pain now localized to LLQ.  No improvement with Tylenol or Beano.  Worse with certain movements, lifting, after working out.  Abdominal exam revealed tenderness in LLQ without peritoneal signs.  Possible muscle skeletal with relation to movements and working out, less suspicious for abdominal wall pain/trapped nerve with negative Carnett's sign.  Possible hernia though no bulge appreciated.  Possible diverticulitis,  colitis/enteritis versus other intra-abdominal etiology.  We will proceed with CT A/P for further assessment as planned 5/30, pending findings proceed with MR enterography planned for 6/11/2024.  Recommend that she abstain from working out, lifting for at least the next week to see if this improves symptoms.  She is agreeable.  For pain recommend heat and Tylenol.  Will continue to follow closely.  -- Get CT scan done 5/30/24  -- Tentative plan for MRI on 6/11/24  -- Take a full week off from working out and lifting  -- Try heat for pain  -- Ok to take tylenol if helpful     Colorectal cancer screening: No personal or family history of colon cancer or advanced colon polyps, current guidelines recommend starting screening at age 45. Pursue sooner if symptoms change.      RTC 3 months    Thank you for this consultation.  It was a pleasure to participate in the care of this patient; please contact us with any further questions.     53 Minutes was spent on the date of the encounter during chart review, history and exam, documentation, and further activities as noted       Marylou Ernandez PA-C, RD  Division of Gastroenterology, Hepatology & Nutrition  [facility]        CANDELARIO Galvan is a 33 year old female with prior history of H. pylori who presents for follow up of bloating and flatulence and now worsening LLQ pain.    Melba initially presented to GI clinic last month for progressive worsening in abdominal bloating and foul-smelling flatus since early 2014  This was associated with lower abdomen/pelvic discomfort that she describes as similar to menstrual cramps. TSH, LFTs, BMP, CBC which was normal. At last visit she also noted new lower left-sided pain.  Would wakes up with symptoms and they continue throughout the day, not necessarily changed by eating or bowel movements.  Feels she is distended, mostly lower abdomen.  Reports gas is frequent and foul, change in smell from previous.  She had tried to  changing her diet including cutting out dairy which she initially thought was helpful.  She also tried cutting out gluten, however no change.  She tried Gas-X but this was not helpful. No changes with her menstrual cycle.  No medication or supplement changes in the last 3 months.  No travel.      After last visit Melba completed celiac serologies which were negative. Abdominal US 4/11/24 normal, normal gallbladder, bile ducts, liver. Pelvic US 4/26/24 showed right ovarian cyst, possible right corpus leutal hemmorhatgic cyst, no findings on left. She messaged with worsening LLQ pain so we proceeding with h.pylori stool antigen testing which was negative and fecal calprotectin which was normal. We have CT A/P planned in 2 days.    Today she reports that abdominal bloating and gas has significantly improved.  Bloating nearly resolved, gas much less, still foul-smelling when occurs.  Has been able to broaden diet.  Notes stress has decreased, wonders if this was may be playing a role previously.  Her main concern is worsening left lower quadrant pain  Reports this has become more localized and sharp, constant never goes away but varies in intensity.  Was bad over the weekend, up to 8 out of 10.  No radiation.  No improvement with bowel movements.  Increased pain with tensing abdomen to urinate or defecate.   Has tried Bentyl and Tylenol without improvement.  She does report that pressure seems to help, has hand over the area frequently.  Laying on stomach seems to worsen as does working out and lifting her child.  Worse towards the end of the day after she has been active.    Has  works out daily.  Took 2 days off and symptoms lessened.  No palpable bulge but wonders if she might have a hernia.    Stools have remained largely stable throughout varying symptoms.  Typically 2-3 soft fluffy stools per day.  No urgency.  No blood or melena.  Typically feels fully evacuated.  No straining.  Reports stools are  more foul-smelling, different than previous.  Similar to gas.    No nausea or vomiting.  No epigastric pain.  No reflux or heartburn.  No weight changes.  Eats whole food based diet, minimal eating out.      ROS:    No fevers or chills  No weight loss  No blurry vision, double vision or change in vision  No sore throat  No lymphadenopathy  No headache, paraesthesias, or weakness in a limb  No shortness of breath or wheezing  No chest pain or pressure  No arthralgias or myalgias  No rashes or skin changes  No odynophagia or dysphagia  No BRBPR, hematochezia, melena  No dysuria, frequency or urgency  No hot/cold intolerance or polyria  No anxiety or depression      PROBLEM LIST  There are no problems to display for this patient.      PERTINENT PAST MEDICAL HISTORY:  Past Medical History:   Diagnosis Date    IUD (intrauterine device) in place     Mirena inserted 05/19/23       PREVIOUS SURGERIES:  Past Surgical History:   Procedure Laterality Date    NO HISTORY OF SURGERY         PREVIOUS ENDOSCOPY:  EGD at [facility] for GERD, avoid oily food, TUMS    ALLERGIES:   No Known Allergies    PERTINENT MEDICATIONS:    Current Outpatient Medications:     levonorgestrel (MIRENA) 20 MCG/DAY IUD, 1 each (20 mcg) by Intrauterine route once, Disp: , Rfl:     dicyclomine (BENTYL) 10 MG capsule, Take 1 capsule (10 mg) by mouth 4 times daily (before meals and nightly) (Patient not taking: Reported on 4/23/2024), Disp: 60 capsule, Rfl: 3    multivitamin w/minerals (MULTI-VITAMIN) tablet, Take 1 tablet by mouth daily (Patient not taking: Reported on 4/23/2024), Disp: , Rfl:     Omega 3-6-9 Fatty Acids (OMEGA 3-6-9 PO), , Disp: , Rfl:     Current Facility-Administered Medications:     4 mL ropivacaine (NAROPIN) injection 5 mg/mL, 4 mL, , , Yeo, Albert, MD, 4 mL at 10/03/23 1609    methylPREDNISolone (DEPO-Medrol) injection 40 mg, 40 mg, , , Yeo, Albert, MD, 40 mg at 10/03/23 1609   No other NSAID/anticoagulation reported by patient.   No other  OTC/herbal/supplements reported by patient.        SOCIAL HISTORY:    Tobacco: no  Alcohol: no  Drugs Use: no      Social History     Socioeconomic History    Marital status:      Spouse name: Not on file    Number of children: Not on file    Years of education: Not on file    Highest education level: Not on file   Occupational History    Not on file   Tobacco Use    Smoking status: Former     Current packs/day: 0.00     Average packs/day: 0.5 packs/day for 10.0 years (5.0 ttl pk-yrs)     Types: Hookah, Cigarettes     Start date: 9/1/2010     Quit date: 9/1/2020     Years since quitting: 3.7    Smokeless tobacco: Never   Vaping Use    Vaping status: Never Used   Substance and Sexual Activity    Alcohol use: Never    Drug use: Never    Sexual activity: Yes     Partners: Male     Birth control/protection: I.U.D.     Comment: Mirena inserted 05/19/23   Other Topics Concern    Parent/sibling w/ CABG, MI or angioplasty before 65F 55M? 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 Came here for pharmacist exam- fell in love with nature and staying     Social Determinants of Health     Financial Resource Strain: Low Risk  (4/3/2024)    Financial Resource Strain     Within the past 12 months, have you or your family members you live with been unable to get utilities (heat, electricity) when it was really needed?: No   Food Insecurity: Low Risk  (4/3/2024)    Food Insecurity     Within the past 12 months, did you worry that your food would run out before you got money to buy more?: No     Within the past 12 months, did the food you bought just not last and you didn t have money to get more?: No   Transportation Needs: Low Risk  (4/3/2024)    Transportation Needs     Within the past 12 months, has lack of transportation kept you from medical appointments, getting your medicines, non-medical meetings or appointments, work, or from getting things that you need?:  "No   Physical Activity: Unknown (4/3/2024)    Exercise Vital Sign     Days of Exercise per Week: 5 days     Minutes of Exercise per Session: Not on file   Stress: Stress Concern Present (4/3/2024)    [employer] [place] [employer] - Occupational Stress Questionnaire     Feeling of Stress : Rather much   Social Connections: Unknown (4/3/2024)    Social Connection and Isolation Panel [NHANES]     Frequency of Communication with Friends and Family: Not on file     Frequency of Social Gatherings with Friends and Family: Once a week     Attends Jainism Services: Not on file     Active Member of Clubs or Organizations: Not on file     Attends Club or Organization Meetings: Not on file     Marital Status: Not on file   Interpersonal Safety: Low Risk  (12/11/2023)    Interpersonal Safety     Do you feel physically and emotionally safe where you currently live?: Yes     Within the past 12 months, have you been hit, slapped, kicked or otherwise physically hurt by someone?: No     Within the past 12 months, have you been humiliated or emotionally abused in other ways by your partner or ex-partner?: No   Housing Stability: Low Risk  (4/3/2024)    Housing Stability     Do you have housing? : Yes     Are you worried about losing your housing?: No       FAMILY HISTORY:  FH of CRC: no  FH of IBD: no  Aunt with IBS  FH of celiac: no  No Colon/Panc/Esophageal/other GI CA. No IBD or Celiac Disease. No other Autoimmune, Liver, or Thyroid disease.    Family History   Problem Relation Age of Onset    No Known Problems Mother     No Known Problems Father        Past/family/social history reviewed and no changes    PHYSICAL EXAMINATION:  Constitutional: AAOx3, cooperative, pleasant, not dyspneic/diaphoretic, no acute distress  Vitals reviewed: BP 96/65   Pulse 96   Ht 1.702 m (5' 7\")   Wt 73 kg (161 lb)   LMP 04/19/2024 (Exact Date)   SpO2 98%   BMI 25.22 kg/m    Wt:   Wt Readings from Last 2 Encounters:   05/28/24 73 kg " (161 lb)   04/23/24 72.6 kg (160 lb)      Eyes: Sclera anicteric/injected  Ears/nose/mouth/throat: Normal oropharynx without ulcers or exudate, mucus membranes moist, hearing intact  Neck: supple, thyroid normal size  CV: No edema  Respiratory: Unlabored breathing  Lymph: No axillary, submandibular, supraclavicular or inguinal lymphadenopathy  Abd:  Nondistended, +bs, no hepatosplenomegaly, slightly tender to palpation left lower quadrant, no peritoneal signs  Skin: warm, perfused, no jaundice  Psych: Normal affect  MSK: Normal gait      PERTINENT STUDIES:  Celiac serologies negative  H.pylori negataive  Fecal calprotectin normal    Office Visit on 04/03/2024   Component Date Value Ref Range Status    TSH 04/03/2024 0.99  0.30 - 4.20 uIU/mL Final    Sodium 04/03/2024 139  135 - 145 mmol/L Final    Potassium 04/03/2024 4.2  3.4 - 5.3 mmol/L Final    Carbon Dioxide (CO2) 04/03/2024 26  22 - 29 mmol/L Final    Anion Gap 04/03/2024 10  7 - 15 mmol/L Final    Urea Nitrogen 04/03/2024 16.2  6.0 - 20.0 mg/dL Final    Creatinine 04/03/2024 0.81  0.51 - 0.95 mg/dL Final    GFR Estimate 04/03/2024 >90  >60 mL/min/1.73m2 Final    Calcium 04/03/2024 9.4  8.6 - 10.0 mg/dL Final    Chloride 04/03/2024 103  98 - 107 mmol/L Final    Glucose 04/03/2024 89  70 - 99 mg/dL Final    Alkaline Phosphatase 04/03/2024 57  40 - 150 U/L Final    AST 04/03/2024 19  0 - 45 U/L Final    ALT 04/03/2024 19  0 - 50 U/L Final    Protein Total 04/03/2024 6.7  6.4 - 8.3 g/dL Final    Albumin 04/03/2024 4.4  3.5 - 5.2 g/dL Final    Bilirubin Total 04/03/2024 0.3  <=1.2 mg/dL Final    WBC Count 04/03/2024 7.0  4.0 - 11.0 10e3/uL Final    RBC Count 04/03/2024 4.87  3.80 - 5.20 10e6/uL Final    Hemoglobin 04/03/2024 13.9  11.7 - 15.7 g/dL Final    Hematocrit 04/03/2024 40.8  35.0 - 47.0 % Final    MCV 04/03/2024 84  78 - 100 fL Final    MCH 04/03/2024 28.5  26.5 - 33.0 pg Final    MCHC 04/03/2024 34.1  31.5 - 36.5 g/dL Final    RDW 04/03/2024 13.2  10.0 -  15.0 % Final    Platelet Count 04/03/2024 236  150 - 450 10e3/uL Final     Abomoninal US 4/11/24:  IMPRESSION:  1.  Normal complete abdominal ultrasound.

## 2024-05-30 ENCOUNTER — HOSPITAL ENCOUNTER (OUTPATIENT)
Dept: CT IMAGING | Facility: CLINIC | Age: 34
Discharge: HOME | End: 2024-05-30
Attending: DIETITIAN, REGISTERED | Admitting: DIETITIAN, REGISTERED
Payer: COMMERCIAL

## 2024-05-30 DIAGNOSIS — R14.0 ABDOMINAL BLOATING: ICD-10-CM

## 2024-05-30 DIAGNOSIS — R10.32 ABDOMINAL PAIN, LEFT LOWER QUADRANT: ICD-10-CM

## 2024-05-30 PROCEDURE — 74177 CT ABD & PELVIS W/CONTRAST: CPT

## 2024-05-30 PROCEDURE — 250N000011 HC RX IP 250 OP 636: Performed by: DIETITIAN, REGISTERED

## 2024-05-30 PROCEDURE — 250N000009 HC RX 250: Performed by: DIETITIAN, REGISTERED

## 2024-05-30 RX ORDER — IOPAMIDOL 755 MG/ML
79 INJECTION, SOLUTION INTRAVASCULAR ONCE
Status: COMPLETED | OUTPATIENT
Start: 2024-05-30 | End: 2024-05-30

## 2024-05-30 RX ADMIN — IOPAMIDOL 79 ML: 755 INJECTION, SOLUTION INTRAVENOUS at 10:46

## 2024-05-30 RX ADMIN — SODIUM CHLORIDE 66 ML: 9 INJECTION, SOLUTION INTRAVENOUS at 10:46

## 2024-06-02 ENCOUNTER — MYC MEDICAL ADVICE (OUTPATIENT)
Dept: GASTROENTEROLOGY | Facility: CLINIC | Age: 34
End: 2024-06-02
Payer: COMMERCIAL

## 2024-06-02 DIAGNOSIS — R10.84 ABDOMINAL PAIN, GENERALIZED: ICD-10-CM

## 2024-06-02 DIAGNOSIS — R14.3 FLATULENCE: ICD-10-CM

## 2024-06-02 DIAGNOSIS — R14.0 ABDOMINAL BLOATING: Primary | ICD-10-CM

## 2024-06-03 NOTE — TELEPHONE ENCOUNTER
Fecal elastase stool study and hydrogen breath test orders placed. Scheduling instructions sent via [employer].     Also sent Marylou's recommendations in regards to [employer] message to the patient.

## 2024-06-06 ENCOUNTER — LAB (OUTPATIENT)
Dept: LAB | Facility: CLINIC | Age: 34
End: 2024-06-06
Payer: COMMERCIAL

## 2024-06-06 DIAGNOSIS — R14.0 ABDOMINAL BLOATING: ICD-10-CM

## 2024-06-06 DIAGNOSIS — R10.84 ABDOMINAL PAIN, GENERALIZED: ICD-10-CM

## 2024-06-06 PROCEDURE — 82653 EL-1 FECAL QUANTITATIVE: CPT

## 2024-06-07 ENCOUNTER — ANCILLARY PROCEDURE (OUTPATIENT)
Dept: ULTRASOUND IMAGING | Facility: CLINIC | Age: 34
End: 2024-06-07
Attending: NURSE PRACTITIONER
Payer: COMMERCIAL

## 2024-06-07 DIAGNOSIS — N83.201 CYST OF RIGHT OVARY: ICD-10-CM

## 2024-06-07 LAB — ELASTASE PANC STL-MCNT: 242 UG/G

## 2024-06-07 PROCEDURE — 76856 US EXAM PELVIC COMPLETE: CPT

## 2024-06-07 PROCEDURE — 76830 TRANSVAGINAL US NON-OB: CPT

## 2024-06-24 ENCOUNTER — OFFICE VISIT (OUTPATIENT)
Dept: INTERNAL MEDICINE | Facility: CLINIC | Age: 34
End: 2024-06-24
Payer: COMMERCIAL

## 2024-06-24 VITALS
RESPIRATION RATE: 16 BRPM | HEART RATE: 90 BPM | TEMPERATURE: 98.4 F | DIASTOLIC BLOOD PRESSURE: 60 MMHG | BODY MASS INDEX: 25.29 KG/M2 | OXYGEN SATURATION: 98 % | WEIGHT: 161.5 LBS | SYSTOLIC BLOOD PRESSURE: 90 MMHG

## 2024-06-24 DIAGNOSIS — M79.10 MUSCLE TENSION PAIN: ICD-10-CM

## 2024-06-24 DIAGNOSIS — H69.93 DYSFUNCTION OF BOTH EUSTACHIAN TUBES: Primary | ICD-10-CM

## 2024-06-24 PROCEDURE — 99213 OFFICE O/P EST LOW 20 MIN: CPT | Performed by: PHYSICIAN ASSISTANT

## 2024-06-24 NOTE — PROGRESS NOTES
"  Assessment & Plan     Dysfunction of both eustachian tubes  Reviewed OTC steroid nasal spray or sudafed as needed for pressure feeling  No need for antibiotics     Muscle tension pain            BMI  Estimated body mass index is 25.29 kg/m  as calculated from the following:    Height as of 5/28/24: 1.702 m (5' 7\").    Weight as of this encounter: 73.3 kg (161 lb 8 oz).             Stephenie Reilly is a 33 year old, presenting for the following health issues:  Ear Problem    History of Present Illness       Reason for visit:  Ear pain  Symptom onset:  1-2 weeks ago  Symptom intensity:  Moderate  Symptom progression:  Staying the same  Had these symptoms before:  Yes  Has tried/received treatment for these symptoms:  Yes  Previous treatment was successful:  No  What makes it worse:  No  What makes it better:  No    She eats 2-3 servings of fruits and vegetables daily.She consumes 0 sweetened beverage(s) daily.She exercises with enough effort to increase her heart rate 30 to 60 minutes per day.  She exercises with enough effort to increase her heart rate 4 days per week.   She is taking medications regularly.     Bilateral ear pain. Pt did have her own supply of amoxicillin took that for 7 days when symptoms began two weeks ago and still having pain in both ears.                    Review of Systems  Constitutional, HEENT, cardiovascular, pulmonary, gi and gu systems are negative, except as otherwise noted.      Objective    BP 90/60 (BP Location: Left arm, Patient Position: Sitting, Cuff Size: Adult Regular)   Pulse 90   Temp 98.4  F (36.9  C) (Oral)   Resp 16   Wt 73.3 kg (161 lb 8 oz)   SpO2 98%   BMI 25.29 kg/m    Body mass index is 25.29 kg/m .  Physical Exam   GENERAL: alert and no distress  HENT: normal cephalic/atraumatic, both ears: slight serous fluid no redness purulent fluid noted, oropharynx clear, and oral mucous membranes moist  MS: mild tenderness TMJ area and at the corner of the jaw " bilaterally             Signed Electronically by: Janet Bran PA-C

## 2024-09-06 ENCOUNTER — TELEPHONE (OUTPATIENT)
Dept: GASTROENTEROLOGY | Facility: CLINIC | Age: 34
End: 2024-09-06
Payer: COMMERCIAL

## 2024-09-06 NOTE — TELEPHONE ENCOUNTER
LPN called patient to go over instructions for HBT. Patient stated that she had cancelled the procedure on Monday (9/2/24) over mychart. LPN informed patient that she was still showing on the schedule. Patient logged onto her mychart while on the phone and confirmed that the appointment was still on there. Patient cancelled HBT as she has to travel for work. Patient stated she will call to reschedule when she returns.    Jocelyne Macedo LPN

## 2024-10-01 ENCOUNTER — OFFICE VISIT (OUTPATIENT)
Dept: FAMILY MEDICINE | Facility: CLINIC | Age: 34
End: 2024-10-01
Payer: COMMERCIAL

## 2024-10-01 VITALS
WEIGHT: 169.2 LBS | TEMPERATURE: 98.3 F | HEART RATE: 97 BPM | SYSTOLIC BLOOD PRESSURE: 104 MMHG | RESPIRATION RATE: 16 BRPM | HEIGHT: 67 IN | BODY MASS INDEX: 26.56 KG/M2 | DIASTOLIC BLOOD PRESSURE: 69 MMHG | OXYGEN SATURATION: 96 %

## 2024-10-01 DIAGNOSIS — J02.9 SORE THROAT: Primary | ICD-10-CM

## 2024-10-01 DIAGNOSIS — H92.03 OTALGIA OF BOTH EARS: ICD-10-CM

## 2024-10-01 LAB
DEPRECATED S PYO AG THROAT QL EIA: NEGATIVE
GROUP A STREP BY PCR: NOT DETECTED

## 2024-10-01 PROCEDURE — 99213 OFFICE O/P EST LOW 20 MIN: CPT | Performed by: GENERAL PRACTICE

## 2024-10-01 PROCEDURE — 87651 STREP A DNA AMP PROBE: CPT | Performed by: GENERAL PRACTICE

## 2024-10-01 ASSESSMENT — PAIN SCALES - GENERAL: PAINLEVEL: EXTREME PAIN (8)

## 2024-10-01 NOTE — NURSING NOTE
"Chief Complaint   Patient presents with    Ear Problem     Initial /69 (BP Location: Right arm, Patient Position: Sitting, Cuff Size: Adult Regular)   Pulse 97   Temp 98.3  F (36.8  C) (Oral)   Resp 16   Ht 1.702 m (5' 7\")   Wt 76.7 kg (169 lb 3.2 oz)   SpO2 96%   BMI 26.50 kg/m   Estimated body mass index is 26.5 kg/m  as calculated from the following:    Height as of this encounter: 1.702 m (5' 7\").    Weight as of this encounter: 76.7 kg (169 lb 3.2 oz).  BP completed using cuff size regular right arm    Lisa Magill, CMA    "

## 2024-10-01 NOTE — PROGRESS NOTES
Assessment & Plan     Sore throat  No fever  No cough  Possible strep   - Streptococcus A Rapid Screen w/Reflex to PCR - Clinic Collect    Otalgia of both ears  No signs of infection today  Continue with tylenol PRN  If no improvement in 2-3 days, follow-up               Stephenie Reilly is a 34 year old, presenting for the following health issues:  Ear Problem        10/1/2024     1:45 PM   Additional Questions   Roomed by Lisa Magill, CMA   Accompanied by self         10/1/2024     1:45 PM   Patient Reported Additional Medications   Patient reports taking the following new medications NONE       Had COVID 3 weeks ago and recovered at home.   Daughter (3 y/o )has ear infection and was started on antibiotic.   Sore throat, ear pain, and joint pain. Ear pain since yesterday, constant 6/10.  Headache is improved with tylenol.   Massaging face helps with ear pain.   Been drinking hot/warm water, soup.   No fever, no cough  Takes nyquil and sleeps well at night    History of Present Illness       Reason for visit:  Tiredness and ear and throat pain  Symptom onset:  1-3 days ago  Symptoms include:  Pain in ears and throat  Symptom intensity:  Moderate  Symptom progression:  Staying the same  Had these symptoms before:  No  What makes it worse:  No  What makes it better:  Tylenol   She is taking medications regularly.           Acute Illness-Patient had COVID 3 weeks ago.      Acute illness concerns: ear pain and sore throat  Onset/Duration: 1 day ago  Symptoms:  Fever: No  Chills/Sweats: No  Headache (location?): YES  Sinus Pressure: No  Conjunctivitis:  No  Ear Pain: YES: bilateral  Rhinorrhea: No  Congestion: No  Sore Throat: YES  Cough: no  Wheeze: No  Decreased Appetite: No  Nausea: No  Vomiting: No  Diarrhea: YES  Dysuria/Freq.: YES- frequency   Dysuria or Hematuria: No  Fatigue/Achiness: YES  Sick/Strep Exposure: YES  Therapies tried and outcome: tylenol helped with the headache.  Day-quil/Nite-quil helped with  "the sore throat a little bit.         Review of Systems  Constitutional, HEENT, cardiovascular, pulmonary, gi and gu systems are negative, except as otherwise noted.      Objective    /69 (BP Location: Right arm, Patient Position: Sitting, Cuff Size: Adult Regular)   Pulse 97   Temp 98.3  F (36.8  C) (Oral)   Resp 16   Ht 1.702 m (5' 7\")   Wt 76.7 kg (169 lb 3.2 oz)   SpO2 96%   BMI 26.50 kg/m    Body mass index is 26.5 kg/m .  Physical Exam  Constitutional:       Appearance: Normal appearance.   HENT:      Head:      Comments: No b/l temporal tenderness, no TMJ tenderness     Right Ear: Tympanic membrane normal.      Left Ear: Tympanic membrane normal.      Mouth/Throat:      Mouth: Mucous membranes are moist.   Eyes:      Extraocular Movements: Extraocular movements intact.   Cardiovascular:      Rate and Rhythm: Normal rate and regular rhythm.   Pulmonary:      Effort: Pulmonary effort is normal.      Breath sounds: Normal breath sounds.   Abdominal:      Palpations: Abdomen is soft.   Musculoskeletal:         General: Normal range of motion.      Cervical back: Normal range of motion.   Skin:     General: Skin is warm.   Neurological:      General: No focal deficit present.      Mental Status: She is alert and oriented to person, place, and time. Mental status is at baseline.   Psychiatric:         Mood and Affect: Mood normal.         Behavior: Behavior normal.         Thought Content: Thought content normal.         Judgment: Judgment normal.                  Signed Electronically by: Maddi Solis MD    "

## 2025-02-25 ENCOUNTER — OFFICE VISIT (OUTPATIENT)
Dept: INTERNAL MEDICINE | Facility: CLINIC | Age: 35
End: 2025-02-25
Payer: COMMERCIAL

## 2025-02-25 VITALS
OXYGEN SATURATION: 96 % | DIASTOLIC BLOOD PRESSURE: 60 MMHG | HEART RATE: 86 BPM | TEMPERATURE: 97.2 F | WEIGHT: 168.7 LBS | BODY MASS INDEX: 26.48 KG/M2 | SYSTOLIC BLOOD PRESSURE: 102 MMHG | RESPIRATION RATE: 16 BRPM | HEIGHT: 67 IN

## 2025-02-25 DIAGNOSIS — R53.83 OTHER FATIGUE: ICD-10-CM

## 2025-02-25 DIAGNOSIS — L29.0 ANAL ITCHING: Primary | ICD-10-CM

## 2025-02-25 LAB
ERYTHROCYTE [DISTWIDTH] IN BLOOD BY AUTOMATED COUNT: 12.7 % (ref 10–15)
EST. AVERAGE GLUCOSE BLD GHB EST-MCNC: 114 MG/DL
HBA1C MFR BLD: 5.6 % (ref 0–5.6)
HCT VFR BLD AUTO: 42.1 % (ref 35–47)
HGB BLD-MCNC: 14.2 G/DL (ref 11.7–15.7)
MCH RBC QN AUTO: 28.7 PG (ref 26.5–33)
MCHC RBC AUTO-ENTMCNC: 33.7 G/DL (ref 31.5–36.5)
MCV RBC AUTO: 85 FL (ref 78–100)
PLATELET # BLD AUTO: 245 10E3/UL (ref 150–450)
RBC # BLD AUTO: 4.95 10E6/UL (ref 3.8–5.2)
WBC # BLD AUTO: 8.5 10E3/UL (ref 4–11)

## 2025-02-25 PROCEDURE — 80053 COMPREHEN METABOLIC PANEL: CPT | Performed by: INTERNAL MEDICINE

## 2025-02-25 PROCEDURE — 85027 COMPLETE CBC AUTOMATED: CPT | Performed by: INTERNAL MEDICINE

## 2025-02-25 PROCEDURE — 83550 IRON BINDING TEST: CPT | Performed by: INTERNAL MEDICINE

## 2025-02-25 PROCEDURE — 82728 ASSAY OF FERRITIN: CPT | Performed by: INTERNAL MEDICINE

## 2025-02-25 PROCEDURE — 3074F SYST BP LT 130 MM HG: CPT | Performed by: INTERNAL MEDICINE

## 2025-02-25 PROCEDURE — 3078F DIAST BP <80 MM HG: CPT | Performed by: INTERNAL MEDICINE

## 2025-02-25 PROCEDURE — 36415 COLL VENOUS BLD VENIPUNCTURE: CPT | Performed by: INTERNAL MEDICINE

## 2025-02-25 PROCEDURE — 84443 ASSAY THYROID STIM HORMONE: CPT | Performed by: INTERNAL MEDICINE

## 2025-02-25 PROCEDURE — 83036 HEMOGLOBIN GLYCOSYLATED A1C: CPT | Performed by: INTERNAL MEDICINE

## 2025-02-25 PROCEDURE — 99214 OFFICE O/P EST MOD 30 MIN: CPT | Performed by: INTERNAL MEDICINE

## 2025-02-25 PROCEDURE — 83540 ASSAY OF IRON: CPT | Performed by: INTERNAL MEDICINE

## 2025-02-25 PROCEDURE — 82306 VITAMIN D 25 HYDROXY: CPT | Performed by: INTERNAL MEDICINE

## 2025-02-25 ASSESSMENT — ENCOUNTER SYMPTOMS: FATIGUE: 1

## 2025-02-25 NOTE — PATIENT INSTRUCTIONS
1 % hydrocortisone cream applied twice daily for one to two weeks can relieve symptoms.      Labs today.

## 2025-02-25 NOTE — PROGRESS NOTES
"  Assessment & Plan     Anal itching  For 4 months, with no open sores, fissure, rash, difficulty with bowel movements, hemorrhoids, blood in the stool. She tried OTC skin moisturizers, Desitin, hydrocortisone once a day for 2 days.    On the exam, no visible sores, rash. Skin around the anus is intact and just slightly pink and darker. Anal tone normal on digital exam.    Home care info printed from [facility] and discussed.   1 % hydrocortisone cream applied twice daily for one to two weeks can relieve symptoms.      If not better in 2 weeks, she will see dermatologist.  - CBC with platelets; Future  - Comprehensive metabolic panel; Future  - TSH with free T4 reflex; Future  - Vitamin D Deficiency; Future  - Hemoglobin A1c; Future  - Iron & Iron Binding Capacity; Future  - Ferritin; Future  - CBC with platelets  - Comprehensive metabolic panel  - TSH with free T4 reflex  - Vitamin D Deficiency  - Hemoglobin A1c  - Iron & Iron Binding Capacity  - Ferritin    Other fatigue  For 3 weeks, after having good 8 h of sleep, is getting up tired and feels all the time like she did not sleep, or eat for a while. No other symptoms,   Labs will be done today.  Follow-up with PCP to discuss sleep study if all labs normal.  - CBC with platelets; Future  - Comprehensive metabolic panel; Future  - TSH with free T4 reflex; Future  - Vitamin D Deficiency; Future  - Hemoglobin A1c; Future  - Iron & Iron Binding Capacity; Future  - Ferritin; Future  - CBC with platelets  - Comprehensive metabolic panel  - TSH with free T4 reflex  - Vitamin D Deficiency  - Hemoglobin A1c  - Iron & Iron Binding Capacity  - Ferritin          BMI  Estimated body mass index is 26.42 kg/m  as calculated from the following:    Height as of this encounter: 1.702 m (5' 7\").    Weight as of this encounter: 76.5 kg (168 lb 11.2 oz).           Stephenie Reilly is a 34 year old, presenting for the following health issues:  Anal Itching (Itching Around Anus x4 Moth's No " "Rash, Getting Worse. ) and Fatigue (Fatigue/Very Low Energy Would Like Blood Work Check For Deficiencies.  )    Fatigue  Associated symptoms include fatigue.   History of Present Illness       Reason for visit:  Itching skin  Symptom onset:  More than a month  Symptoms include:  Strong itching in the anus area  Symptom intensity:  Moderate  Symptom progression:  Staying the same  Had these symptoms before:  No  What makes it worse:  No  What makes it better:  No   She is taking medications regularly.                     Objective    /60   Pulse 86   Temp 97.2  F (36.2  C) (Temporal)   Resp 16   Ht 1.702 m (5' 7\")   Wt 76.5 kg (168 lb 11.2 oz)   SpO2 96%   BMI 26.42 kg/m    Body mass index is 26.42 kg/m .  Physical Exam   Constitutional:  oriented to person, place, and time, appears well-nourished. No distress.   HENT:   Head: Normocephalic.   Mouth/Throat: Oropharynx is clear and moist.   Eyes: Conjunctivae are normal. Pupils are equal, round, and reactive to light.   Neck: Normal range of motion. Neck supple.   Cardiovascular: Normal rate, regular rhythm and normal heart sounds.    Pulmonary/Chest: Effort normal and breath sounds normal.   Abdominal: Soft. Bowel sounds are normal.   Musculoskeletal: Normal range of motion.   Neurological: alert and oriented to person, place, and time. Skin: Skin is warm.   Psychiatric: normal mood and affect.             Signed Electronically by: Jose Keita MD    "

## 2025-02-26 LAB
ALBUMIN SERPL BCG-MCNC: 4.4 G/DL (ref 3.5–5.2)
ALP SERPL-CCNC: 61 U/L (ref 40–150)
ALT SERPL W P-5'-P-CCNC: 20 U/L (ref 0–50)
ANION GAP SERPL CALCULATED.3IONS-SCNC: 10 MMOL/L (ref 7–15)
AST SERPL W P-5'-P-CCNC: 22 U/L (ref 0–45)
BILIRUB SERPL-MCNC: 0.4 MG/DL
BUN SERPL-MCNC: 16.3 MG/DL (ref 6–20)
CALCIUM SERPL-MCNC: 9.5 MG/DL (ref 8.8–10.4)
CHLORIDE SERPL-SCNC: 102 MMOL/L (ref 98–107)
CREAT SERPL-MCNC: 0.67 MG/DL (ref 0.51–0.95)
EGFRCR SERPLBLD CKD-EPI 2021: >90 ML/MIN/1.73M2
FERRITIN SERPL-MCNC: 61 NG/ML (ref 6–175)
GLUCOSE SERPL-MCNC: 104 MG/DL (ref 70–99)
HCO3 SERPL-SCNC: 27 MMOL/L (ref 22–29)
IRON BINDING CAPACITY (ROCHE): 369 UG/DL (ref 240–430)
IRON SATN MFR SERPL: 12 % (ref 15–46)
IRON SERPL-MCNC: 46 UG/DL (ref 37–145)
POTASSIUM SERPL-SCNC: 4 MMOL/L (ref 3.4–5.3)
PROT SERPL-MCNC: 6.9 G/DL (ref 6.4–8.3)
SODIUM SERPL-SCNC: 139 MMOL/L (ref 135–145)
TSH SERPL DL<=0.005 MIU/L-ACNC: 0.72 UIU/ML (ref 0.3–4.2)
VIT D+METAB SERPL-MCNC: 10 NG/ML (ref 20–50)

## 2025-05-18 ENCOUNTER — HEALTH MAINTENANCE LETTER (OUTPATIENT)
Age: 35
End: 2025-05-18